# Patient Record
Sex: MALE | Race: WHITE | NOT HISPANIC OR LATINO | Employment: FULL TIME | URBAN - METROPOLITAN AREA
[De-identification: names, ages, dates, MRNs, and addresses within clinical notes are randomized per-mention and may not be internally consistent; named-entity substitution may affect disease eponyms.]

---

## 2017-02-16 ENCOUNTER — TELEPHONE (OUTPATIENT)
Dept: MEDICAL GROUP | Age: 47
End: 2017-02-16

## 2017-02-16 ENCOUNTER — HOSPITAL ENCOUNTER (OUTPATIENT)
Dept: RADIOLOGY | Facility: MEDICAL CENTER | Age: 47
End: 2017-02-16
Attending: FAMILY MEDICINE
Payer: COMMERCIAL

## 2017-02-16 ENCOUNTER — OFFICE VISIT (OUTPATIENT)
Dept: MEDICAL GROUP | Age: 47
End: 2017-02-16
Payer: COMMERCIAL

## 2017-02-16 ENCOUNTER — APPOINTMENT (OUTPATIENT)
Dept: MEDICAL GROUP | Age: 47
End: 2017-02-16
Payer: COMMERCIAL

## 2017-02-16 VITALS
WEIGHT: 290.6 LBS | DIASTOLIC BLOOD PRESSURE: 96 MMHG | HEIGHT: 70 IN | SYSTOLIC BLOOD PRESSURE: 148 MMHG | TEMPERATURE: 97.7 F | OXYGEN SATURATION: 93 % | HEART RATE: 116 BPM | BODY MASS INDEX: 41.6 KG/M2

## 2017-02-16 DIAGNOSIS — J41.8 MIXED SIMPLE AND MUCOPURULENT CHRONIC BRONCHITIS (HCC): ICD-10-CM

## 2017-02-16 DIAGNOSIS — J20.8 VIRAL BRONCHITIS: ICD-10-CM

## 2017-02-16 DIAGNOSIS — J06.9 VIRAL UPPER RESPIRATORY TRACT INFECTION: ICD-10-CM

## 2017-02-16 DIAGNOSIS — E11.69 TYPE 2 DIABETES MELLITUS WITH OTHER SPECIFIED COMPLICATION (HCC): Chronic | ICD-10-CM

## 2017-02-16 PROCEDURE — 94010 BREATHING CAPACITY TEST: CPT | Performed by: FAMILY MEDICINE

## 2017-02-16 PROCEDURE — 99215 OFFICE O/P EST HI 40 MIN: CPT | Mod: 25 | Performed by: FAMILY MEDICINE

## 2017-02-16 PROCEDURE — 71020 DX-CHEST-2 VIEWS: CPT

## 2017-02-16 RX ORDER — PREDNISONE 20 MG/1
TABLET ORAL
Qty: 22 TAB | Refills: 0 | Status: SHIPPED | OUTPATIENT
Start: 2017-02-16 | End: 2017-02-28

## 2017-02-16 RX ORDER — IBUPROFEN 600 MG/1
600 TABLET ORAL EVERY 6 HOURS PRN
Qty: 30 TAB | Refills: 0 | Status: SHIPPED | OUTPATIENT
Start: 2017-02-16 | End: 2017-02-28

## 2017-02-16 RX ORDER — DOXYCYCLINE HYCLATE 100 MG
100 TABLET ORAL 2 TIMES DAILY
Qty: 20 TAB | Refills: 0 | Status: ON HOLD | OUTPATIENT
Start: 2017-02-16 | End: 2017-03-02

## 2017-02-16 RX ORDER — ALBUTEROL SULFATE 90 UG/1
2 AEROSOL, METERED RESPIRATORY (INHALATION) EVERY 6 HOURS PRN
Qty: 8.5 G | Refills: 0 | Status: SHIPPED | OUTPATIENT
Start: 2017-02-16 | End: 2017-02-28

## 2017-02-16 RX ORDER — LANCETS 30 GAUGE
EACH MISCELLANEOUS
Qty: 100 EACH | Refills: 3 | Status: SHIPPED | OUTPATIENT
Start: 2017-02-16 | End: 2017-02-28

## 2017-02-16 NOTE — MR AVS SNAPSHOT
"        Arnulfo Cotton   2017 3:00 PM   Office Visit   MRN: [id number]    Department:  [address] [place] [address]   Dept Phone:  [phone]    Description:  Male : 1970   Provider:  Elena Holcomb M.D.           Reason for Visit     Diabetes med refill metformin and glucose monitor    Cough x 2 mo    Generalized Body Aches x 2 mo      Allergies as of 2017     No Known Allergies      You were diagnosed with     Viral upper respiratory tract infection   [290568]       Viral bronchitis   [359117]       Elevated BP   [117060]         Vital Signs     Blood Pressure Pulse Temperature Height Weight Body Mass Index    148/96 mmHg 116 36.5 °C (97.7 °F) 1.778 m (5' 10\") 131.815 kg (290 lb 9.6 oz) 41.70 kg/m2    Oxygen Saturation Smoking Status                93% Never Smoker           Basic Information     Date Of Birth Sex Race Ethnicity Preferred Language    1970 Male Unknown Non- English      Problem List              ICD-10-CM Priority Class Noted - Resolved    Dyslipidemia (Chronic) E78.5   2015 - Present    Type 2 diabetes mellitus with other specified complication (CMS-HCC) (Chronic) E11.69   2015 - Present    Morbid obesity with BMI of 40.0-44.9, adult (CMS-HCC) E66.01, Z68.41   2015 - Present    Vitamin D deficiency E55.9   10/14/2015 - Present    Hyperglycemia without ketosis R73.9   2016 - Present    Chest pain R07.9   2016 - Present    Hypertension associated with diabetes (CMS-HCC) E11.59, I10   2016 - Present    Viral upper respiratory tract infection J06.9, B97.89   2017 - Present    Viral bronchitis J20.8   2017 - Present    Elevated BP I10   2017 - Present      Health Maintenance        Date Due Completion Dates    IMM HEP B VACCINE (1 of 3 - Primary Series) 1970 ---    RETINAL SCREENING 10/18/2015 10/18/2014    FASTING LIPID PROFILE 2016    URINE ACR / MICROALBUMIN 2016    DIABETES MONOFILAMENT / LE " EXAM 6/14/2016 6/14/2015    IMM INFLUENZA (1) 9/1/2016 10/14/2015    A1C SCREENING 3/5/2017 9/5/2016, 10/14/2015, 7/2/2015, 6/13/2015, 6/11/2015, 4/7/2014    SERUM CREATININE 9/5/2017 9/5/2016, 6/13/2015, 4/6/2014, 10/10/2013    IMM DTaP/Tdap/Td Vaccine (2 - Td) 10/6/2025 10/6/2015            Current Immunizations     Influenza Vaccine Quad Inj (Preserved) 10/14/2015  2:45 PM    Pneumococcal polysaccharide vaccine (PPSV-23) 10/14/2015  2:45 PM    Tdap Vaccine 10/6/2015    Tuberculin Skin Test 8/17/2015 10:25 AM      Below and/or attached are the medications your provider expects you to take. Review all of your home medications and newly ordered medications with your provider and/or pharmacist. Follow medication instructions as directed by your provider and/or pharmacist. Please keep your medication list with you and share with your provider. Update the information when medications are discontinued, doses are changed, or new medications (including over-the-counter products) are added; and carry medication information at all times in the event of emergency situations     Allergies:  No Known Allergies          Medications  Valid as of: February 16, 2017 -  3:56 PM    Generic Name Brand Name Tablet Size Instructions for use    Albuterol Sulfate (Aero Soln) albuterol 108 (90 BASE) MCG/ACT Inhale 2 Puffs by mouth every 6 hours as needed for Shortness of Breath.        Aspirin (Tablet Delayed Response) aspirin 81 MG Take 1 Tab by mouth every day.        Doxycycline Hyclate (Tab) VIBRAMYCIN 100 MG Take 1 Tab by mouth 2 times a day.        GlipiZIDE (Tab) GLUCOTROL 5 MG Take 1 Tab by mouth 2 times daily, before breakfast and dinner.        Glucose Blood (Strip) BLOOD GLUCOSE TEST STRIPS  To be used with glucometer to test blood glucose at least three times daily        Ibuprofen (Tab) MOTRIN 600 MG Take 1 Tab by mouth every 6 hours as needed.        Lancets (Misc) Lancets  Use with glucometer to test blood sugar at least  three times daily        Lisinopril (Tab) PRINIVIL 10 MG Take 1 Tab by mouth every day.        MetFORMIN HCl (Tab) GLUCOPHAGE 500 MG Take 1 Tab by mouth 2 times a day, with meals.        Multiple Vitamins-Minerals (Tab) THERAGRAN-M  Take 1 Tab by mouth every day.        Phenyleph-Promethazine-Cod (Syrup) PHENERGAN VC CODEINE 5-6.25-10 MG/5ML Take 5 mL by mouth every 8 hours as needed.        PredniSONE (Tab) DELTASONE 20 MG Take 3 tabs po for 2 days then 2 tabs for 2 days then 1 for 2 days        .                 Medicines prescribed today were sent to:     [facility] #[id number] - [place], [place] - [address]    [address] [facility] [place] [address]    Phone: [phone] Fax: [phone]    Open 24 Hours?: No    [facility] #[id number] - [facility] [address] [facility] [address]    Phone: [phone] Fax: [phone]    Open 24 Hours?: No      Medication refill instructions:       If your prescription bottle indicates you have medication refills left, it is not necessary to call your provider’s office. Please contact your pharmacy and they will refill your medication.    If your prescription bottle indicates you do not have any refills left, you may request refills at any time through one of the following ways: The online [employer] system (except Urgent Care), by calling your provider’s office, or by asking your pharmacy to contact your provider’s office with a refill request. Medication refills are processed only during regular business hours and may not be available until the next business day. Your provider may request additional information or to have a follow-up visit with you prior to refilling your medication.   *Please Note: Medication refills are assigned a new Rx number when refilled electronically. Your pharmacy may indicate that no refills were authorized even though a new prescription for the same medication is available at the pharmacy. Please request  the medicine by name with the pharmacy before contacting your provider for a refill.        Your To Do List     Future Labs/Procedures Complete By Expires    DX-CHEST-2 VIEWS  As directed 8/19/2017         [employer] Access Code: [email or web address]  Expires: 3/11/2017  2:32 PM    [employer]  A secure, online tool to manage your health information     [employer]’s [employer]® is a secure, online tool that connects you to your personalized health information from the privacy of your home -- day or night - making it very easy for you to manage your healthcare. Once the activation process is completed, you can even access your medical information using the [employer] james, which is available for free in the Apple James store or Google Play store.     [employer] provides the following levels of access (as shown below):   My Chart Features   [facility] Doctor [facility]  Urgent  Care [facility]  Primary Care  Doctor   Email your healthcare team securely and privately 24/7 X X X    Manage appointments: schedule your next appointment; view details of past/upcoming appointments X      Request prescription refills. X      View recent personal medical records, including lab and immunizations X X X X   View health record, including health history, allergies, medications X X X X   Read reports about your outpatient visits, procedures, consult and ER notes X X X X   See your discharge summary, which is a recap of your hospital and/or ER visit that includes your diagnosis, lab results, and care plan. X X       How to register for [employer]:  1. Go to  [email or web address].  2. Click on the Sign Up Now box, which takes you to the New Member Sign Up page. You will need to provide the following information:  a. Enter your [employer] Access Code exactly as it appears at the top of this page. (You will not need to use this code after you’ve completed the sign-up process. If you do not sign up before the expiration date, you  must request a new code.)   b. Enter your date of birth.   c. Enter your home email address.   d. Click Submit, and follow the next screen’s instructions.  3. Create a [employer] ID. This will be your [employer] login ID and cannot be changed, so think of one that is secure and easy to remember.  4. Create a [employer] password. You can change your password at any time.  5. Enter your Password Reset Question and Answer. This can be used at a later time if you forget your password.   6. Enter your e-mail address. This allows you to receive e-mail notifications when new information is available in [employer].  7. Click Sign Up. You can now view your health information.    For assistance activating your [employer] account, call [phone]

## 2017-02-16 NOTE — Clinical Note
[employer]  Nelida Bearden PA-C  [address]  [place] [address]  Fax: [phone]   Authorization for Release/Disclosure of   Protected Health Information   Name: ARNULFO COTTON : 1970 SSN: [id number]   Address: [address]  [place] [address] Phone:    [phone] (home)    I authorize the entity listed below to release/disclose the PHI below to:   [employer]/Nelida Bearden PA-C and Elena Holcomb M.D.   Provider or Entity Name:  Chauncey [facility]or   Address   City, State, [facility]   Phone:      Fax:     Reason for request: continuity of care   Information to be released:    [  ] LAST COLONOSCOPY,  including any PATH REPORT and follow-up  [  ] LAST FIT/COLOGUARD RESULT [  ] LAST DEXA  [  ] LAST MAMMOGRAM  [  ] LAST PAP  [  ] LAST LABS [ xxx ] RETINA EXAM REPORT  [  ] IMMUNIZATION RECORDS  [] Release all info      [  ] Check here and initial the line next to each item to release ALL health information INCLUDING  _____ Care and treatment for drug and / or alcohol abuse  _____ HIV testing, infection status, or AIDS  _____ Genetic Testing    DATES OF SERVICE OR TIME PERIOD TO BE DISCLOSED: _____________  I understand and acknowledge that:  * This Authorization may be revoked at any time by you in writing, except if your health information has already been used or disclosed.  * Your health information that will be used or disclosed as a result of you signing this authorization could be re-disclosed by the recipient. If this occurs, your re-disclosed health information may no longer be protected by State or Federal laws.  * You may refuse to sign this Authorization. Your refusal will not affect your ability to obtain treatment.  * This Authorization becomes effective upon signing and will  on (date) __________.      If no date is indicated, this Authorization will  one (1) year from the signature date.    Name: Arnulfo Cotton    Signature:   Date:     2017       PLEASE FAX  REQUESTED RECORDS BACK TO: [phone]

## 2017-02-17 NOTE — PROGRESS NOTES
This medical record contains text that has been entered with the assistance of computer voice recognition and dictation software.  Therefore, it may contain unintended errors in text, spelling, punctuation, or grammar    Chief Complaint   Patient presents with   • Diabetes     med refill metformin and glucose monitor   • Cough     x 2 mo   • Generalized Body Aches     x 2 mo       Arnulfo Cotton is a 46 y.o. male here evaluation and management of: Productive cough ×6 weeks, shortness of breath      HPI:     Viral bronchitis  The patient is a 46-year-old male who presents to clinic with a chief complaint of coughing up green sputum for the past 6 weeks. He does not smoke cigarettes, hour he is often exposed to secondhand smoke in the casinos as well as from his brother in law and his sister. He states that often his face looks pale he has a difficult time getting air out of his lungs. People often say that he looks like a ghost, very pale. He has no history of chronic ulcerative pulmonary disease.    Elevated BP  Patient has had elevated blood pressure in the past 2 office visits, however he states he was very anxious and today he is very sick. Denies any chest pain, he does have chronic shortness of breath, chronic cough which is productive, as well as dyspnea on exertion which he's had for many years.    Current medicines (including changes today)  Current Outpatient Prescriptions   Medication Sig Dispense Refill   • albuterol 108 (90 BASE) MCG/ACT Aero Soln inhalation aerosol Inhale 2 Puffs by mouth every 6 hours as needed for Shortness of Breath. 8.5 g 0   • prometh-phenylephrine-codeine (PHENERGAN VC CODEINE) 5-6.25-10 MG/5ML Syrup Take 5 mL by mouth every 8 hours as needed. 280 mL 0   • doxycycline (VIBRAMYCIN) 100 MG Tab Take 1 Tab by mouth 2 times a day. 20 Tab 0   • ibuprofen (MOTRIN) 600 MG Tab Take 1 Tab by mouth every 6 hours as needed. 30 Tab 0   • predniSONE (DELTASONE) 20 MG Tab Take 3 tabs po for 2  days then 2 tabs for 2 days then 1 for 2 days 22 Tab 0   • fluticasone-salmeterol (ADVAIR) 100-50 MCG/DOSE AEROSOL POWDER, BREATH ACTIVATED Inhale 1 Puff by mouth every 12 hours. 1 Inhaler 0   • metformin (GLUCOPHAGE) 500 MG Tab Take 1 Tab by mouth 2 times a day, with meals. 60 Tab 3   • glipiZIDE (GLUCOTROL) 5 MG Tab Take 1 Tab by mouth 2 times daily, before breakfast and dinner. 60 Tab 3   • Glucose Blood (BLOOD GLUCOSE TEST STRIPS) Strip To be used with glucometer to test blood glucose at least three times daily 100 Strip 2   • Lancets Misc Use with glucometer to test blood sugar at least three times daily 100 Each 0   • therapeutic multivitamin-minerals (THERAGRAN-M) Tab Take 1 Tab by mouth every day.     • lisinopril (PRINIVIL) 10 MG Tab Take 1 Tab by mouth every day. (Patient not taking: Reported on 2017) 30 Tab 3   • aspirin EC 81 MG EC tablet Take 1 Tab by mouth every day. (Patient not taking: Reported on 2017) 30 Tab 0     No current facility-administered medications for this visit.     He  has a past medical history of Type II or unspecified type diabetes mellitus without mention of complication, not stated as uncontrolled.  He  has past surgical history that includes other orthopedic surgery.  Social History   Substance Use Topics   • Smoking status: Never Smoker    • Smokeless tobacco: Former User     Types: Chew     Quit date: 2010      Comment: 25 years   • Alcohol Use: No     Social History     Social History Narrative     Family History   Problem Relation Age of Onset   • Diabetes Mother    • Hypertension Mother    • Diabetes Father    • Cancer Paternal Uncle      lymphoma, skin   • Diabetes Maternal Grandmother    • Cancer Maternal Grandfather      stomach   • Diabetes Paternal Grandfather    • Heart Disease Neg Hx    • Stroke Neg Hx      Family Status   Relation Status Death Age   • Mother Alive    • Father     • Sister Alive    • Brother Alive    • Paternal Uncle    "  • Maternal Grandmother     • Maternal Grandfather     • Paternal Grandmother     • Paternal Grandfather           ROS  Please see history of present illness    All other systems reviewed and are negative     Objective:     Blood pressure 148/96, pulse 116, temperature 36.5 °C (97.7 °F), height 1.778 m (5' 10\"), weight 131.815 kg (290 lb 9.6 oz), SpO2 93 %. Body mass index is 41.7 kg/(m^2).  Physical Exam:    Constitutional: Alert, no distress.  Skin: Warm, dry, good turgor, no rashes in visible areas.  Eye: Equal, round and reactive, conjunctiva clear, lids normal.  ENMT: Lips without lesions, good dentition, oropharynx clear.  Neck: Trachea midline, no masses, no thyromegaly. No cervical or supraclavicular lymphadenopathy.  Respiratory: Unlabored respiratory effort, decreased breath sounds in left lower lobe, no wheezing, no rhales  Cardiovascular: Normal S1, S2, no murmur, no edema.  Abdomen: Soft, non-tender, no masses, no hepatosplenomegaly.  Psych: Alert and oriented x3, normal affect and mood.          Assessment and Plan:   The following treatment plan was discussed, again this medical record contains text that has been entered with the assistance of computer voice recognition and dictation software.  Therefore, it may contain unintended errors in text, spelling, punctuation, or grammar        1. Viral bronchitis  Patient has severe symptoms so we will start antibiotic  We will also obtain chest x-ray looking for infiltrate versus hyperinflation  There were diminished breath sounds in the left lower lobe  Upon chart review one year ago he had left lower lobe pneumonia  He will discuss with PCP to  consider CT scan of the chest looking for obstructing lesion.    - albuterol 108 (90 BASE) MCG/ACT Aero Soln inhalation aerosol; Inhale 2 Puffs by mouth every 6 hours as needed for Shortness of Breath.  Dispense: 8.5 g; Refill: 0  - prometh-phenylephrine-codeine (PHENERGAN VC " CODEINE) 5-6.25-10 MG/5ML Syrup; Take 5 mL by mouth every 8 hours as needed.  Dispense: 280 mL; Refill: 0  - doxycycline (VIBRAMYCIN) 100 MG Tab; Take 1 Tab by mouth 2 times a day.  Dispense: 20 Tab; Refill: 0  - ibuprofen (MOTRIN) 600 MG Tab; Take 1 Tab by mouth every 6 hours as needed.  Dispense: 30 Tab; Refill: 0  - DX-CHEST-2 VIEWS; Future  - PULMONARY FUNCTION TESTS Test requested: Complete Pulmonary Function Test  - predniSONE (DELTASONE) 20 MG Tab; Take 3 tabs po for 2 days then 2 tabs for 2 days then 1 for 2 days  Dispense: 22 Tab; Refill: 0  - OH BREATHING CAPACITY TEST    3. Elevated BP  Patient  was given instructions to make a two-week home BP log  Then provide this to our clinic  We will then consider appropriate treatment    4. Mixed simple and mucopurulent chronic bronchitis (CMS-HCC)  Spirometry in clinic revealed severely obstructive pattern  We will begin Advair as well as albuterol when necessary  He needs pulmonary function testing as well  He may need home O2 therapy    - fluticasone-salmeterol (ADVAIR) 100-50 MCG/DOSE AEROSOL POWDER, BREATH ACTIVATED; Inhale 1 Puff by mouth every 12 hours.  Dispense: 1 Inhaler; Refill: 0  - REFERRAL TO PULMONOLOGY        Followup: Return in about 4 weeks (around 3/16/2017), or if symptoms worsen or fail to improve, for Reevaluation.

## 2017-02-17 NOTE — TELEPHONE ENCOUNTER
----- Message from Elena Holcomb M.D. sent at 2/16/2017  6:01 PM PST -----  There was no pneumonia seen on your chest xray.  There was scarring seen in the area of pneumonia from last year.  We will consider doing a CT on this, discuss it with your PCP.      Herbert Holcomb MD  [employer], [address] [address]

## 2017-02-17 NOTE — TELEPHONE ENCOUNTER
Phone Number Called: [phone] (home)       Message: Left message for patient to call regarding results    Left Message for patient to call back: yes

## 2017-02-17 NOTE — ASSESSMENT & PLAN NOTE
The patient is a 46-year-old male who presents to clinic with a chief complaint of coughing up green sputum for the past 6 weeks. He does not smoke cigarettes, hour he is often exposed to secondhand smoke in the casinos as well as from his brother in law and his sister. He states that often his face looks pale he has a difficult time getting air out of his lungs. People often say that he looks like a ghost, very pale. He has no history of chronic ulcerative pulmonary disease.

## 2017-02-17 NOTE — ASSESSMENT & PLAN NOTE
Patient has had elevated blood pressure in the past 2 office visits, however he states he was very anxious and today he is very sick. Denies any chest pain, he does have chronic shortness of breath, chronic cough which is productive, as well as dyspnea on exertion which he's had for many years.

## 2017-02-21 DIAGNOSIS — E11.69 TYPE 2 DIABETES MELLITUS WITH OTHER SPECIFIED COMPLICATION (HCC): Chronic | ICD-10-CM

## 2017-02-21 NOTE — TELEPHONE ENCOUNTER
Pharmacy requesting rx for test strips Abbott Precision xtra test strips sig:  Use qd #100    Was the patient seen in the last year in this department? Yes     Does patient have an active prescription for medications requested? No     Received Request Via: Pharmacy

## 2017-02-22 RX ORDER — LANCETS 30 GAUGE
EACH MISCELLANEOUS
Qty: 100 EACH | Refills: 0 | Status: SHIPPED | OUTPATIENT
Start: 2017-02-22 | End: 2017-02-28

## 2017-02-22 NOTE — TELEPHONE ENCOUNTER
Refill done.    Herbert Holcomb MD  [employer]omat, [facility]  [address] [place] [id number]

## 2017-02-28 ENCOUNTER — APPOINTMENT (OUTPATIENT)
Dept: RADIOLOGY | Facility: MEDICAL CENTER | Age: 47
End: 2017-02-28
Attending: EMERGENCY MEDICINE
Payer: COMMERCIAL

## 2017-02-28 ENCOUNTER — HOSPITAL ENCOUNTER (OUTPATIENT)
Facility: MEDICAL CENTER | Age: 47
End: 2017-03-02
Attending: EMERGENCY MEDICINE | Admitting: INTERNAL MEDICINE
Payer: COMMERCIAL

## 2017-02-28 ENCOUNTER — HOSPITAL ENCOUNTER (OUTPATIENT)
Dept: OTHER | Facility: MEDICAL CENTER | Age: 47
End: 2017-02-28
Attending: FAMILY MEDICINE
Payer: COMMERCIAL

## 2017-02-28 ENCOUNTER — RESOLUTE PROFESSIONAL BILLING HOSPITAL PROF FEE (OUTPATIENT)
Dept: HOSPITALIST | Facility: MEDICAL CENTER | Age: 47
End: 2017-02-28
Payer: COMMERCIAL

## 2017-02-28 DIAGNOSIS — J20.8 VIRAL BRONCHITIS: ICD-10-CM

## 2017-02-28 DIAGNOSIS — R07.9 CHEST PAIN, UNSPECIFIED TYPE: ICD-10-CM

## 2017-02-28 DIAGNOSIS — R73.9 HYPERGLYCEMIA: ICD-10-CM

## 2017-02-28 LAB
ALBUMIN SERPL BCP-MCNC: 4.4 G/DL (ref 3.2–4.9)
ALBUMIN/GLOB SERPL: 1.7 G/DL
ALP SERPL-CCNC: 82 U/L (ref 30–99)
ALT SERPL-CCNC: 32 U/L (ref 2–50)
ANION GAP SERPL CALC-SCNC: 9 MMOL/L (ref 0–11.9)
AST SERPL-CCNC: 20 U/L (ref 12–45)
BASOPHILS # BLD AUTO: 0.7 % (ref 0–1.8)
BASOPHILS # BLD: 0.04 K/UL (ref 0–0.12)
BILIRUB SERPL-MCNC: 0.6 MG/DL (ref 0.1–1.5)
BNP SERPL-MCNC: 16 PG/ML (ref 0–100)
BUN SERPL-MCNC: 13 MG/DL (ref 8–22)
CALCIUM SERPL-MCNC: 9.8 MG/DL (ref 8.5–10.5)
CHLORIDE SERPL-SCNC: 106 MMOL/L (ref 96–112)
CO2 SERPL-SCNC: 24 MMOL/L (ref 20–33)
CREAT SERPL-MCNC: 0.73 MG/DL (ref 0.5–1.4)
DEPRECATED D DIMER PPP IA-ACNC: 568 NG/ML(D-DU)
EKG IMPRESSION: NORMAL
EOSINOPHIL # BLD AUTO: 0.1 K/UL (ref 0–0.51)
EOSINOPHIL NFR BLD: 1.8 % (ref 0–6.9)
ERYTHROCYTE [DISTWIDTH] IN BLOOD BY AUTOMATED COUNT: 40.9 FL (ref 35.9–50)
EST. AVERAGE GLUCOSE BLD GHB EST-MCNC: 263 MG/DL
GFR SERPL CREATININE-BSD FRML MDRD: >60 ML/MIN/1.73 M 2
GLOBULIN SER CALC-MCNC: 2.6 G/DL (ref 1.9–3.5)
GLUCOSE BLD-MCNC: 152 MG/DL (ref 65–99)
GLUCOSE BLD-MCNC: 217 MG/DL (ref 65–99)
GLUCOSE SERPL-MCNC: 237 MG/DL (ref 65–99)
HBA1C MFR BLD: 10.8 % (ref 0–5.6)
HCT VFR BLD AUTO: 49.5 % (ref 42–52)
HGB BLD-MCNC: 17.1 G/DL (ref 14–18)
IMM GRANULOCYTES # BLD AUTO: 0.01 K/UL (ref 0–0.11)
IMM GRANULOCYTES NFR BLD AUTO: 0.2 % (ref 0–0.9)
INR PPP: 0.98 (ref 0.87–1.13)
LIPASE SERPL-CCNC: 8 U/L (ref 11–82)
LV EJECT FRACT  99904: 65
LV EJECT FRACT MOD 2C 99903: 68.79
LV EJECT FRACT MOD 4C 99902: 62.83
LV EJECT FRACT MOD BP 99901: 66.14
LYMPHOCYTES # BLD AUTO: 1.68 K/UL (ref 1–4.8)
LYMPHOCYTES NFR BLD: 29.7 % (ref 22–41)
MCH RBC QN AUTO: 29.4 PG (ref 27–33)
MCHC RBC AUTO-ENTMCNC: 34.5 G/DL (ref 33.7–35.3)
MCV RBC AUTO: 85.1 FL (ref 81.4–97.8)
MONOCYTES # BLD AUTO: 0.33 K/UL (ref 0–0.85)
MONOCYTES NFR BLD AUTO: 5.8 % (ref 0–13.4)
NEUTROPHILS # BLD AUTO: 3.5 K/UL (ref 1.82–7.42)
NEUTROPHILS NFR BLD: 61.8 % (ref 44–72)
NRBC # BLD AUTO: 0 K/UL
NRBC BLD AUTO-RTO: 0 /100 WBC
PLATELET # BLD AUTO: 258 K/UL (ref 164–446)
PMV BLD AUTO: 9.8 FL (ref 9–12.9)
POTASSIUM SERPL-SCNC: 3.8 MMOL/L (ref 3.6–5.5)
PROT SERPL-MCNC: 7 G/DL (ref 6–8.2)
PROTHROMBIN TIME: 13.3 SEC (ref 12–14.6)
RBC # BLD AUTO: 5.82 M/UL (ref 4.7–6.1)
SODIUM SERPL-SCNC: 139 MMOL/L (ref 135–145)
TROPONIN I SERPL-MCNC: <0.01 NG/ML (ref 0–0.04)
WBC # BLD AUTO: 5.7 K/UL (ref 4.8–10.8)

## 2017-02-28 PROCEDURE — 700111 HCHG RX REV CODE 636 W/ 250 OVERRIDE (IP): Performed by: INTERNAL MEDICINE

## 2017-02-28 PROCEDURE — G0378 HOSPITAL OBSERVATION PER HR: HCPCS

## 2017-02-28 PROCEDURE — 99220 PR INITIAL OBSERVATION CARE,LEVL III: CPT | Performed by: INTERNAL MEDICINE

## 2017-02-28 PROCEDURE — 700102 HCHG RX REV CODE 250 W/ 637 OVERRIDE(OP): Performed by: INTERNAL MEDICINE

## 2017-02-28 PROCEDURE — 82962 GLUCOSE BLOOD TEST: CPT | Mod: 91

## 2017-02-28 PROCEDURE — 93306 TTE W/DOPPLER COMPLETE: CPT | Mod: 26 | Performed by: INTERNAL MEDICINE

## 2017-02-28 PROCEDURE — 93010 ELECTROCARDIOGRAM REPORT: CPT | Performed by: INTERNAL MEDICINE

## 2017-02-28 PROCEDURE — 700117 HCHG RX CONTRAST REV CODE 255: Performed by: EMERGENCY MEDICINE

## 2017-02-28 PROCEDURE — A9270 NON-COVERED ITEM OR SERVICE: HCPCS | Performed by: EMERGENCY MEDICINE

## 2017-02-28 PROCEDURE — 700102 HCHG RX REV CODE 250 W/ 637 OVERRIDE(OP): Performed by: EMERGENCY MEDICINE

## 2017-02-28 PROCEDURE — 94760 N-INVAS EAR/PLS OXIMETRY 1: CPT

## 2017-02-28 PROCEDURE — 94726 PLETHYSMOGRAPHY LUNG VOLUMES: CPT | Mod: 26 | Performed by: INTERNAL MEDICINE

## 2017-02-28 PROCEDURE — 83880 ASSAY OF NATRIURETIC PEPTIDE: CPT

## 2017-02-28 PROCEDURE — A9270 NON-COVERED ITEM OR SERVICE: HCPCS | Performed by: INTERNAL MEDICINE

## 2017-02-28 PROCEDURE — 93005 ELECTROCARDIOGRAM TRACING: CPT | Performed by: EMERGENCY MEDICINE

## 2017-02-28 PROCEDURE — 85379 FIBRIN DEGRADATION QUANT: CPT

## 2017-02-28 PROCEDURE — 83690 ASSAY OF LIPASE: CPT

## 2017-02-28 PROCEDURE — 93005 ELECTROCARDIOGRAM TRACING: CPT

## 2017-02-28 PROCEDURE — 99285 EMERGENCY DEPT VISIT HI MDM: CPT

## 2017-02-28 PROCEDURE — 93005 ELECTROCARDIOGRAM TRACING: CPT | Performed by: INTERNAL MEDICINE

## 2017-02-28 PROCEDURE — 71275 CT ANGIOGRAPHY CHEST: CPT

## 2017-02-28 PROCEDURE — 94729 DIFFUSING CAPACITY: CPT

## 2017-02-28 PROCEDURE — A9270 NON-COVERED ITEM OR SERVICE: HCPCS | Performed by: HOSPITALIST

## 2017-02-28 PROCEDURE — 700102 HCHG RX REV CODE 250 W/ 637 OVERRIDE(OP): Performed by: HOSPITALIST

## 2017-02-28 PROCEDURE — 94729 DIFFUSING CAPACITY: CPT | Mod: 26 | Performed by: INTERNAL MEDICINE

## 2017-02-28 PROCEDURE — 84484 ASSAY OF TROPONIN QUANT: CPT | Mod: 91

## 2017-02-28 PROCEDURE — 85025 COMPLETE CBC W/AUTO DIFF WBC: CPT

## 2017-02-28 PROCEDURE — 71010 DX-CHEST-PORTABLE (1 VIEW): CPT

## 2017-02-28 PROCEDURE — 85610 PROTHROMBIN TIME: CPT

## 2017-02-28 PROCEDURE — 80053 COMPREHEN METABOLIC PANEL: CPT

## 2017-02-28 PROCEDURE — 93306 TTE W/DOPPLER COMPLETE: CPT

## 2017-02-28 PROCEDURE — 83036 HEMOGLOBIN GLYCOSYLATED A1C: CPT

## 2017-02-28 PROCEDURE — 94060 EVALUATION OF WHEEZING: CPT | Mod: 26 | Performed by: INTERNAL MEDICINE

## 2017-02-28 PROCEDURE — 94060 EVALUATION OF WHEEZING: CPT

## 2017-02-28 PROCEDURE — 36415 COLL VENOUS BLD VENIPUNCTURE: CPT

## 2017-02-28 PROCEDURE — 94726 PLETHYSMOGRAPHY LUNG VOLUMES: CPT

## 2017-02-28 RX ORDER — SODIUM CHLORIDE 9 MG/ML
INJECTION, SOLUTION INTRAVENOUS CONTINUOUS
Status: DISCONTINUED | OUTPATIENT
Start: 2017-03-01 | End: 2017-03-02 | Stop reason: HOSPADM

## 2017-02-28 RX ORDER — NITROGLYCERIN 0.4 MG/1
0.4 TABLET SUBLINGUAL
Status: DISCONTINUED | OUTPATIENT
Start: 2017-02-28 | End: 2017-02-28

## 2017-02-28 RX ORDER — ASPIRIN 325 MG
325 TABLET ORAL DAILY
Status: DISCONTINUED | OUTPATIENT
Start: 2017-03-01 | End: 2017-03-02

## 2017-02-28 RX ORDER — IBUPROFEN 600 MG/1
600 TABLET ORAL 3 TIMES DAILY PRN
Status: DISCONTINUED | OUTPATIENT
Start: 2017-02-28 | End: 2017-03-02 | Stop reason: HOSPADM

## 2017-02-28 RX ORDER — NITROGLYCERIN 0.4 MG/1
0.4 TABLET SUBLINGUAL
Status: DISCONTINUED | OUTPATIENT
Start: 2017-02-28 | End: 2017-03-02 | Stop reason: HOSPADM

## 2017-02-28 RX ORDER — DOCUSATE SODIUM 100 MG/1
100 CAPSULE, LIQUID FILLED ORAL 2 TIMES DAILY
Status: DISCONTINUED | OUTPATIENT
Start: 2017-02-28 | End: 2017-03-02 | Stop reason: HOSPADM

## 2017-02-28 RX ORDER — BISACODYL 10 MG
10 SUPPOSITORY, RECTAL RECTAL
Status: DISCONTINUED | OUTPATIENT
Start: 2017-02-28 | End: 2017-03-02 | Stop reason: HOSPADM

## 2017-02-28 RX ORDER — ASPIRIN 81 MG/1
324 TABLET, CHEWABLE ORAL ONCE
Status: COMPLETED | OUTPATIENT
Start: 2017-02-28 | End: 2017-02-28

## 2017-02-28 RX ORDER — LISINOPRIL 10 MG/1
10 TABLET ORAL DAILY
Status: DISCONTINUED | OUTPATIENT
Start: 2017-03-01 | End: 2017-03-02

## 2017-02-28 RX ORDER — ONDANSETRON 2 MG/ML
4 INJECTION INTRAMUSCULAR; INTRAVENOUS EVERY 4 HOURS PRN
Status: DISCONTINUED | OUTPATIENT
Start: 2017-02-28 | End: 2017-03-02 | Stop reason: HOSPADM

## 2017-02-28 RX ORDER — ASPIRIN 300 MG/1
300 SUPPOSITORY RECTAL DAILY
Status: DISCONTINUED | OUTPATIENT
Start: 2017-03-01 | End: 2017-03-02

## 2017-02-28 RX ORDER — LACTULOSE 20 G/30ML
30 SOLUTION ORAL
Status: DISCONTINUED | OUTPATIENT
Start: 2017-02-28 | End: 2017-03-02 | Stop reason: HOSPADM

## 2017-02-28 RX ORDER — PROMETHAZINE HYDROCHLORIDE 25 MG/1
12.5-25 SUPPOSITORY RECTAL EVERY 4 HOURS PRN
Status: DISCONTINUED | OUTPATIENT
Start: 2017-02-28 | End: 2017-03-02 | Stop reason: HOSPADM

## 2017-02-28 RX ORDER — ASPIRIN 81 MG/1
324 TABLET, CHEWABLE ORAL DAILY
Status: DISCONTINUED | OUTPATIENT
Start: 2017-03-01 | End: 2017-03-02

## 2017-02-28 RX ORDER — ATORVASTATIN CALCIUM 20 MG/1
20 TABLET, FILM COATED ORAL
Status: DISCONTINUED | OUTPATIENT
Start: 2017-02-28 | End: 2017-03-02 | Stop reason: HOSPADM

## 2017-02-28 RX ORDER — PROMETHAZINE HYDROCHLORIDE 25 MG/1
12.5-25 TABLET ORAL EVERY 4 HOURS PRN
Status: DISCONTINUED | OUTPATIENT
Start: 2017-02-28 | End: 2017-03-02 | Stop reason: HOSPADM

## 2017-02-28 RX ORDER — SODIUM CHLORIDE 9 MG/ML
INJECTION, SOLUTION INTRAVENOUS CONTINUOUS
Status: DISCONTINUED | OUTPATIENT
Start: 2017-02-28 | End: 2017-02-28

## 2017-02-28 RX ORDER — ONDANSETRON 4 MG/1
4 TABLET, ORALLY DISINTEGRATING ORAL EVERY 4 HOURS PRN
Status: DISCONTINUED | OUTPATIENT
Start: 2017-02-28 | End: 2017-03-02 | Stop reason: HOSPADM

## 2017-02-28 RX ORDER — ENEMA 19; 7 G/133ML; G/133ML
1 ENEMA RECTAL
Status: DISCONTINUED | OUTPATIENT
Start: 2017-02-28 | End: 2017-03-02 | Stop reason: HOSPADM

## 2017-02-28 RX ORDER — AMOXICILLIN 250 MG
1 CAPSULE ORAL
Status: DISCONTINUED | OUTPATIENT
Start: 2017-02-28 | End: 2017-03-02 | Stop reason: HOSPADM

## 2017-02-28 RX ORDER — ACETAMINOPHEN 325 MG/1
650 TABLET ORAL EVERY 4 HOURS PRN
Status: DISCONTINUED | OUTPATIENT
Start: 2017-02-28 | End: 2017-03-02 | Stop reason: HOSPADM

## 2017-02-28 RX ORDER — DEXTROSE MONOHYDRATE 25 G/50ML
25 INJECTION, SOLUTION INTRAVENOUS
Status: DISCONTINUED | OUTPATIENT
Start: 2017-02-28 | End: 2017-03-02 | Stop reason: HOSPADM

## 2017-02-28 RX ORDER — AMOXICILLIN 250 MG
1 CAPSULE ORAL NIGHTLY
Status: DISCONTINUED | OUTPATIENT
Start: 2017-02-28 | End: 2017-03-02 | Stop reason: HOSPADM

## 2017-02-28 RX ADMIN — METOPROLOL TARTRATE 25 MG: 25 TABLET, FILM COATED ORAL at 16:28

## 2017-02-28 RX ADMIN — NITROGLYCERIN 1 INCH: 20 OINTMENT TOPICAL at 16:29

## 2017-02-28 RX ADMIN — ACETAMINOPHEN 650 MG: 325 TABLET ORAL at 21:53

## 2017-02-28 RX ADMIN — NITROGLYCERIN 1 INCH: 20 OINTMENT TOPICAL at 21:26

## 2017-02-28 RX ADMIN — METOPROLOL TARTRATE 25 MG: 25 TABLET, FILM COATED ORAL at 21:25

## 2017-02-28 RX ADMIN — ATORVASTATIN CALCIUM 20 MG: 20 TABLET, FILM COATED ORAL at 21:25

## 2017-02-28 RX ADMIN — INSULIN LISPRO 3 UNITS: 100 INJECTION, SOLUTION INTRAVENOUS; SUBCUTANEOUS at 21:32

## 2017-02-28 RX ADMIN — NITROGLYCERIN 0.4 MG: 0.4 TABLET SUBLINGUAL at 13:50

## 2017-02-28 RX ADMIN — ENOXAPARIN SODIUM 120 MG: 150 INJECTION SUBCUTANEOUS at 16:28

## 2017-02-28 RX ADMIN — ENOXAPARIN SODIUM 120 MG: 150 INJECTION SUBCUTANEOUS at 21:26

## 2017-02-28 RX ADMIN — ASPIRIN 324 MG: 81 TABLET, CHEWABLE ORAL at 09:38

## 2017-02-28 RX ADMIN — IOHEXOL 75 ML: 350 INJECTION, SOLUTION INTRAVENOUS at 10:56

## 2017-02-28 ASSESSMENT — PULMONARY FUNCTION TESTS
FEV1/FVC: 84
FEV1/FVC: 87.74
FEV1_PREDICTED: 4.14
FVC: 4.16
FEV1/FVC_PERCENT_PREDICTED: 79
FEV1_PERCENT_CHANGE: 2
FEV1_PERCENT_PREDICTED: 86
FEV1_PERCENT_PREDICTED: 88
FVC: 4.28
FEV1/FVC_PERCENT_CHANGE: -67
FEV1/FVC_PERCENT_PREDICTED: 111
FEV1/FVC_PERCENT_PREDICTED: 106
FVC_PREDICTED: 5.26
FEV1: 3.58
FVC_PERCENT_PREDICTED: 81
FEV1: 3.65
FEV1_PERCENT_CHANGE: -3
FVC_PERCENT_PREDICTED: 79

## 2017-02-28 ASSESSMENT — COPD QUESTIONNAIRES
DO YOU EVER COUGH UP ANY MUCUS OR PHLEGM?: YES, A FEW DAYS A WEEK OR MONTH
HAVE YOU SMOKED AT LEAST 100 CIGARETTES IN YOUR ENTIRE LIFE: NO/DON'T KNOW
DURING THE PAST 4 WEEKS HOW MUCH DID YOU FEEL SHORT OF BREATH: SOME OF THE TIME
COPD SCREENING SCORE: 3

## 2017-02-28 ASSESSMENT — LIFESTYLE VARIABLES
ALCOHOL_USE: NO
EVER_SMOKED: NEVER
EVER_SMOKED: NEVER

## 2017-02-28 ASSESSMENT — PAIN SCALES - GENERAL
PAINLEVEL_OUTOF10: 6
PAINLEVEL_OUTOF10: 1
PAINLEVEL_OUTOF10: 6
PAINLEVEL_OUTOF10: 1
PAINLEVEL_OUTOF10: 1

## 2017-02-28 NOTE — IP AVS SNAPSHOT
3/2/2017          Arnulfo Cotton  [address] [place] [address]    Dear Arnulfo:    [facility] wants to ensure your discharge home is safe and you or your loved ones have had all your questions answered regarding your care after you leave the hospital.    You may receive a telephone call within two days of your discharge.  This call is to make certain you understand your discharge instructions as well as ensure we provided you with the best care possible during your stay with us.     The call will only last approximately 3-5 minutes and will be done by a nurse.    Once again, we want to ensure your discharge home is safe and that you have a clear understanding of any next steps in your care.  If you have any questions or concerns, please do not hesitate to contact us, we are here for you.  Thank you for choosing [facility] for your healthcare needs.    Sincerely,    Sarath Sethi    [facility]

## 2017-02-28 NOTE — CONSULTS
[facility] Consult Note          Chief Complaint:  Chest pain, SOB    Referring Physician: Dr James    HPI:  48 yo male with PMH of HTN, DM2 (on metformin, glipizide), HTN (on lisinopril), presented today to ER with c/o chest pain and SOB.   According the patient, symptoms came at 7:45 AM today in the morning when he was doing PFT. He suddenly felt lightheaded, dizzy, short of breath and pain in the middle and left side of the chest started, 8/10, squeezing, radiating to the left arm, associated with tingling in the hands. It lasted about 4-5 min, but he still has some mid chest discomfort. Chest pain was not related to exertion , breathing movements or change in position.  He is working as a . Per history, he has been having episodes of exertional chest pain in the last year, about 2 times a week. His physical activity tolerance has been worsening in the last 2 weeks. He was evaluated for similar chest pain in Sep 2016 in the hospital, when MPI stress test was done, which was NGT.  He was treated for bronchitis and was taking doxycycline Feb 16-26. He still has residual cough with green sputum. ROS also positive for occasional palpitation.     CT PE in ER did not reveal PE. VSs are stable. Trop NGT. My review of EKG has not showed any concerning changes in T/ST.    ROS  ROS questions were asked. They are NGT except as mentioned in HPI.        Past Medical History:   Past Medical History   Diagnosis Date   • Type II or unspecified type diabetes mellitus without mention of complication, not stated as uncontrolled        Past Surgical History:  Past Surgical History   Procedure Laterality Date   • Other orthopedic surgery       Jaw mass       Current Outpatient Medications:  Home Medications     Reviewed by Neris Courtney PhT (Pharmacy Tech) on 02/28/17 at 1220  Med List Status: Complete    Medication Last Dose Status    aspirin EC 81 MG EC tablet 2/27/2017 Active    doxycycline  "(VIBRAMYCIN) 100 MG Tab 2/26/2017 Active    glipiZIDE (GLUCOTROL) 5 MG Tab 2/27/2017 Active    lisinopril (PRINIVIL) 10 MG Tab 2/27/2017 Active    metformin (GLUCOPHAGE) 500 MG Tab 2/27/2017 Active    therapeutic multivitamin-minerals (THERAGRAN-M) Tab 2/27/2017 Active                Medication Allergy/Sensitivities:  No Known Allergies      Family History: FAther had heart attack at 63; mother has heart disease  Family History   Problem Relation Age of Onset   • Diabetes Mother    • Hypertension Mother    • Diabetes Father    • Cancer Paternal Uncle      lymphoma, skin   • Diabetes Maternal Grandmother    • Cancer Maternal Grandfather      stomach   • Diabetes Paternal Grandfather    • Heart Disease Neg Hx    • Stroke Neg Hx        Social History:  Social History     Social History   • Marital Status:      Spouse Name: N/A   • Number of Children: N/A   • Years of Education: N/A     Occupational History   • Not on file.     Social History Main Topics   • Smoking status: Never Smoker    • Smokeless tobacco: Former User     Types: Chew     Quit date: 01/01/2010      Comment: 25 years   • Alcohol Use: No   • Drug Use: No   • Sexual Activity:     Partners: Female     Other Topics Concern   • Not on file     Social History Narrative       PCP : Dr Holcomb      Physical Exam     Filed Vitals:    02/28/17 1101 02/28/17 1130 02/28/17 1200 02/28/17 1230   BP:       Pulse: 84 74 80 81   Temp:       Resp: 18 18 18 13   Height:       Weight:       SpO2: 93% 95% 97% 95%     Body mass index is 41.44 kg/(m^2).  /89 mmHg  Pulse 81  Temp(Src) 36.6 °C (97.8 °F)  Resp 13  Ht 1.778 m (5' 10\")  Wt 131 kg (288 lb 12.8 oz)  BMI 41.44 kg/m2  SpO2 95%  O2 therapy: Pulse Oximetry: 95 %    Physical Exam  General: Mild mid chest pain 3/10   HEENT: MÓNICA EOMI; no JVD  CVS: S12S rrr  RS: CTA BL  Abd: soft NT ND  Extr: no c, e, c        Data Review       Old Records Request:   Completed  Current Records review and summary: " Completed    Lab Data Review:  Recent Results (from the past 24 hour(s))   EKG (NOW)    Collection Time: 17  8:31 AM   Result Value Ref Range    Report       [facility]    Test Date:  2017  Pt Name:    CHRISTIAN KAM               Department: ER  MRN:        [id number]                      Room:  Gender:     M                            Technician: [id number]  :        1970                   Requested By:ER TRIAGE PROTOCOL  Order #:    [id number]                    Reading MD:    Measurements  Intervals                                Axis  Rate:       87                           P:          -1  SC:         144                          QRS:        28  QRSD:       72                           T:          27  QT:         360  QTc:        433    Interpretive Statements  SINUS RHYTHM  Compared to ECG 2016 06:09:36  T-wave abnormality no longer present     CBC WITH DIFFERENTIAL    Collection Time: 17  9:22 AM   Result Value Ref Range    WBC 5.7 4.8 - 10.8 K/uL    RBC 5.82 4.70 - 6.10 M/uL    Hemoglobin 17.1 14.0 - 18.0 g/dL    Hematocrit 49.5 42.0 - 52.0 %    MCV 85.1 81.4 - 97.8 fL    MCH 29.4 27.0 - 33.0 pg    MCHC 34.5 33.7 - 35.3 g/dL    RDW 40.9 35.9 - 50.0 fL    Platelet Count 258 164 - 446 K/uL    MPV 9.8 9.0 - 12.9 fL    Neutrophils-Polys 61.80 44.00 - 72.00 %    Lymphocytes 29.70 22.00 - 41.00 %    Monocytes 5.80 0.00 - 13.40 %    Eosinophils 1.80 0.00 - 6.90 %    Basophils 0.70 0.00 - 1.80 %    Immature Granulocytes 0.20 0.00 - 0.90 %    Nucleated RBC 0.00 /100 WBC    Neutrophils (Absolute) 3.50 1.82 - 7.42 K/uL    Lymphs (Absolute) 1.68 1.00 - 4.80 K/uL    Monos (Absolute) 0.33 0.00 - 0.85 K/uL    Eos (Absolute) 0.10 0.00 - 0.51 K/uL    Baso (Absolute) 0.04 0.00 - 0.12 K/uL    Immature Granulocytes (abs) 0.01 0.00 - 0.11 K/uL    NRBC (Absolute) 0.00 K/uL   COMP METABOLIC PANEL    Collection Time: 17  9:22 AM   Result Value Ref Range    Sodium  139 135 - 145 mmol/L    Potassium 3.8 3.6 - 5.5 mmol/L    Chloride 106 96 - 112 mmol/L    Co2 24 20 - 33 mmol/L    Anion Gap 9.0 0.0 - 11.9    Glucose 237 (H) 65 - 99 mg/dL    Bun 13 8 - 22 mg/dL    Creatinine 0.73 0.50 - 1.40 mg/dL    Calcium 9.8 8.5 - 10.5 mg/dL    AST(SGOT) 20 12 - 45 U/L    ALT(SGPT) 32 2 - 50 U/L    Alkaline Phosphatase 82 30 - 99 U/L    Total Bilirubin 0.6 0.1 - 1.5 mg/dL    Albumin 4.4 3.2 - 4.9 g/dL    Total Protein 7.0 6.0 - 8.2 g/dL    Globulin 2.6 1.9 - 3.5 g/dL    A-G Ratio 1.7 g/dL   DELTA TROPONIN    Collection Time: 02/28/17  9:22 AM   Result Value Ref Range    Troponin I <0.01 0.00 - 0.04 ng/mL   BTYPE NATRIURETIC PEPTIDE    Collection Time: 02/28/17  9:22 AM   Result Value Ref Range    B Natriuretic Peptide 16 0 - 100 pg/mL   D-DIMER (only helpful in low pre-test probability wells critieria. Do not order if patient ruled out by PERC criteria. See Weblinks at top of Labs section)    Collection Time: 02/28/17  9:22 AM   Result Value Ref Range    D-Dimer Screen 568 (H) <250 ng/mL(D-DU)   LIPASE    Collection Time: 02/28/17  9:22 AM   Result Value Ref Range    Lipase 8 (L) 11 - 82 U/L   ESTIMATED GFR    Collection Time: 02/28/17  9:22 AM   Result Value Ref Range    GFR If African American >60 >60 mL/min/1.73 m 2    GFR If Non African American >60 >60 mL/min/1.73 m 2   DELTA TROPONIN    Collection Time: 02/28/17 11:34 AM   Result Value Ref Range    Troponin I <0.01 0.00 - 0.04 ng/mL       Imaging/Procedures Review:    ndependant Imaging Review: Completed  CT-CTA CHEST PULMONARY ARTERY W/ RECONS   Final Result         1. No CT evidence of pulmonary embolism.      2. Unchanged linear atelectasis/scarring.      3. Incompletely evaluated extrahepatic biliary ductal dilatation, grossly similar.         DX-CHEST-PORTABLE (1 VIEW)   Final Result         1. No acute cardiopulmonary abnormalities are identified.               EKG:   EKG Independant Review: Completed  QTc:433, HR: 87, Normal Sinus  Rhythm, ST elevation less than 0.5 mm in III and ST depression  In I, AVL less than 0.5 mm  No changes in it since 9/2016    (x) Records reviewed and summarized in current documentation             Assessment/Plan     46 yo male with PMH of HTN, DM2, with chronic typical  exertional chest pain, pressented after episode of chest pain, developped during PFT     1. Unstable angina   Episode of chest pain developed on PFT , which could be related to inhalation of b-agonists  Patient describes hisotry of typical anginal pain on exertion 2 times a week, worsening of physical activity tolerance in the last 2 weeks   His has RFs of CAD, including, gender, DM, HTN, obesity  He had NGT MPI in 9/2016  Plan:  Admit to telemetry for observation  Repeat troponin and EKG  Cont ASA, lisinopril  Start on BB  Start on heparin IV or lovenox sq weight based  NG SL as needed and morphine IV prn for chest pain  Will schedule for angiography    2. DM2 poorly controlled  Last A1C 12.8 in 9/16; will repeat  Hold metformin and glipizide for possible contrast studies    3. HTN  -stable; cont lisinopril        Quality Measures  Core Measures

## 2017-02-28 NOTE — PROGRESS NOTES
Called Echo room to try and get Pt in today, prior to cath. Trop and INR rescheduled now that patient is on floor. EKG called and notified Pt has arrived, as well. Reviewed prior EKG and labs. Will address with attending if able to get Echo results prior to end of shift.

## 2017-02-28 NOTE — IP AVS SNAPSHOT
" <p align=\"LEFT\"><IMG SRC=\"//[email or web address]\" alt=\"Image\" WIDTH=\"50%\" HEIGHT=\"200\" BORDER=\"\"></p>                   Name:Arnulfo Cotton  Medical Record Number:[id number]  CSN: [id number]    YOB: 1970   Age: 47 y.o.  Sex: male  HT:1.778 m (5' 10\") WT: 130.5 kg (287 lb 11.2 oz)          Admit Date: 2/28/2017     Discharge Date:   Today's Date: 3/2/2017  Attending Doctor:  Jesus Alberto Blandon M.D.                  Allergies:  Review of patient's allergies indicates no known allergies.          Your appointments     Mar 20, 2017  4:10 PM   Established Patient with Nelida Bearden PA-C   [address] ([address] [address] [phone]           You will be receiving a confirmation call a few days before your appointment from our automated call confirmation system.            May 04, 2017  8:40 AM   New Patient Pulmonary with Ismael Nevarez M.D.   [facility] (--)    [address] [facility] [address]  [facility] [address] [phone]              Follow-up Information     1. Follow up with Nelida Bearden PA-C. Schedule an appointment as soon as possible for a visit in 1 week.    Specialty:  Family Medicine    Contact information    [address] [place] [address]   [address] [place] [address] [phone]           Medication List      Take these Medications        Instructions    aspirin 81 MG EC tablet    Take 1 Tab by mouth every day.   Dose:  81 mg       glipiZIDE 5 MG Tabs   Commonly known as:  GLUCOTROL    Take 1 Tab by mouth 2 times daily, before breakfast and dinner.   Dose:  5 mg       ibuprofen 600 MG Tabs   Commonly known as:  MOTRIN    Take 1 Tab by mouth 3 times a day as needed for Moderate Pain, Fever or Inflammation.   Dose:  600 mg       lisinopril 10 MG Tabs   Commonly known as:  PRINIVIL    Take 1 Tab by mouth every day.   Dose:  10 mg       metformin 500 MG Tabs   Commonly known as:  GLUCOPHAGE    Take 1 Tab by mouth 2 times a day, with meals.   "   Dose:  500 mg       therapeutic multivitamin-minerals Tabs    Take 1 Tab by mouth every day.   Dose:  1 Tab

## 2017-02-28 NOTE — ED NOTES
Med rec completed per pt at bedside  Allergies reviewed - MARGE  Pt finished a 10 day course of doxycycline on 2/26/17

## 2017-02-28 NOTE — DISCHARGE PLANNING
Care Transition Team Assessment    Information Source  Orientation : Oriented x 4  Information Given By: Patient  Who is responsible for making decisions for patient? : Patient    Readmission Evaluation  Is this a readmission?: No    Elopement Risk  Legal Hold: No  Elopement Risk: Not at Risk for Elopement    Interdisciplinary Discharge Planning  Does Admitting Nurse Feel This Could be a Complex Discharge?: No  Primary Care Physician: May  Lives with - Patient's Self Care Capacity: Spouse  Support Systems: [employer], Family Member(s)  Housing / Facility: [address]  Do You Take your Prescribed Medications Regularly: Yes  Able to Return to Previous ADL's: Yes  Mobility Issues: No  Prior Services: None  Patient Expects to be Discharged to::  (home)  Assistance Needed: No    Discharge Preparedness  What is your plan after discharge?: Uncertain - pending medical team collaboration  What are your discharge supports?: Spouse  Prior Functional Level: Ambulatory  Difficulity with ADLs: None  Difficulity with IADLs: None    Functional Assesment  Prior Functional Level: Ambulatory    Finances  Financial Barriers to Discharge: No  Prescription Coverage: Yes    Vision / Hearing Impairment  Vision Impairment : No  Hearing Impairment : No    Values / Beliefs / Concerns  Values / Beliefs Concerns : No    Advance Directive  Advance Directive?: None  Advance Directive offered?: AD Booklet refused    Domestic Abuse  Have you ever been the victim of abuse or violence?: No  Physical Abuse or Sexual Abuse: No  Verbal Abuse or Emotional Abuse: No  Possible Abuse Reported to:: Not Applicable    Psychological Assessment  History of Substance Abuse: None  History of Psychiatric Problems: No  Non-compliant with Treatment: No  Newly Diagnosed Illness: No    Discharge Risks or Barriers  Discharge risks or barriers?: No    Anticipated Discharge Information  Anticipated discharge disposition: Home  Discharge Address:  facesheet  Discharge Contact Phone Number: facesheet

## 2017-02-28 NOTE — ED NOTES
.  Chief Complaint   Patient presents with   • Shortness of Breath   • Chest Pain     left cp onset this am     Ambulated to triage, pt was having pulmonary testing done this morning when he developed sob. Has chronic respirator problems.   ekg done on arrival.

## 2017-02-28 NOTE — ED PROVIDER NOTES
"ED Provider Note    CHIEF COMPLAINT  Chief Complaint   Patient presents with   • Shortness of Breath   • Chest Pain     left cp onset this am       HPI  Arnulfo Cotton is a 47 y.o. male who presents complaining of chest pain, left chest radiating to the left arm.  He has associated dizziness described as lightheadedness, and shortness of breath.  No pleurisy.  Patient had similar episode of chest pain 5 months ago, admitted with normal stress test at that time.  Cardiac risk factors including family history, his father had heart attack.  He has high cholesterol, high blood pressure, and diabetes.  No prior cardiac catheterization.  Pain is \"much better\", not completely resolved however approximate 1 hour after onset.  He states his last episode of chest pain only lasted 10 minutes.  No new leg swelling.  No diaphoresis.  He denies trauma.  Patient was getting some type of pulmonary testing during the time of onset of his chest pain.  Please note, family history below is described as negative for heart disease, he states his father had heart disease.    REVIEW OF SYSTEMS    Constitutional: Dizzy, No fever  Respiratory: Shortness of breath  Cardiac: Chest pain  Gastrointestinal: No abdominal pain  Musculoskeletal: No acute neck or back pain  Neurologic: Pain reach to the left arm.  No headache  Endocrine: Diabetes     All other systems are negative.       PAST MEDICAL HISTORY  Past Medical History   Diagnosis Date   • Type II or unspecified type diabetes mellitus without mention of complication, not stated as uncontrolled        FAMILY HISTORY  Family History   Problem Relation Age of Onset   • Diabetes Mother    • Hypertension Mother    • Diabetes Father    • Cancer Paternal Uncle      lymphoma, skin   • Diabetes Maternal Grandmother    • Cancer Maternal Grandfather      stomach   • Diabetes Paternal Grandfather    • Heart Disease Neg Hx    • Stroke Neg Hx        SOCIAL HISTORY  Social History     Social History   • " "Marital Status:      Spouse Name: N/A   • Number of Children: N/A   • Years of Education: N/A     Social History Main Topics   • Smoking status: Never Smoker    • Smokeless tobacco: Former User     Types: Chew     Quit date: 01/01/2010      Comment: 25 years   • Alcohol Use: No   • Drug Use: No   • Sexual Activity:     Partners: Female     Other Topics Concern   • None     Social History Narrative       SURGICAL HISTORY  Past Surgical History   Procedure Laterality Date   • Other orthopedic surgery       Jaw mass       CURRENT MEDICATIONS  Home Medications     Reviewed by Mary Beth iGlliland (Pharmacy Tech) on 02/28/17 at 1220  Med List Status: Complete    Medication Last Dose Status    aspirin EC 81 MG EC tablet 2/27/2017 Active    doxycycline (VIBRAMYCIN) 100 MG Tab 2/26/2017 Active    glipiZIDE (GLUCOTROL) 5 MG Tab 2/27/2017 Active    lisinopril (PRINIVIL) 10 MG Tab 2/27/2017 Active    metformin (GLUCOPHAGE) 500 MG Tab 2/27/2017 Active    therapeutic multivitamin-minerals (THERAGRAN-M) Tab 2/27/2017 Active                ALLERGIES  No Known Allergies    PHYSICAL EXAM  VITAL SIGNS: /74 mmHg  Pulse 81  Temp(Src) 36.6 °C (97.8 °F)  Resp 16  Ht 1.778 m (5' 10\")  Wt 126.5 kg (278 lb 14.1 oz)  BMI 40.02 kg/m2  SpO2 99%  Constitutional:  Non-toxic appearance.   HENT: No facial swelling  Eyes: Anicteric, no conjunctivitis.     Cardiovascular: Normal heart rate, Normal rhythm, no murmur  Pulmonary:  No wheezing, No rales.  Moderate air movement bilateral  Gastrointestinal: Soft, No tenderness, No masses  Skin: Warm, Dry, No cyanosis.  No peripheral edema  Neurologic: Speech is clear, follows commands, facial expression is symmetrical.  Strength intact  Psychiatric: Mildly anxious, otherwise Affect normal,  Mood normal.   Musculoskeletal: No chest wall tenderness    EKG/labs  Results for orders placed or performed during the hospital encounter of 02/28/17   CBC WITH DIFFERENTIAL   Result Value Ref " Range    WBC 5.7 4.8 - 10.8 K/uL    RBC 5.82 4.70 - 6.10 M/uL    Hemoglobin 17.1 14.0 - 18.0 g/dL    Hematocrit 49.5 42.0 - 52.0 %    MCV 85.1 81.4 - 97.8 fL    MCH 29.4 27.0 - 33.0 pg    MCHC 34.5 33.7 - 35.3 g/dL    RDW 40.9 35.9 - 50.0 fL    Platelet Count 258 164 - 446 K/uL    MPV 9.8 9.0 - 12.9 fL    Neutrophils-Polys 61.80 44.00 - 72.00 %    Lymphocytes 29.70 22.00 - 41.00 %    Monocytes 5.80 0.00 - 13.40 %    Eosinophils 1.80 0.00 - 6.90 %    Basophils 0.70 0.00 - 1.80 %    Immature Granulocytes 0.20 0.00 - 0.90 %    Nucleated RBC 0.00 /100 WBC    Neutrophils (Absolute) 3.50 1.82 - 7.42 K/uL    Lymphs (Absolute) 1.68 1.00 - 4.80 K/uL    Monos (Absolute) 0.33 0.00 - 0.85 K/uL    Eos (Absolute) 0.10 0.00 - 0.51 K/uL    Baso (Absolute) 0.04 0.00 - 0.12 K/uL    Immature Granulocytes (abs) 0.01 0.00 - 0.11 K/uL    NRBC (Absolute) 0.00 K/uL   COMP METABOLIC PANEL   Result Value Ref Range    Sodium 139 135 - 145 mmol/L    Potassium 3.8 3.6 - 5.5 mmol/L    Chloride 106 96 - 112 mmol/L    Co2 24 20 - 33 mmol/L    Anion Gap 9.0 0.0 - 11.9    Glucose 237 (H) 65 - 99 mg/dL    Bun 13 8 - 22 mg/dL    Creatinine 0.73 0.50 - 1.40 mg/dL    Calcium 9.8 8.5 - 10.5 mg/dL    AST(SGOT) 20 12 - 45 U/L    ALT(SGPT) 32 2 - 50 U/L    Alkaline Phosphatase 82 30 - 99 U/L    Total Bilirubin 0.6 0.1 - 1.5 mg/dL    Albumin 4.4 3.2 - 4.9 g/dL    Total Protein 7.0 6.0 - 8.2 g/dL    Globulin 2.6 1.9 - 3.5 g/dL    A-G Ratio 1.7 g/dL   DELTA TROPONIN   Result Value Ref Range    Troponin I <0.01 0.00 - 0.04 ng/mL   DELTA TROPONIN   Result Value Ref Range    Troponin I <0.01 0.00 - 0.04 ng/mL   BTYPE NATRIURETIC PEPTIDE   Result Value Ref Range    B Natriuretic Peptide 16 0 - 100 pg/mL   D-DIMER (only helpful in low pre-test probability wells critieria. Do not order if patient ruled out by PERC criteria. See Weblinks at top of Labs section)   Result Value Ref Range    D-Dimer Screen 568 (H) <250 ng/mL(D-DU)   LIPASE   Result Value Ref Range     Lipase 8 (L) 11 - 82 U/L   ESTIMATED GFR   Result Value Ref Range    GFR If African American >60 >60 mL/min/1.73 m 2    GFR If Non African American >60 >60 mL/min/1.73 m 2   Hemoglobin A1c   Result Value Ref Range    Glycohemoglobin 10.8 (H) 0.0 - 5.6 %    Est Avg Glucose 263 mg/dL   Troponin - Every four hours after STAT order X 2   Result Value Ref Range    Troponin I <0.01 0.00 - 0.04 ng/mL   Prothrombin Time (INR)   Result Value Ref Range    PT 13.3 12.0 - 14.6 sec    INR 0.98 0.87 - 1.13   Basic Metabolic Panel (BMP)   Result Value Ref Range    Sodium 137 135 - 145 mmol/L    Potassium 3.8 3.6 - 5.5 mmol/L    Chloride 105 96 - 112 mmol/L    Co2 24 20 - 33 mmol/L    Glucose 263 (H) 65 - 99 mg/dL    Bun 16 8 - 22 mg/dL    Creatinine 0.81 0.50 - 1.40 mg/dL    Calcium 9.6 8.5 - 10.5 mg/dL    Anion Gap 8.0 0.0 - 11.9   CBC with Differential   Result Value Ref Range    WBC 5.5 4.8 - 10.8 K/uL    RBC 5.50 4.70 - 6.10 M/uL    Hemoglobin 16.3 14.0 - 18.0 g/dL    Hematocrit 47.3 42.0 - 52.0 %    MCV 86.0 81.4 - 97.8 fL    MCH 29.6 27.0 - 33.0 pg    MCHC 34.5 33.7 - 35.3 g/dL    RDW 42.8 35.9 - 50.0 fL    Platelet Count 257 164 - 446 K/uL    MPV 9.5 9.0 - 12.9 fL    Neutrophils-Polys 52.30 44.00 - 72.00 %    Lymphocytes 38.90 22.00 - 41.00 %    Monocytes 6.50 0.00 - 13.40 %    Eosinophils 1.80 0.00 - 6.90 %    Basophils 0.50 0.00 - 1.80 %    Immature Granulocytes 0.00 0.00 - 0.90 %    Nucleated RBC 0.00 /100 WBC    Neutrophils (Absolute) 2.88 1.82 - 7.42 K/uL    Lymphs (Absolute) 2.15 1.00 - 4.80 K/uL    Monos (Absolute) 0.36 0.00 - 0.85 K/uL    Eos (Absolute) 0.10 0.00 - 0.51 K/uL    Baso (Absolute) 0.03 0.00 - 0.12 K/uL    Immature Granulocytes (abs) 0.00 0.00 - 0.11 K/uL    NRBC (Absolute) 0.00 K/uL   Lipid Profile (Lipid Panel) Fasting   Result Value Ref Range    Cholesterol,Tot 156 100 - 199 mg/dL    Triglycerides 288 (H) 0 - 149 mg/dL    HDL 31 (A) >=40 mg/dL    LDL 67 <100 mg/dL   Magnesium   Result Value Ref Range     Magnesium 1.9 1.5 - 2.5 mg/dL   ESTIMATED GFR   Result Value Ref Range    GFR If African American >60 >60 mL/min/1.73 m 2    GFR If Non African American >60 >60 mL/min/1.73 m 2   ACCU-CHEK GLUCOSE   Result Value Ref Range    Glucose - Accu-Ck 152 (H) 65 - 99 mg/dL   ACCU-CHEK GLUCOSE   Result Value Ref Range    Glucose - Accu-Ck 217 (H) 65 - 99 mg/dL   ACCU-CHEK GLUCOSE   Result Value Ref Range    Glucose - Accu-Ck 204 (H) 65 - 99 mg/dL   EKG (NOW)   Result Value Ref Range    Report       [facility]    Test Date:  2017  Pt Name:    CHRISTIAN KAM               Department: ER  MRN:        [id number]                      Room:       23  Gender:     M                            Technician: [id number]  :        1970                   Requested By:ER TRIAGE PROTOCOL  Order #:    [id number]                    Reading MD: HAILE CASTILLO MD    Measurements  Intervals                                Axis  Rate:       87                           P:          -1  VT:         144                          QRS:        28  QRSD:       72                           T:          27  QT:         360  QTc:        433    Interpretive Statements  SINUS RHYTHM  Compared to ECG 2016 06:09:36  T-wave abnormality no longer present  minimal ST segment depression unchanged from previous  Electronically Signed On 3-1-2017 7:47:12 PST by HAILE CASTILLO MD     EKG (NOW)   Result Value Ref Range    Report       [facility]    Test Date:  2017  Pt Name:    CHRISTIAN KAM               Department: ER  MRN:        [id number]                      Room:       23  Gender:     M                            Technician: SRS  :        1970                   Requested By:HAILE CASTILLO  Order #:    [id number]                    Reading MD: Serg Lennon MD    Measurements  Intervals                                Axis  Rate:       73                           P:           15  MN:         144                          QRS:        46  QRSD:       76                           T:          40  QT:         376  QTc:        415    Interpretive Statements  SINUS RHYTHM  Compared to ECG 2017 08:31:30  No significant changes    Electronically Signed On 2017 16:33:49 PST by Serg Lennon MD     EKG in 4 Hours   Result Value Ref Range    Report       [facility]    Test Date:  2017  Pt Name:    CHRISTIAN ROSADOER               Department: 171  MRN:        [id number]                      Room:       [facility]  Gender:     M                            Technician:   :        1970                   Requested By:THAI HOPPER  Order #:    [id number]                    Reading MD: Stu Rolon MD    Measurements  Intervals                                Axis  Rate:       83                           P:          19  MN:         168                          QRS:        28  QRSD:       66                           T:          44  QT:         356  QTc:        419    Interpretive Statements  SINUS RHYTHM  BASELINE WANDER IN LEAD(S) V4,V5  Compared to ECG 2017 16:07:11  No significant changes    Electronically Signed On 3-1-2017 4:55:22 PST by Stu Rolon MD     EKG in 4 Hours   Result Value Ref Range    Report       [facility]    Test Date:  2017  Pt Name:    CHRISTIAN [facility]               Department: 171  MRN:        [id number]                      Room:       23  Gender:     M                            Technician:   :        1970                   Requested By:THAI HOPPER  Order #:    [id number]                    Reading MD: Stu Rolon MD    Measurements  Intervals                                Axis  Rate:       76                           P:          48  MN:         160                          QRS:        52  QRSD:       74                           T:          45  QT:         380  QTc:        428    Interpretive  Statements  SINUS RHYTHM  BASELINE WANDER IN LEAD(S) II,III,aVF,V1  Compared to ECG 02/28/2017 20:10:16  No significant changes    Electronically Signed On 3-1-2017 4:55:52 PST by Stu Rolon MD     ECHOCARDIOGRAM-COMP W/ CONT   Result Value Ref Range    Eject.Frac. MOD BP 66.14     Eject.Frac. MOD 4C 62.83     Eject.Frac. MOD 2C 68.79     Left Ventrical Ejection Fraction 65          RADIOLOGY/PROCEDURES  ECHOCARDIOGRAM-COMP W/ CONT   Final Result      CT-CTA CHEST PULMONARY ARTERY W/ RECONS   Final Result         1. No CT evidence of pulmonary embolism.      2. Unchanged linear atelectasis/scarring.      3. Incompletely evaluated extrahepatic biliary ductal dilatation, grossly similar.         DX-CHEST-PORTABLE (1 VIEW)   Final Result         1. No acute cardiopulmonary abnormalities are identified.            COURSE & MEDICAL DECISION MAKING  Pertinent Labs & Imaging studies reviewed. (See chart for details)  Patient with positive d-dimer, therefore CT scan was obtained of his chest which ruled out pulmonary embolism.  EKG was unchanged, troponin negative.  I discussed the case with on-call cardiology, Dr. Pacheco, given the patient's multiple risk factors and worsening chest pain, he is admitted for ongoing evaluation, possible angiogram.  Patient treated with aspirin.  Now currently chest pain-free.    FINAL IMPRESSION     1. Chest pain, unspecified type    2. Hyperglycemia                    Electronically signed by: Robby James, 3/1/2017 7:47 AM

## 2017-02-28 NOTE — IP AVS SNAPSHOT
" Home Care Instructions                                                                                                                  Name:Arnulfo Cotton  Medical Record Number:[id number]  CSN: [id number]    YOB: 1970   Age: 47 y.o.  Sex: male  HT:1.778 m (5' 10\") WT: 130.5 kg (287 lb 11.2 oz)          Admit Date: 2/28/2017     Discharge Date:   Today's Date: 3/2/2017  Attending Doctor:  Jesus Alberto Blandon M.D.                  Allergies:  Review of patient's allergies indicates no known allergies.            Discharge Instructions       Discharge Instructions    Discharged to home by car with friend. Discharged via wheelchair, hospital escort: Yes.  Special equipment needed: Not Applicable    Be sure to schedule a follow-up appointment with your primary care doctor or any specialists as instructed.     Discharge Plan:   Diet Plan: Discussed  Activity Level: Discussed  Confirmed Follow up Appointment: Appointment Scheduled  Confirmed Symptoms Management: Discussed  Medication Reconciliation Updated: Yes  Influenza Vaccine Indication: Indicated: 9 to 64 years of age  Influenza Vaccine Given - only chart on this line when given: Influenza Vaccine Given (See MAR)    I understand that a diet low in cholesterol, fat, and sodium is recommended for good health. Unless I have been given specific instructions below for another diet, I accept this instruction as my diet prescription.   Other diet: Heart-Healthy Eating Plan  Many factors influence your heart health, including eating and exercise habits. Heart (coronary) risk increases with abnormal blood fat (lipid) levels. Heart-healthy meal planning includes limiting unhealthy fats, increasing healthy fats, and making other small dietary changes. This includes maintaining a healthy body weight to help keep lipid levels within a normal range.  WHAT IS MY PLAN?   Your health care provider recommends that you:  · Get no more than _________% of the total calories in your " "daily diet from fat.  · Limit your intake of saturated fat to less than _________% of your total calories each day.  · Limit the amount of cholesterol in your diet to less than _________ mg per day.  WHAT TYPES OF FAT SHOULD I CHOOSE?  · Choose healthy fats more often. Choose monounsaturated and polyunsaturated fats, such as olive oil and canola oil, flaxseeds, walnuts, almonds, and seeds.  · Eat more omega-3 fats. Good choices include salmon, mackerel, sardines, tuna, flaxseed oil, and ground flaxseeds. Aim to eat fish at least two times each week.  · Limit saturated fats. Saturated fats are primarily found in animal products, such as meats, butter, and cream. Plant sources of saturated fats include palm oil, palm kernel oil, and coconut oil.  · Avoid foods with partially hydrogenated oils in them. These contain trans fats. Examples of foods that contain trans fats are stick margarine, some tub margarines, cookies, crackers, and other baked goods.  WHAT GENERAL GUIDELINES DO I NEED TO FOLLOW?  · Check food labels carefully to identify foods with trans fats or high amounts of saturated fat.  · Fill one half of your plate with vegetables and green salads. Eat 4-5 servings of vegetables per day. A serving of vegetables equals 1 cup of raw leafy vegetables, ½ cup of raw or cooked cut-up vegetables, or ½ cup of vegetable juice.  · Fill one fourth of your plate with whole grains. Look for the word \"whole\" as the first word in the ingredient list.  · Fill one fourth of your plate with lean protein foods.  · Eat 4-5 servings of fruit per day. A serving of fruit equals one medium whole fruit, ¼ cup of dried fruit, ½ cup of fresh, frozen, or canned fruit, or ½ cup of 100% fruit juice.  · Eat more foods that contain soluble fiber. Examples of foods that contain this type of fiber are apples, broccoli, carrots, beans, peas, and barley. Aim to get 20-30 g of fiber per day.  · Eat more home-cooked food and less restaurant, " buffet, and fast food.  · Limit or avoid alcohol.  · Limit foods that are high in starch and sugar.  · Avoid fried foods.  · Cook foods by using methods other than frying. Baking, boiling, grilling, and broiling are all great options. Other fat-reducing suggestions include:  ¨ Removing the skin from poultry.  ¨ Removing all visible fats from meats.  ¨ Skimming the fat off of stews, soups, and gravies before serving them.  ¨ Steaming vegetables in water or broth.  · Lose weight if you are overweight. Losing just 5-10% of your initial body weight can help your overall health and prevent diseases such as diabetes and heart disease.  · Increase your consumption of nuts, legumes, and seeds to 4-5 servings per week. One serving of dried beans or legumes equals ½ cup after being cooked, one serving of nuts equals 1½ ounces, and one serving of seeds equals ½ ounce or 1 tablespoon.  · You may need to monitor your salt (sodium) intake, especially if you have high blood pressure. Talk with your health care provider or dietitian to get more information about reducing sodium.  WHAT FOODS CAN I EAT?  Grains  Breads, including Libyan, white, basil, wheat, raisin, rye, oatmeal, and Italian. Tortillas that are neither fried nor made with lard or trans fat. Low-fat rolls, including hotdog and hamburger buns and English muffins. Biscuits. Muffins. Waffles. Pancakes. Light popcorn. Whole-grain cereals. Flatbread. [place] toast. Pretzels. Breadsticks. Rusks. Low-fat snacks and crackers, including oyster, saltine, matzo, yadira, animal, and rye. Rice and pasta, including brown rice and those that are made with whole wheat.  Vegetables  All vegetables.  Fruits  All fruits, but limit coconut.  Meats and Other Protein Sources  Lean, well-trimmed beef, veal, pork, and lamb. Chicken and turkey without skin. All fish and shellfish. Wild duck, rabbit, pheasant, and venison. Egg whites or low-cholesterol egg substitutes. Dried beans, peas, lentils,  and tofu. Seeds and most nuts.  Dairy  Low-fat or nonfat cheeses, including ricotta, string, and mozzarella. Skim or 1% milk that is liquid, powdered, or evaporated. Buttermilk that is made with low-fat milk. Nonfat or low-fat yogurt.  Beverages  Mineral water. Diet carbonated beverages.  Sweets and Desserts  Sherbets and fruit ices. Honey, jam, marmalade, jelly, and syrups. Meringues and gelatins. Pure sugar candy, such as hard candy, jelly beans, gumdrops, mints, marshmallows, and small amounts of dark chocolate. Cruz food cake.  Eat all sweets and desserts in moderation.  Fats and Oils  Nonhydrogenated (trans-free) margarines. Vegetable oils, including soybean, sesame, sunflower, olive, peanut, safflower, corn, canola, and cottonseed. Salad dressings or mayonnaise that are made with a vegetable oil. Limit added fats and oils that you use for cooking, baking, salads, and as spreads.  Other  Cocoa powder. Coffee and tea. All seasonings and condiments.  The items listed above may not be a complete list of recommended foods or beverages. Contact your dietitian for more options.  WHAT FOODS ARE NOT RECOMMENDED?  Grains  Breads that are made with saturated or trans fats, oils, or whole milk. Croissants. Butter rolls. Cheese breads. Sweet rolls. Donuts. Buttered popcorn. Chow mein noodles. High-fat crackers, such as cheese or butter crackers.  Meats and Other Protein Sources  Fatty meats, such as hotdogs, short ribs, sausage, spareribs, phillips, ribeye roast or steak, and mutton. High-fat deli meats, such as salami and bologna. Caviar. Domestic duck and goose. Organ meats, such as kidney, liver, sweetbreads, brains, gizzard, chitterlings, and heart.  Dairy  Cream, sour cream, cream cheese, and creamed cottage cheese. Whole milk cheeses, including blue (aleksandr), [place] Andrea, Brie, [place], American, Havarti, Swiss, cheddar, Camembert, and Patricia.  Whole or 2% milk that is liquid, evaporated, or condensed. Whole  buttermilk. Cream sauce or high-fat cheese sauce. Yogurt that is made from whole milk.  Beverages  Regular sodas and drinks with added sugar.  Sweets and Desserts  Frosting. Pudding. Cookies. Cakes other than adrián food cake. Candy that has milk chocolate or white chocolate, hydrogenated fat, butter, coconut, or unknown ingredients. Buttered syrups. Full-fat ice cream or ice cream drinks.  Fats and Oils  Gravy that has suet, meat fat, or shortening. Cocoa butter, hydrogenated oils, palm oil, coconut oil, palm kernel oil. These can often be found in baked products, candy, fried foods, nondairy creamers, and whipped toppings. Solid fats and shortenings, including phillips fat, salt pork, lard, and butter. Nondairy cream substitutes, such as coffee creamers and sour cream substitutes. Salad dressings that are made of unknown oils, cheese, or sour cream.  The items listed above may not be a complete list of foods and beverages to avoid. Contact your dietitian for more information.     This information is not intended to replace advice given to you by your health care provider. Make sure you discuss any questions you have with your health care provider.     Document Released: 09/26/2009 Document Revised: 01/08/2016 Document Reviewed: 06/11/2015  [employer] Interactive Patient Education ©2016 [employer]      Special Instructions: Chest Pain, Nonspecific  It is often hard to give a specific diagnosis for the cause of chest pain. There is always a chance that your pain could be related to something serious, like a heart attack or a blood clot in the lungs. You need to follow up with your caregiver for further evaluation. More lab tests or other studies such as X-rays, electrocardiography, stress testing, or cardiac imaging may be needed to find the cause of your pain.  Most of the time, nonspecific chest pain improves within 2 to 3 days with rest and mild pain medicine. For the next few days, avoid physical exertion or  activities that bring on pain. Do not smoke. Avoid drinking alcohol. Call your caregiver for routine follow-up as advised.   SEEK IMMEDIATE MEDICAL CARE IF:  · You develop increased chest pain or pain that radiates to the arm, neck, jaw, back, or abdomen.   · You develop shortness of breath, increased coughing, or you start coughing up blood.   · You have severe back or abdominal pain, nausea, or vomiting.   · You develop severe weakness, fainting, fever, or chills.   Document Released: 12/18/2006 Document Revised: 03/11/2013 Document Reviewed: 06/06/2008  ExitCare® Patient Information ©2013 [employer].    · Is patient discharged on Warfarin / Coumadin?   No     · Is patient Post Blood Transfusion?  No    Depression / Suicide Risk    As you are discharged from this [facility] facility, it is important to learn how to keep safe from harming yourself.    Recognize the warning signs:  · Abrupt changes in personality, positive or negative- including increase in energy   · Giving away possessions  · Change in eating patterns- significant weight changes-  positive or negative  · Change in sleeping patterns- unable to sleep or sleeping all the time   · Unwillingness or inability to communicate  · Depression  · Unusual sadness, discouragement and loneliness  · Talk of wanting to die  · Neglect of personal appearance   · Rebelliousness- reckless behavior  · Withdrawal from people/activities they love  · Confusion- inability to concentrate     If you or a loved one observes any of these behaviors or has concerns about self-harm, here's what you can do:  · Talk about it- your feelings and reasons for harming yourself  · Remove any means that you might use to hurt yourself (examples: pills, rope, extension cords, firearm)  · Get professional help from the community (Mental Health, Substance Abuse, psychological counseling)  · Do not be alone:Call your Safe Contact- someone whom you trust who will be there for you.  · Call  your local CRISIS HOTLINE 836-6686 or 516-651-6596  · Call your local Children's Mobile Crisis Response Team [place] (352) 884-8231 or www.Stitch Fix  · Call the toll free National Suicide Prevention Hotlines   · National Suicide Prevention Lifeline 332-616-FDDX (1451)  · National Hope Line Network 800-SUICIDE (796-2538)        Your appointments     Mar 20, 2017  4:10 PM   [facility]    [address]  [place] [address] [phone]           You will be receiving a confirmation call a few days before your appointment from our automated call confirmation system.            May 04, 2017  8:40 AM   [facility]    [address]  [address]  [place] [address] [phone]              Follow-up Information     1. Follow up with Nelida Bearden PA-C. Schedule an appointment as soon as possible for a visit in 1 week.    Specialty:  Family Medicine    Contact information    [address]  [place] [address] [phone]           Discharge Medication Instructions:    Below are the medications your physician expects you to take upon discharge:    Review all your home medications and newly ordered medications with your doctor and/or pharmacist. Follow medication instructions as directed by your doctor and/or pharmacist.    Please keep your medication list with you and share with your physician.               Medication List      START taking these medications        Instructions    ibuprofen 600 MG Tabs   Commonly known as:  MOTRIN    Take 1 Tab by mouth 3 times a day as needed for Moderate Pain, Fever or Inflammation.   Dose:  600 mg         CONTINUE taking these medications        Instructions    aspirin 81 MG EC tablet    Take 1 Tab by mouth every day.   Dose:  81 mg       glipiZIDE 5 MG Tabs   Commonly known as:  GLUCOTROL    Take 1 Tab by mouth  2 times daily, before breakfast and dinner.   Dose:  5 mg       lisinopril 10 MG Tabs   Last time this was given:  10 mg on 3/1/2017  9:04 AM   Commonly known as:  PRINIVIL    Take 1 Tab by mouth every day.   Dose:  10 mg       metformin 500 MG Tabs   Commonly known as:  GLUCOPHAGE    Take 1 Tab by mouth 2 times a day, with meals.   Dose:  500 mg       therapeutic multivitamin-minerals Tabs    Take 1 Tab by mouth every day.   Dose:  1 Tab         STOP taking these medications     doxycycline 100 MG Tabs   Commonly known as:  VIBRAMYCIN               Instructions           Diet / Nutrition:    Follow any diet instructions given to you by your doctor or the dietician, including how much salt (sodium) you are allowed each day.    If you are overweight, talk to your doctor about a weight reduction plan.    Activity:    Remain physically active following your doctor's instructions about exercise and activity.    Rest often.     Any time you become even a little tired or short of breath, SIT DOWN and rest.    Worsening Symptoms:    Report any of the following signs and symptoms to the doctor's office immediately:    *Pain of jaw, arm, or neck  *Chest pain not relieved by medication                               *Dizziness or loss of consciousness  *Difficulty breathing even when at rest   *More tired than usual                                       *Bleeding drainage or swelling of surgical site  *Swelling of feet, ankles, legs or stomach                 *Fever (>100ºF)  *Pink or blood tinged sputum  *Weight gain (3lbs/day or 5lbs /week)           *Shock from internal defibrillator (if applicable)  *Palpitations or irregular heartbeats                *Cool and/or numb extremities    Stroke Awareness    Common Risk Factors for Stroke include:    Age  Atrial Fibrillation  Carotid Artery Stenosis  Diabetes Mellitus  Excessive alcohol consumption  High blood pressure  Overweight   Physical inactivity  Smoking    Warning signs  and symptoms of a stroke include:    *Sudden numbness or weakness of the face, arm or leg (especially on one side of the body).  *Sudden confusion, trouble speaking or understanding.  *Sudden trouble seeing in one or both eyes.  *Sudden trouble walking, dizziness, loss of balance or coordination.Sudden severe headache with no known cause.    It is very important to get treatment quickly when a stroke occurs. If you experience any of the above warning signs, call 911 immediately.                   Disclaimer         Quit Smoking / Tobacco Use:    I understand the use of any tobacco products increases my chance of suffering from future heart disease or stroke and could cause other illnesses which may shorten my life. Quitting the use of tobacco products is the single most important thing I can do to improve my health. For further information on smoking / tobacco cessation call a Toll Free Quit Line at 1-375.107.8342 (*National Cancer [place]) or 1-350.264.7573 (American Lung Association) or you can access the web based program at www.[employer]DEGA [employer]oyal[employer].org.    [place] Tobacco Users Help Line:  (518) 173-4375       Toll Free: 1-265.259.3269  Quit Tobacco Program [facility] (089)407-9838    Crisis Hotline:    [place] Crisis Hotline:  4-958-YIGZYXX or 1-514.631.4175    [place] Crisis Hotline:    1-867.515.1308 or 658-514-3560    Discharge Survey:   Thank you for choosing [facility]. We hope we did everything we could to make your hospital stay a pleasant one. You may be receiving a phone survey and we would appreciate your time and participation in answering the questions. Your input is very valuable to us in our efforts to improve our service to our patients and their families.        My signature on this form indicates that:    1. I have reviewed and understand the above information.  2. My questions regarding this information have been answered to my satisfaction.  3. I have formulated a plan with my  discharge nurse to obtain my prescribed medications for home.                  Disclaimer         __________________________________                     __________       ________                       Patient Signature                                                 Date                    Time

## 2017-02-28 NOTE — IP AVS SNAPSHOT
[employer] Access Code: Activation code not generated  Current [employer] Status: Active    K[employer]art  A secure, online tool to manage your health information     [employer]’s [employer]® is a secure, online tool that connects you to your personalized health information from the privacy of your home -- day or night - making it very easy for you to manage your healthcare. Once the activation process is completed, you can even access your medical information using the [employer] james, which is available for free in the Apple James store or Google Play store.     [employer] provides the following levels of access (as shown below):   My Chart Features   [facility] Primary Care Doctor [facility]  Specialists [facility]  Urgent  Care Non-[facility]  Primary Care  Doctor   Email your healthcare team securely and privately 24/7 X X X X   Manage appointments: schedule your next appointment; view details of past/upcoming appointments X      Request prescription refills. X      View recent personal medical records, including lab and immunizations X X X X   View health record, including health history, allergies, medications X X X X   Read reports about your outpatient visits, procedures, consult and ER notes X X X X   See your discharge summary, which is a recap of your hospital and/or ER visit that includes your diagnosis, lab results, and care plan. X X       How to register for [employer]:  1. Go to  [email or web address].  2. Click on the Sign Up Now box, which takes you to the New Member Sign Up page. You will need to provide the following information:  a. Enter your [employer] Access Code exactly as it appears at the top of this page. (You will not need to use this code after you’ve completed the sign-up process. If you do not sign up before the expiration date, you must request a new code.)   b. Enter your date of birth.   c. Enter your home email address.   d. Click Submit, and follow the next screen’s instructions.  3. Create a [employer] ID. This will  be your [employer] login ID and cannot be changed, so think of one that is secure and easy to remember.  4. Create a [employer] password. You can change your password at any time.  5. Enter your Password Reset Question and Answer. This can be used at a later time if you forget your password.   6. Enter your e-mail address. This allows you to receive e-mail notifications when new information is available in [employer].  7. Click Sign Up. You can now view your health information.    For assistance activating your [employer] account, call [phone]

## 2017-03-01 LAB
ANION GAP SERPL CALC-SCNC: 7 MMOL/L (ref 0–11.9)
ANION GAP SERPL CALC-SCNC: 8 MMOL/L (ref 0–11.9)
BASOPHILS # BLD AUTO: 0.5 % (ref 0–1.8)
BASOPHILS # BLD: 0.03 K/UL (ref 0–0.12)
BUN SERPL-MCNC: 15 MG/DL (ref 8–22)
BUN SERPL-MCNC: 16 MG/DL (ref 8–22)
CALCIUM SERPL-MCNC: 9.4 MG/DL (ref 8.5–10.5)
CALCIUM SERPL-MCNC: 9.6 MG/DL (ref 8.5–10.5)
CHLORIDE SERPL-SCNC: 104 MMOL/L (ref 96–112)
CHLORIDE SERPL-SCNC: 105 MMOL/L (ref 96–112)
CHOLEST SERPL-MCNC: 156 MG/DL (ref 100–199)
CO2 SERPL-SCNC: 24 MMOL/L (ref 20–33)
CO2 SERPL-SCNC: 26 MMOL/L (ref 20–33)
CREAT SERPL-MCNC: 0.81 MG/DL (ref 0.5–1.4)
CREAT SERPL-MCNC: 0.81 MG/DL (ref 0.5–1.4)
EKG IMPRESSION: NORMAL
EOSINOPHIL # BLD AUTO: 0.1 K/UL (ref 0–0.51)
EOSINOPHIL NFR BLD: 1.8 % (ref 0–6.9)
ERYTHROCYTE [DISTWIDTH] IN BLOOD BY AUTOMATED COUNT: 42 FL (ref 35.9–50)
ERYTHROCYTE [DISTWIDTH] IN BLOOD BY AUTOMATED COUNT: 42.8 FL (ref 35.9–50)
GFR SERPL CREATININE-BSD FRML MDRD: >60 ML/MIN/1.73 M 2
GFR SERPL CREATININE-BSD FRML MDRD: >60 ML/MIN/1.73 M 2
GLUCOSE BLD-MCNC: 108 MG/DL (ref 65–99)
GLUCOSE BLD-MCNC: 187 MG/DL (ref 65–99)
GLUCOSE BLD-MCNC: 204 MG/DL (ref 65–99)
GLUCOSE BLD-MCNC: 245 MG/DL (ref 65–99)
GLUCOSE SERPL-MCNC: 207 MG/DL (ref 65–99)
GLUCOSE SERPL-MCNC: 263 MG/DL (ref 65–99)
HCT VFR BLD AUTO: 47.3 % (ref 42–52)
HCT VFR BLD AUTO: 48 % (ref 42–52)
HDLC SERPL-MCNC: 31 MG/DL
HGB BLD-MCNC: 16.2 G/DL (ref 14–18)
HGB BLD-MCNC: 16.3 G/DL (ref 14–18)
IMM GRANULOCYTES # BLD AUTO: 0 K/UL (ref 0–0.11)
IMM GRANULOCYTES NFR BLD AUTO: 0 % (ref 0–0.9)
LDLC SERPL CALC-MCNC: 67 MG/DL
LYMPHOCYTES # BLD AUTO: 2.15 K/UL (ref 1–4.8)
LYMPHOCYTES NFR BLD: 38.9 % (ref 22–41)
MAGNESIUM SERPL-MCNC: 1.9 MG/DL (ref 1.5–2.5)
MCH RBC QN AUTO: 29 PG (ref 27–33)
MCH RBC QN AUTO: 29.6 PG (ref 27–33)
MCHC RBC AUTO-ENTMCNC: 33.8 G/DL (ref 33.7–35.3)
MCHC RBC AUTO-ENTMCNC: 34.5 G/DL (ref 33.7–35.3)
MCV RBC AUTO: 86 FL (ref 81.4–97.8)
MCV RBC AUTO: 86 FL (ref 81.4–97.8)
MONOCYTES # BLD AUTO: 0.36 K/UL (ref 0–0.85)
MONOCYTES NFR BLD AUTO: 6.5 % (ref 0–13.4)
NEUTROPHILS # BLD AUTO: 2.88 K/UL (ref 1.82–7.42)
NEUTROPHILS NFR BLD: 52.3 % (ref 44–72)
NRBC # BLD AUTO: 0 K/UL
NRBC BLD AUTO-RTO: 0 /100 WBC
PLATELET # BLD AUTO: 248 K/UL (ref 164–446)
PLATELET # BLD AUTO: 257 K/UL (ref 164–446)
PMV BLD AUTO: 9.5 FL (ref 9–12.9)
PMV BLD AUTO: 9.8 FL (ref 9–12.9)
POTASSIUM SERPL-SCNC: 3.8 MMOL/L (ref 3.6–5.5)
POTASSIUM SERPL-SCNC: 4.1 MMOL/L (ref 3.6–5.5)
RBC # BLD AUTO: 5.5 M/UL (ref 4.7–6.1)
RBC # BLD AUTO: 5.58 M/UL (ref 4.7–6.1)
SODIUM SERPL-SCNC: 137 MMOL/L (ref 135–145)
SODIUM SERPL-SCNC: 137 MMOL/L (ref 135–145)
TRIGL SERPL-MCNC: 288 MG/DL (ref 0–149)
TROPONIN I SERPL-MCNC: <0.01 NG/ML (ref 0–0.04)
WBC # BLD AUTO: 5.5 K/UL (ref 4.8–10.8)
WBC # BLD AUTO: 6 K/UL (ref 4.8–10.8)

## 2017-03-01 PROCEDURE — 307093 HCHG TR BAND RADIAL

## 2017-03-01 PROCEDURE — 700111 HCHG RX REV CODE 636 W/ 250 OVERRIDE (IP)

## 2017-03-01 PROCEDURE — A9270 NON-COVERED ITEM OR SERVICE: HCPCS | Performed by: INTERNAL MEDICINE

## 2017-03-01 PROCEDURE — 85025 COMPLETE CBC W/AUTO DIFF WBC: CPT

## 2017-03-01 PROCEDURE — 36415 COLL VENOUS BLD VENIPUNCTURE: CPT

## 2017-03-01 PROCEDURE — 700102 HCHG RX REV CODE 250 W/ 637 OVERRIDE(OP): Performed by: INTERNAL MEDICINE

## 2017-03-01 PROCEDURE — 80048 BASIC METABOLIC PNL TOTAL CA: CPT

## 2017-03-01 PROCEDURE — 700112 HCHG RX REV CODE 229: Performed by: INTERNAL MEDICINE

## 2017-03-01 PROCEDURE — 93005 ELECTROCARDIOGRAM TRACING: CPT | Performed by: INTERNAL MEDICINE

## 2017-03-01 PROCEDURE — 80061 LIPID PANEL: CPT

## 2017-03-01 PROCEDURE — 83735 ASSAY OF MAGNESIUM: CPT

## 2017-03-01 PROCEDURE — 82962 GLUCOSE BLOOD TEST: CPT | Mod: 91

## 2017-03-01 PROCEDURE — 99225 PR SUBSEQUENT OBSERVATION CARE,LEVEL II: CPT | Performed by: INTERNAL MEDICINE

## 2017-03-01 PROCEDURE — C1894 INTRO/SHEATH, NON-LASER: HCPCS

## 2017-03-01 PROCEDURE — 700101 HCHG RX REV CODE 250

## 2017-03-01 PROCEDURE — C1769 GUIDE WIRE: HCPCS

## 2017-03-01 PROCEDURE — 700105 HCHG RX REV CODE 258: Performed by: INTERNAL MEDICINE

## 2017-03-01 PROCEDURE — 93010 ELECTROCARDIOGRAM REPORT: CPT | Performed by: INTERNAL MEDICINE

## 2017-03-01 PROCEDURE — 360979 HCHG DIAGNOSTIC CATH

## 2017-03-01 PROCEDURE — 85027 COMPLETE CBC AUTOMATED: CPT

## 2017-03-01 PROCEDURE — 700117 HCHG RX CONTRAST REV CODE 255: Performed by: INTERNAL MEDICINE

## 2017-03-01 PROCEDURE — 99152 MOD SED SAME PHYS/QHP 5/>YRS: CPT

## 2017-03-01 PROCEDURE — 303418 HCHG 4FR ULTIMATE CATHETER

## 2017-03-01 PROCEDURE — G0378 HOSPITAL OBSERVATION PER HR: HCPCS

## 2017-03-01 PROCEDURE — 93458 L HRT ARTERY/VENTRICLE ANGIO: CPT

## 2017-03-01 PROCEDURE — 84484 ASSAY OF TROPONIN QUANT: CPT

## 2017-03-01 RX ORDER — MIDAZOLAM HYDROCHLORIDE 1 MG/ML
INJECTION INTRAMUSCULAR; INTRAVENOUS
Status: COMPLETED
Start: 2017-03-01 | End: 2017-03-01

## 2017-03-01 RX ORDER — HEPARIN SODIUM,PORCINE 1000/ML
VIAL (ML) INJECTION
Status: COMPLETED
Start: 2017-03-01 | End: 2017-03-01

## 2017-03-01 RX ORDER — LIDOCAINE HYDROCHLORIDE 20 MG/ML
INJECTION, SOLUTION INFILTRATION; PERINEURAL
Status: COMPLETED
Start: 2017-03-01 | End: 2017-03-01

## 2017-03-01 RX ORDER — VERAPAMIL HYDROCHLORIDE 2.5 MG/ML
INJECTION, SOLUTION INTRAVENOUS
Status: COMPLETED
Start: 2017-03-01 | End: 2017-03-01

## 2017-03-01 RX ADMIN — LIDOCAINE HYDROCHLORIDE: 20 INJECTION, SOLUTION INFILTRATION; PERINEURAL at 12:52

## 2017-03-01 RX ADMIN — METOPROLOL TARTRATE 25 MG: 25 TABLET, FILM COATED ORAL at 09:04

## 2017-03-01 RX ADMIN — NITROGLYCERIN 1 INCH: 20 OINTMENT TOPICAL at 09:04

## 2017-03-01 RX ADMIN — METOPROLOL TARTRATE 25 MG: 25 TABLET, FILM COATED ORAL at 20:58

## 2017-03-01 RX ADMIN — INSULIN LISPRO 3 UNITS: 100 INJECTION, SOLUTION INTRAVENOUS; SUBCUTANEOUS at 06:31

## 2017-03-01 RX ADMIN — NITROGLYCERIN 1 INCH: 20 OINTMENT TOPICAL at 21:00

## 2017-03-01 RX ADMIN — ASPIRIN 325 MG: 325 TABLET, COATED ORAL at 09:04

## 2017-03-01 RX ADMIN — HEPARIN SODIUM: 1000 INJECTION, SOLUTION INTRAVENOUS; SUBCUTANEOUS at 12:52

## 2017-03-01 RX ADMIN — IOHEXOL 80 ML: 350 INJECTION, SOLUTION INTRAVENOUS at 13:07

## 2017-03-01 RX ADMIN — MIDAZOLAM 2 MG: 1 INJECTION INTRAMUSCULAR; INTRAVENOUS at 13:00

## 2017-03-01 RX ADMIN — STANDARDIZED SENNA CONCENTRATE AND DOCUSATE SODIUM 1 TABLET: 8.6; 5 TABLET, FILM COATED ORAL at 20:57

## 2017-03-01 RX ADMIN — VERAPAMIL HYDROCHLORIDE 2.5 MG: 2.5 INJECTION, SOLUTION INTRAVENOUS at 12:52

## 2017-03-01 RX ADMIN — LISINOPRIL 10 MG: 10 TABLET ORAL at 09:04

## 2017-03-01 RX ADMIN — DOCUSATE SODIUM 100 MG: 100 CAPSULE ORAL at 09:04

## 2017-03-01 RX ADMIN — NITROGLYCERIN 10 ML: 20 INJECTION INTRAVENOUS at 12:52

## 2017-03-01 RX ADMIN — NITROGLYCERIN 1 INCH: 20 OINTMENT TOPICAL at 15:15

## 2017-03-01 RX ADMIN — FENTANYL CITRATE 100 MCG: 50 INJECTION, SOLUTION INTRAMUSCULAR; INTRAVENOUS at 13:00

## 2017-03-01 RX ADMIN — SODIUM CHLORIDE: 9 INJECTION, SOLUTION INTRAVENOUS at 07:04

## 2017-03-01 RX ADMIN — DOCUSATE SODIUM 100 MG: 100 CAPSULE ORAL at 20:57

## 2017-03-01 RX ADMIN — ATORVASTATIN CALCIUM 20 MG: 20 TABLET, FILM COATED ORAL at 20:58

## 2017-03-01 RX ADMIN — INSULIN LISPRO 3 UNITS: 100 INJECTION, SOLUTION INTRAVENOUS; SUBCUTANEOUS at 21:06

## 2017-03-01 RX ADMIN — HEPARIN SODIUM 2000 UNITS: 200 INJECTION, SOLUTION INTRAVENOUS at 13:00

## 2017-03-01 ASSESSMENT — PAIN SCALES - GENERAL
PAINLEVEL_OUTOF10: 0
PAINLEVEL_OUTOF10: 1

## 2017-03-01 ASSESSMENT — ENCOUNTER SYMPTOMS
SHORTNESS OF BREATH: 0
FEVER: 0
COUGH: 0
DIZZINESS: 0
MYALGIAS: 0
ABDOMINAL PAIN: 0
BLOOD IN STOOL: 0
PALPITATIONS: 0
CHILLS: 0
HEADACHES: 0

## 2017-03-01 NOTE — DIETARY
NUTRITION SERVICES: BMI - Pt with BMI >40 (=40.02). Weight loss counseling not appropriate in acute care setting. Pt with hx of DM and elevated glucose; HbA1c is 10.8 on 2/28 and FSBS: 152-204 (2/28-3/1) on SSI. Pt currently NPO. Met with pt who reports he has had DM for past three years with no DM diet education and requested we provide him education. Provided written and verbal information on DM diet with resources for follow up. Pt appeared motivated to learn diet education, expect good compliance.      RECOMMEND - Referral to outpatient nutrition services for weight management after D/C. Consider long acting insulin for tight glucose control when medically feasible. RD available PRN.

## 2017-03-01 NOTE — RESPIRATORY CARE
COPD EDUCATION by COPD CLINICAL EDUCATOR  3/1/2017 at 6:36 AM by Jenn Chiang     Patient reviewed by COPD education team. Patient does not qualify for COPD program.

## 2017-03-01 NOTE — H&P
CHIEF COMPLAINT:  Chest pain after PFT study.    HISTORY OF PRESENT ILLNESS:  The patient is a 47-year-old male with a history   of multiple medical problems including type 2 diabetes mellitus, as well as   hyperlipidemia.  He also has unknown reasons for persisting cough.  He was   with his lung doctor getting PFTs this morning to try to determine this and   near the end of the study he started having increasing chest pain over the   left breast.  It then eventually radiated down his arm.  He said it was   intense, crushing like 10/10 and lasted for 5 minutes and then eventually went   back to his left chest, only it is 3/10, he did not take anything there.  He   says he has had chest pain like this before, but never this severe.  He did   have a nuclear medicine cardiac stress test in September 2016, which was   negative.  He says he also noticed that he has had increasing dyspnea on   exertion, which he thought was related to lung issues, however, though about   two months ago he could walk 4 or 5 miles, now if he walks about 1/4 mile he   gets very short of breath.  Denies any associated nausea, vomiting, or   diaphoresis.  He denies any positional changes.  The only thing he took for   the chest pain at the PFT study was two baby aspirins.    REVIEW OF SYSTEMS:  All other systems reviewed and negative.    In the ED, full-dose aspirin.  Cardiology was consulted.    PAST MEDICAL HISTORY:  1.  Type 2 diabetes mellitus.  2.  Hyperlipidemia.  3.  Unknown cough syndrome.    PAST SURGICAL HISTORY:  Jaw mass resection.    SOCIAL HISTORY:  No alcohol, tobacco, or drugs, is a [employer].    FAMILY HISTORY:  Diabetes mellitus, heart attacks, and hypertension.    ALLERGIES:  None.    MEDICATIONS PRIOR TO ADMISSION:  1.  Aspirin 81 mg daily.  2.  Doxycycline 100 mg b.i.d., finished 10-day course.  3.  Glipizide 5 mg tablets b.i.d. before breakfast _____.  4.  Lisinopril 10 mg tablets everyday.  5.  Metformin 500 mg b.i.d.  with meals.  6.  Multivitamin tablet daily.    PHYSICAL EXAMINATION:  VITAL SIGNS:  Temperature is 36.6, heart rate 74, respiratory rate 18, oxygen   saturation 95% on room air, and blood pressure 128/70.  GENERAL:  No acute distress, speaking in full sentences, A and O x4, pleasant,   cooperative.  HEENT:  Normocephalic and atraumatic.  Pupils are equal and reactive to light.    Extraocular muscles intact.  NECK:  Flat JVD.  CARDIOVASCULAR:  Regular rate and rhythm.  No murmurs, rubs, or gallops.    Nondisplaced PMI.  LUNGS:  Clear to auscultation bilaterally.  No use of accessory muscles.  ABDOMEN:  Soft, nontender, and nondistended.  Normoactive bowel sounds.  No   hepatosplenomegaly.  EXTREMITIES:  No clubbing, cyanosis, or edema.  He is warm peripherally 2+   radial pulses equal and symmetric.  He has 1+ pitting edema in the bilateral   pretibial region.  NEUROLOGICAL:  Intact sensation to soft touch, nonfocal.  MUSCULOSKELETAL:  Full range of motion of all extremities, 5/5 muscle strength   throughout.  No chest wall tenderness appreciated.  SKIN:  No rashes, lesions, or excoriations.  PSYCHOLOGICAL:  Appropriate affect, A and O x4.    LABORATORY DATA AND IMAGING:  CBC remarkable for white blood cell count of   5.7, H and H 17.1 and 49.5, and platelet count 258,000.  CMP:  Sodium 138,   potassium 3.8, BUN and creatinine is 13 and 0.73, and glucose is 237.  AST and   ALT 32 and 82, and lipase 8.  Glycohemoglobin 10.8.  Troponin 0.01 x3.  BNP   is 16.  PT/INR 13.3 and 0.98.    IMAGIN.  Echocardiogram pending.  2.  CTA of the chest with contrast.  No CT evidence of pulmonary embolism,   unchanged linear atelectasis scarring, incompletely evaluated extrahepatic   biliary ductal dilatation grossly similar.  3.  Portable chest x-ray.  No acute cardiopulmonary abnormalities identified.  4.  EKG personally reviewed by me, sinus rhythm, heart rate 82.  No evidence   of acute ST segment changes.    ASSESSMENT AND  PLAN:  1.  Chest pain.  2.  Unknown cough syndrome.  3.  Diabetes mellitus type 2, poorly controlled.  4.  Hyperlipidemia.  5.  Obesity.    PLAN:  The patient will be admitted to the telemetry at this time.  He has   chest pain, which is highly concerning for cardiac and possible mild unstable   angina.  Echocardiogram has been ordered and been done, but has not been read.    Additionally, likely go hard with catheterization given his high risk with   his family history, being male, and age as well as diabetes mellitus as well.    We will start metoprolol 25 mg b.i.d.  Continue his outpatient ACE inhibitor.    We will do weight-based Lovenox given his possible unstable angina and do   full dose aspirin as well.  I will also do nitroglycerin patch.    CODE STATUS:  Full code.    DIET:  Cardiac and n.p.o. after midnight.    DVT PROPHYLAXIS:  Weight-based Lovenox subcutaneously.       ____________________________________     MD DACIA CASH / BUDDY    DD:  02/28/2017 18:36:39  DT:  02/28/2017 21:50:56    D#:  [id number]  Job#:  [id number]

## 2017-03-01 NOTE — CARE PLAN
Problem: Safety  Goal: Will remain free from falls  Outcome: PROGRESSING AS EXPECTED  Pt is compliant with fall precautions. No fall on shift.    Problem: Respiratory:  Goal: Respiratory status will improve  Outcome: PROGRESSING AS EXPECTED  Some demand need and low sat at room air indicate 2 L NC maintained.

## 2017-03-01 NOTE — PROGRESS NOTES
PT BACK FROM CARDIAC CATH PROCEDURE. NO INTERVENTION INDICATED. PT HAS A R RADIAL BALOON TOURNIQUET IN PLACE. NO ACTIVE BLEEDING SEEN. PT HAS NO NEEDS AT THIS TIME.

## 2017-03-01 NOTE — PROCEDURES
DATE OF SERVICE:  03/01/2017    PROCEDURE:  Cardiac catheterization.  A.  Left heart catheterization.  B.  Left ventriculography.  C.  Selective coronary angiography.  C.  Right radial artery approach.    PREPROCEDURE DIAGNOSES:  1.  Unstable angina pectoris.  2.  Hypertension.  3.  Diabetes mellitus.  4.  Hyperlipidemia.    POSTPROCEDURE DIAGNOSES:  1.  Left ventricular ejection fraction 70% with normal wall motion.  2.  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  3.  Mild nonobstructive coronary artery disease.     PHYSICIAN:  Serg Lennon MD    REFERRING PHYSICIAN:  Adriane Pacheco MD    COMPLICATIONS:  None.    MEDICATIONS:  1.  Versed 2 mg IV.  2.  Fentanyl 100 mcg IV.  3.  Lidocaine 2% subcutaneous.  4.  Heparin 3000 units IA.  5.  Nitroglycerin 100 mcg IA.  6.  Verapamil 2.5 mg IA.    INDICATIONS:  A 47-year-old male admitted to [facility] on   2/28/2017 with unstable angina pectoris. A myocardial infarction was ruled out.  The patient has a history of hypertension, hyperlipidemia, diabetes   mellitus, and a family history of premature coronary artery disease.  Previous   myocardial perfusion stress test on 9/5/2016 showed an ejection fraction of   70% and normal perfusion with normal wall motion.  Echocardiogram on 2/28/2017   showed ejection fraction of 65% with no other abnormalities.    The patient is referred for a diagnostic cardiac catheterization to rule out significant  obstructive coronary artery disease with a consideration of therapeutic coronary intervention.    DESCRIPTION OF PROCEDURE:  After informed consent was obtained, the patient   was brought to the cardiac catheterization laboratory.    After establishing the presence of adequate collateral circulation to the   right hand with a pressure and oximetry-guided Mario's test, the volar surface   of the right wrist was prepped, draped, and anesthetized in the usual manner.    Using a a modified  Seldinger technique, a 6-Tuvaluan x 10 cm introducer sheath was   inserted in the right radial artery.  Heparin, verapamil, and nitroglycerin   were given via the side port.    Next, a 6-Tuvaluan JL 3.5 left coronary catheter was inserted in the ostium of   the left coronary artery and left coronary angiograms were obtained in various   projections.    Next, a 6-Tuvaluan JR 4.0 right coronary catheter was inserted in the ostium of the   right coronary and right coronary angiograms were obtained in various   projections.    Next, a 6-Tuvaluan pigtail catheterization was inserted in the left ventricle on   fluoroscopic guidance.  Single plane GASTON left ventricular angiogram was   performed.  Pre and post angiogram LVEDP, LV and aortic pressures were   obtained.    At the end of procedure, catheters were removed.  Hemostasis was achieved with   a wrist band device.  Patient tolerated the procedure well.    FINDINGS:  1.  HEMODYNAMICS:  LEFT HEART PRESSURES:  LVEDP of 23, left ventricular systolic pressure of 118,   central aortic pressure systolic 111, diastolic 60.    2.  LEFT VENTRICULOGRAPHY:  Left ventricular chamber size, wall motion and   systolic function are normal.  Calculated ejection fraction is 70%.    3.  SELECTIVE CORONARY ANGIOGRAPHY:  LEFT MAIN ARTERY:  Left main vessel is angiographically normal, bifurcates into a  left anterior descending artery and circumflex artery.  LEFT ANTERIOR DESCENDING ARTERY:  The LAD is a moderately large caliber   vessel, gives rise to a large caliber diagonal branch, a normal complement of   septal branches and then extends around the apex.  The left anterior descending   artery is widely patent with moderate systolic compression of the mid LAD   segment consistent with myocardial bridging, otherwise with no stenotic segments.  CIRCUMFLEX ARTERY:  The circumflex is a large caliber vessel, gives rise to a   bifurcating first marginal branch, a long posterior lateral branch and the    posterior descending artery.  Angiographically, the circumflex artery has mid   vessel mild smooth focal eccentric atheroma with approximately 20%   narrowing.  RIGHT CORONARY ARTERY:  The RCA is nondominant, gives rise to RV branches and   is angiographically normal.    CONCLUSION:  1.  EF 70%.  2.  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  3.  Mild nonobstructive coronary artery disease.       ____________________________________     MD CAMRON PETERSON / BUDDY    DD:  03/01/2017 13:27:24  DT:  03/01/2017 13:56:32    D#:  [id number]  Job#:  [id number]

## 2017-03-01 NOTE — PROGRESS NOTES
Bedside report received, assumed care of patient. Assessment performed. Pt has no complaints of pain. Discussed plan of care with pt.  Call light within reach, pt educated to call for assistance.

## 2017-03-01 NOTE — PROGRESS NOTES
Pt arrived to unit C/O chest pressure, non-radiating, non-relieved. Received nitro SL en route, on transdermal patch here on floor. Pt is normotensive, denies jaw, shoulder, or arm pain, N&V. SOB on exertion, but also has Hx of viral bronchitis on or before 2/16/17. Echo scheduled, EKG and labs not collected, will contact and rectify. Bed low and locked, call bell in reach, alarm on. SR on tele.

## 2017-03-01 NOTE — PROGRESS NOTES
3 ML OF AIR REMOVED FROM RADIAL BALOON. NO BLEEDING ASSESSED.  1600-6 MLS REMOVED FROM RADIAL BALOON. NO BLEEDING ASSESSED. GAUZE AND TRANSPARENT FILM PLACED OVER SITE.

## 2017-03-02 VITALS
WEIGHT: 287.7 LBS | SYSTOLIC BLOOD PRESSURE: 116 MMHG | DIASTOLIC BLOOD PRESSURE: 76 MMHG | TEMPERATURE: 96.9 F | HEART RATE: 80 BPM | RESPIRATION RATE: 18 BRPM | HEIGHT: 70 IN | BODY MASS INDEX: 41.19 KG/M2 | OXYGEN SATURATION: 98 %

## 2017-03-02 LAB
ANION GAP SERPL CALC-SCNC: 11 MMOL/L (ref 0–11.9)
BASOPHILS # BLD AUTO: 0.4 % (ref 0–1.8)
BASOPHILS # BLD: 0.02 K/UL (ref 0–0.12)
BUN SERPL-MCNC: 24 MG/DL (ref 8–22)
CALCIUM SERPL-MCNC: 9.6 MG/DL (ref 8.5–10.5)
CHLORIDE SERPL-SCNC: 104 MMOL/L (ref 96–112)
CO2 SERPL-SCNC: 23 MMOL/L (ref 20–33)
CREAT SERPL-MCNC: 0.84 MG/DL (ref 0.5–1.4)
EOSINOPHIL # BLD AUTO: 0.15 K/UL (ref 0–0.51)
EOSINOPHIL NFR BLD: 2.7 % (ref 0–6.9)
ERYTHROCYTE [DISTWIDTH] IN BLOOD BY AUTOMATED COUNT: 42.5 FL (ref 35.9–50)
GFR SERPL CREATININE-BSD FRML MDRD: >60 ML/MIN/1.73 M 2
GLUCOSE BLD-MCNC: 186 MG/DL (ref 65–99)
GLUCOSE BLD-MCNC: 227 MG/DL (ref 65–99)
GLUCOSE SERPL-MCNC: 187 MG/DL (ref 65–99)
HCT VFR BLD AUTO: 47.6 % (ref 42–52)
HGB BLD-MCNC: 16 G/DL (ref 14–18)
IMM GRANULOCYTES # BLD AUTO: 0.01 K/UL (ref 0–0.11)
IMM GRANULOCYTES NFR BLD AUTO: 0.2 % (ref 0–0.9)
LYMPHOCYTES # BLD AUTO: 1.67 K/UL (ref 1–4.8)
LYMPHOCYTES NFR BLD: 30.1 % (ref 22–41)
MCH RBC QN AUTO: 29.3 PG (ref 27–33)
MCHC RBC AUTO-ENTMCNC: 33.6 G/DL (ref 33.7–35.3)
MCV RBC AUTO: 87.2 FL (ref 81.4–97.8)
MONOCYTES # BLD AUTO: 0.38 K/UL (ref 0–0.85)
MONOCYTES NFR BLD AUTO: 6.8 % (ref 0–13.4)
NEUTROPHILS # BLD AUTO: 3.32 K/UL (ref 1.82–7.42)
NEUTROPHILS NFR BLD: 59.8 % (ref 44–72)
NRBC # BLD AUTO: 0 K/UL
NRBC BLD AUTO-RTO: 0 /100 WBC
PLATELET # BLD AUTO: 246 K/UL (ref 164–446)
PMV BLD AUTO: 10.1 FL (ref 9–12.9)
POTASSIUM SERPL-SCNC: 3.9 MMOL/L (ref 3.6–5.5)
RBC # BLD AUTO: 5.46 M/UL (ref 4.7–6.1)
SODIUM SERPL-SCNC: 138 MMOL/L (ref 135–145)
WBC # BLD AUTO: 5.6 K/UL (ref 4.8–10.8)

## 2017-03-02 PROCEDURE — 700102 HCHG RX REV CODE 250 W/ 637 OVERRIDE(OP): Performed by: INTERNAL MEDICINE

## 2017-03-02 PROCEDURE — A9270 NON-COVERED ITEM OR SERVICE: HCPCS | Performed by: INTERNAL MEDICINE

## 2017-03-02 PROCEDURE — 80048 BASIC METABOLIC PNL TOTAL CA: CPT

## 2017-03-02 PROCEDURE — 700111 HCHG RX REV CODE 636 W/ 250 OVERRIDE (IP): Performed by: INTERNAL MEDICINE

## 2017-03-02 PROCEDURE — 90471 IMMUNIZATION ADMIN: CPT

## 2017-03-02 PROCEDURE — 36415 COLL VENOUS BLD VENIPUNCTURE: CPT

## 2017-03-02 PROCEDURE — 90686 IIV4 VACC NO PRSV 0.5 ML IM: CPT | Performed by: INTERNAL MEDICINE

## 2017-03-02 PROCEDURE — 85025 COMPLETE CBC W/AUTO DIFF WBC: CPT

## 2017-03-02 PROCEDURE — 82962 GLUCOSE BLOOD TEST: CPT

## 2017-03-02 PROCEDURE — G0378 HOSPITAL OBSERVATION PER HR: HCPCS

## 2017-03-02 PROCEDURE — 99217 PR OBSERVATION CARE DISCHARGE: CPT | Performed by: INTERNAL MEDICINE

## 2017-03-02 RX ORDER — SODIUM CHLORIDE 9 MG/ML
1000 INJECTION, SOLUTION INTRAVENOUS ONCE
Status: COMPLETED | OUTPATIENT
Start: 2017-03-02 | End: 2017-03-02

## 2017-03-02 RX ORDER — LISINOPRIL 5 MG/1
5 TABLET ORAL DAILY
Status: DISCONTINUED | OUTPATIENT
Start: 2017-03-02 | End: 2017-03-02 | Stop reason: HOSPADM

## 2017-03-02 RX ORDER — IBUPROFEN 600 MG/1
600 TABLET ORAL 3 TIMES DAILY PRN
Qty: 30 TAB | Refills: 0 | Status: SHIPPED | OUTPATIENT
Start: 2017-03-02 | End: 2018-07-23

## 2017-03-02 RX ADMIN — INSULIN LISPRO 3 UNITS: 100 INJECTION, SOLUTION INTRAVENOUS; SUBCUTANEOUS at 06:21

## 2017-03-02 RX ADMIN — SODIUM CHLORIDE 1000 ML: 9 INJECTION, SOLUTION INTRAVENOUS at 08:36

## 2017-03-02 RX ADMIN — INFLUENZA A VIRUS A/CALIFORNIA/7/2009 X-179A (H1N1) ANTIGEN (FORMALDEHYDE INACTIVATED), INFLUENZA A VIRUS A/HONG KONG/4801/2014 X-263B (H3N2) ANTIGEN (FORMALDEHYDE INACTIVATED), INFLUENZA B VIRUS B/PHUKET/3073/2013 ANTIGEN (FORMALDEHYDE INACTIVATED), AND INFLUENZA B VIRUS B/BRISBANE/60/2008 ANTIGEN (FORMALDEHYDE INACTIVATED) 0.5 ML: 15; 15; 15; 15 INJECTION, SUSPENSION INTRAMUSCULAR at 12:17

## 2017-03-02 RX ADMIN — INSULIN LISPRO 2 UNITS: 100 INJECTION, SOLUTION INTRAVENOUS; SUBCUTANEOUS at 10:23

## 2017-03-02 RX ADMIN — METOPROLOL TARTRATE 12.5 MG: 25 TABLET, FILM COATED ORAL at 08:36

## 2017-03-02 ASSESSMENT — PAIN SCALES - GENERAL
PAINLEVEL_OUTOF10: 0
PAINLEVEL_OUTOF10: 0

## 2017-03-02 ASSESSMENT — ENCOUNTER SYMPTOMS
DIZZINESS: 0
PALPITATIONS: 0

## 2017-03-02 ASSESSMENT — LIFESTYLE VARIABLES: EVER_SMOKED: NEVER

## 2017-03-02 NOTE — PROGRESS NOTES
Cardiology Progress Note               Author: Adriane Sweeneysalpenelope Date & Time created: 3/2/2017  7:16 AM     Interval History:  NO new complaints    Cath no flow limiting CAD NL Ef    Monitor reviewed by me showed no significant ventricular or atrial dysrhythmias.    BP 90+ last PM    Review of Systems   Cardiovascular: Negative for chest pain and palpitations.   Neurological: Negative for dizziness.       Physical Exam   Constitutional: No distress.   HENT:   Mouth/Throat: Mucous membranes are normal.   Neck: No thyroid mass present.   Cardiovascular: Normal rate, regular rhythm and intact distal pulses.    No murmur heard.  Cath site no hematoma   Pulmonary/Chest: Effort normal and breath sounds normal. No respiratory distress. He exhibits no tenderness.   Abdominal: Soft. There is no tenderness.   Musculoskeletal: He exhibits no edema.   Neurological: He has normal strength. He displays no tremor.   Skin: Skin is warm and dry. He is not diaphoretic.   Vitals reviewed.      Hemodynamics:  Temp (24hrs), Av.4 °C (97.6 °F), Min:36.3 °C (97.4 °F), Max:36.6 °C (97.9 °F)  Temperature: 36.4 °C (97.6 °F)  Pulse  Av.2  Min: 63  Max: 97   Blood Pressure: (!) 94/56 mmHg (rn notified)     Respiratory:    Respiration: 18, Pulse Oximetry: 94 %     Work Of Breathing / Effort: Mild  RUL Breath Sounds: Clear, RML Breath Sounds: Clear, RLL Breath Sounds: Clear, ASMITA Breath Sounds: Clear, LLL Breath Sounds: Clear  Fluids:     Weight: (!) 130.5 kg (287 lb 11.2 oz)  GI/Nutrition:  Orders Placed This Encounter   Procedures   • DIET ORDER     Standing Status: Standing      Number of Occurrences: 1      Standing Expiration Date:      Order Specific Question:  Diet:     Answer:  Diabetic [3]     Lab Results:  Recent Labs      17   0253  17   1609  17   0252   WBC  5.5  6.0  5.6   RBC  5.50  5.58  5.46   HEMOGLOBIN  16.3  16.2  16.0   HEMATOCRIT  47.3  48.0  47.6   MCV  86.0  86.0  87.2   MCH  29.6  29.0  29.3    MCHC  34.5  33.8  33.6*   RDW  42.8  42.0  42.5   PLATELETCT  257  248  246   MPV  9.5  9.8  10.1     Recent Labs      03/01/17   0253  03/01/17   1609  03/02/17   0252   SODIUM  137  137  138   POTASSIUM  3.8  4.1  3.9   CHLORIDE  105  104  104   CO2  24  26  23   GLUCOSE  263*  207*  187*   BUN  16  15  24*   CREATININE  0.81  0.81  0.84   CALCIUM  9.6  9.4  9.6     Recent Labs      02/28/17   1529   INR  0.98     Recent Labs      02/28/17   0922   BNPBTYPENAT  16     Recent Labs      02/28/17   0922  02/28/17   1134  02/28/17   1529  03/01/17   1609   TROPONINI  <0.01  <0.01  <0.01  <0.01   BNPBTYPENAT  16   --    --    --      Recent Labs      03/01/17   0253   TRIGLYCERIDE  288*   HDL  31*   LDL  67         Medical Decision Making, by Problem:  Active Hospital Problems    Diagnosis   • Chest pain [R07.9] no flow limiting CAD, somewhat slow flow, may have some degree of endothelial dysfunction form not well controlled DM   • Hypertension associated with diabetes (CMS-HCC) [E11.59, I10]   • Dyslipidemia [E78.5]   • Type 2 diabetes mellitus with other specified complication (CMS-HCC) [E11.69]   • Morbid obesity with BMI of 40.0-44.9, adult (CMS-HCC) [E66.01, Z68.41]       Plan:  Risk factor modification  D/C nitro paste  Reduce lisinopril (BP 90+)  May d/c home fromour standpoint    Core Measures

## 2017-03-02 NOTE — DISCHARGE INSTRUCTIONS
Discharge Instructions    Discharged to home by car with friend. Discharged via wheelchair, hospital escort: Yes.  Special equipment needed: Not Applicable    Be sure to schedule a follow-up appointment with your primary care doctor or any specialists as instructed.     Discharge Plan:   Diet Plan: Discussed  Activity Level: Discussed  Confirmed Follow up Appointment: Appointment Scheduled  Confirmed Symptoms Management: Discussed  Medication Reconciliation Updated: Yes  Influenza Vaccine Indication: Indicated: 9 to 64 years of age  Influenza Vaccine Given - only chart on this line when given: Influenza Vaccine Given (See MAR)    I understand that a diet low in cholesterol, fat, and sodium is recommended for good health. Unless I have been given specific instructions below for another diet, I accept this instruction as my diet prescription.   Other diet: Heart-Healthy Eating Plan  Many factors influence your heart health, including eating and exercise habits. Heart (coronary) risk increases with abnormal blood fat (lipid) levels. Heart-healthy meal planning includes limiting unhealthy fats, increasing healthy fats, and making other small dietary changes. This includes maintaining a healthy body weight to help keep lipid levels within a normal range.  WHAT IS MY PLAN?   Your health care provider recommends that you:  · Get no more than _________% of the total calories in your daily diet from fat.  · Limit your intake of saturated fat to less than _________% of your total calories each day.  · Limit the amount of cholesterol in your diet to less than _________ mg per day.  WHAT TYPES OF FAT SHOULD I CHOOSE?  · Choose healthy fats more often. Choose monounsaturated and polyunsaturated fats, such as olive oil and canola oil, flaxseeds, walnuts, almonds, and seeds.  · Eat more omega-3 fats. Good choices include salmon, mackerel, sardines, tuna, flaxseed oil, and ground flaxseeds. Aim to eat fish at least two times each  "week.  · Limit saturated fats. Saturated fats are primarily found in animal products, such as meats, butter, and cream. Plant sources of saturated fats include palm oil, palm kernel oil, and coconut oil.  · Avoid foods with partially hydrogenated oils in them. These contain trans fats. Examples of foods that contain trans fats are stick margarine, some tub margarines, cookies, crackers, and other baked goods.  WHAT GENERAL GUIDELINES DO I NEED TO FOLLOW?  · Check food labels carefully to identify foods with trans fats or high amounts of saturated fat.  · Fill one half of your plate with vegetables and green salads. Eat 4-5 servings of vegetables per day. A serving of vegetables equals 1 cup of raw leafy vegetables, ½ cup of raw or cooked cut-up vegetables, or ½ cup of vegetable juice.  · Fill one fourth of your plate with whole grains. Look for the word \"whole\" as the first word in the ingredient list.  · Fill one fourth of your plate with lean protein foods.  · Eat 4-5 servings of fruit per day. A serving of fruit equals one medium whole fruit, ¼ cup of dried fruit, ½ cup of fresh, frozen, or canned fruit, or ½ cup of 100% fruit juice.  · Eat more foods that contain soluble fiber. Examples of foods that contain this type of fiber are apples, broccoli, carrots, beans, peas, and barley. Aim to get 20-30 g of fiber per day.  · Eat more home-cooked food and less restaurant, buffet, and fast food.  · Limit or avoid alcohol.  · Limit foods that are high in starch and sugar.  · Avoid fried foods.  · Cook foods by using methods other than frying. Baking, boiling, grilling, and broiling are all great options. Other fat-reducing suggestions include:  ¨ Removing the skin from poultry.  ¨ Removing all visible fats from meats.  ¨ Skimming the fat off of stews, soups, and gravies before serving them.  ¨ Steaming vegetables in water or broth.  · Lose weight if you are overweight. Losing just 5-10% of your initial body weight can " help your overall health and prevent diseases such as diabetes and heart disease.  · Increase your consumption of nuts, legumes, and seeds to 4-5 servings per week. One serving of dried beans or legumes equals ½ cup after being cooked, one serving of nuts equals 1½ ounces, and one serving of seeds equals ½ ounce or 1 tablespoon.  · You may need to monitor your salt (sodium) intake, especially if you have high blood pressure. Talk with your health care provider or dietitian to get more information about reducing sodium.  WHAT FOODS CAN I EAT?  Grains  Breads, including Uz[place], white, basil, wheat, raisin, rye, oatmeal, and Italian. Tortillas that are neither fried nor made with lard or trans fat. Low-fat rolls, including hotdog and hamburger buns and English muffins. Biscuits. Muffins. Waffles. Pancakes. Light popcorn. Whole-grain cereals. Flatbread. [place] toast. Pretzels. Breadsticks. Rusks. Low-fat snacks and crackers, including oyster, saltine, matzo, yadira, animal, and rye. Rice and pasta, including brown rice and those that are made with whole wheat.  Vegetables  All vegetables.  Fruits  All fruits, but limit coconut.  Meats and Other Protein Sources  Lean, well-trimmed beef, veal, pork, and lamb. Chicken and turkey without skin. All fish and shellfish. Wild duck, rabbit, pheasant, and venison. Egg whites or low-cholesterol egg substitutes. Dried beans, peas, lentils, and tofu. Seeds and most nuts.  Dairy  Low-fat or nonfat cheeses, including ricotta, string, and mozzarella. Skim or 1% milk that is liquid, powdered, or evaporated. Buttermilk that is made with low-fat milk. Nonfat or low-fat yogurt.  Beverages  Mineral water. Diet carbonated beverages.  Sweets and Desserts  Sherbets and fruit ices. Honey, jam, marmalade, jelly, and syrups. Meringues and gelatins. Pure sugar candy, such as hard candy, jelly beans, gumdrops, mints, marshmallows, and small amounts of dark chocolate. Cruz food cake.  Eat all sweets  and desserts in moderation.  Fats and Oils  Nonhydrogenated (trans-free) margarines. Vegetable oils, including soybean, sesame, sunflower, olive, peanut, safflower, corn, canola, and cottonseed. Salad dressings or mayonnaise that are made with a vegetable oil. Limit added fats and oils that you use for cooking, baking, salads, and as spreads.  Other  Cocoa powder. Coffee and tea. All seasonings and condiments.  The items listed above may not be a complete list of recommended foods or beverages. Contact your dietitian for more options.  WHAT FOODS ARE NOT RECOMMENDED?  Grains  Breads that are made with saturated or trans fats, oils, or whole milk. Croissants. Butter rolls. Cheese breads. Sweet rolls. Donuts. Buttered popcorn. Chow mein noodles. High-fat crackers, such as cheese or butter crackers.  Meats and Other Protein Sources  Fatty meats, such as hotdogs, short ribs, sausage, spareribs, phillips, ribeye roast or steak, and mutton. High-fat deli meats, such as salami and bologna. Caviar. Domestic duck and goose. Organ meats, such as kidney, liver, sweetbreads, brains, gizzard, chitterlings, and heart.  Dairy  Cream, sour cream, cream cheese, and creamed cottage cheese. Whole milk cheeses, including blue (aleksandr), [place] Andrea, Brie, [place], American, Havarti, Swiss, cheddar, Camembert, and [place].  Whole or 2% milk that is liquid, evaporated, or condensed. Whole buttermilk. Cream sauce or high-fat cheese sauce. Yogurt that is made from whole milk.  Beverages  Regular sodas and drinks with added sugar.  Sweets and Desserts  Frosting. Pudding. Cookies. Cakes other than adrián food cake. Candy that has milk chocolate or white chocolate, hydrogenated fat, butter, coconut, or unknown ingredients. Buttered syrups. Full-fat ice cream or ice cream drinks.  Fats and Oils  Gravy that has suet, meat fat, or shortening. Cocoa butter, hydrogenated oils, palm oil, coconut oil, palm kernel oil. These can often be found in baked  products, candy, fried foods, nondairy creamers, and whipped toppings. Solid fats and shortenings, including phillips fat, salt pork, lard, and butter. Nondairy cream substitutes, such as coffee creamers and sour cream substitutes. Salad dressings that are made of unknown oils, cheese, or sour cream.  The items listed above may not be a complete list of foods and beverages to avoid. Contact your dietitian for more information.     This information is not intended to replace advice given to you by your health care provider. Make sure you discuss any questions you have with your health care provider.     Document Released: 09/26/2009 Document Revised: 01/08/2016 Document Reviewed: 06/11/2015  [employer] Interactive Patient Education ©2016 [employer]      Special Instructions: Chest Pain, Nonspecific  It is often hard to give a specific diagnosis for the cause of chest pain. There is always a chance that your pain could be related to something serious, like a heart attack or a blood clot in the lungs. You need to follow up with your caregiver for further evaluation. More lab tests or other studies such as X-rays, electrocardiography, stress testing, or cardiac imaging may be needed to find the cause of your pain.  Most of the time, nonspecific chest pain improves within 2 to 3 days with rest and mild pain medicine. For the next few days, avoid physical exertion or activities that bring on pain. Do not smoke. Avoid drinking alcohol. Call your caregiver for routine follow-up as advised.   SEEK IMMEDIATE MEDICAL CARE IF:  · You develop increased chest pain or pain that radiates to the arm, neck, jaw, back, or abdomen.   · You develop shortness of breath, increased coughing, or you start coughing up blood.   · You have severe back or abdominal pain, nausea, or vomiting.   · You develop severe weakness, fainting, fever, or chills.   Document Released: 12/18/2006 Document Revised: 03/11/2013 Document Reviewed:  06/06/2008  ExitCare® Patient Information ©2013 [employer].    · Is patient discharged on Warfarin / Coumadin?   No     · Is patient Post Blood Transfusion?  No    Depression / Suicide Risk    As you are discharged from this [facility] facility, it is important to learn how to keep safe from harming yourself.    Recognize the warning signs:  · Abrupt changes in personality, positive or negative- including increase in energy   · Giving away possessions  · Change in eating patterns- significant weight changes-  positive or negative  · Change in sleeping patterns- unable to sleep or sleeping all the time   · Unwillingness or inability to communicate  · Depression  · Unusual sadness, discouragement and loneliness  · Talk of wanting to die  · Neglect of personal appearance   · Rebelliousness- reckless behavior  · Withdrawal from people/activities they love  · Confusion- inability to concentrate     If you or a loved one observes any of these behaviors or has concerns about self-harm, here's what you can do:  · Talk about it- your feelings and reasons for harming yourself  · Remove any means that you might use to hurt yourself (examples: pills, rope, extension cords, firearm)  · Get professional help from the community (Mental Health, Substance Abuse, psychological counseling)  · Do not be alone:Call your Safe Contact- someone whom you trust who will be there for you.  · Call your local CRISIS HOTLINE 418-8748 or 087-810-6881  · Call your local Children's Mobile Crisis Response Team [place] (368) 860-0708 or www.[employer]  · Call the toll free National Suicide Prevention Hotlines   · National Suicide Prevention Lifeline 023-756-JWQR (3254)  · National Hope Line Network 800-SUICIDE (517-7741)

## 2017-03-02 NOTE — PROGRESS NOTES
Bedside report received, Pt care assumed. Tele box on. VSS, Pt assessment complete. Pt AAOX4, no signs of distress noted at this time. POC discussed with Pt/family, verbalizes understanding, no questions at this time. Pt c/o of 0/10 pain. Pt denies any additional needs at this time. Bed in lowest position, bed alarm is on, ambulates with stand by assistance, Pt educated on fall risk and verbalizes understanding, call light within reach, will continue to monitor.

## 2017-03-02 NOTE — CARE PLAN
Problem: Safety  Goal: Will remain free from injury  Outcome: PROGRESSING AS EXPECTED  Fall precautions in place. Bed wheels locked, bed is in the lowest position. Call light in reach. Non-skid socks provided. Patient provides successful verbalization of fall and safety precautions and demonstration of use of call bell. Upper side rails are up. Hourly rounding in progress.         Problem: Knowledge Deficit  Goal: Knowledge of disease process/condition, treatment plan, diagnostic tests, and medications will improve  Outcome: PROGRESSING AS EXPECTED  POC discussed with the patient and family. All questions and concerns addressed and answered. Patient is involved in POC and verbalizes the understanding of the disease process, medications, tests and treatments. Questions on POC encouraged throughout care.

## 2017-03-02 NOTE — PROGRESS NOTES
Bedside report received from day RN. Assumed pt care. Pt sitting in chair. Family at bedside. POC discussed. Pt denies any needs at this time. Call light within reach. Will continue to assess and provide care.

## 2017-03-02 NOTE — PROGRESS NOTES
Discharge instructions given and discussed. Pt verbalized understanding. Paper prescriptions given. Pt discharged in stable condition by wheelchair. VSS, IV removed, tolerated well. Tele box removed, monitor room notified. Pt discharged in stable condition.

## 2017-03-02 NOTE — PROGRESS NOTES
Hospital Medicine Progress Note, Adult, Complex               Author: Jesus Alberto Blandon Date & Time created: 3/1/2017  5:21 PM     Interval History:  43 year old with HLD, Obesity, HTN, DM type 2 presented with CP    2/1 Plan for cardiac cath today. No active chest pain. Patient counseled on lifestyle and diet changes.     Review of Systems:  Review of Systems   Constitutional: Negative for fever and chills.   Respiratory: Negative for cough and shortness of breath.    Cardiovascular: Negative for palpitations and leg swelling.   Gastrointestinal: Negative for abdominal pain and blood in stool.   Musculoskeletal: Negative for myalgias.   Neurological: Negative for dizziness and headaches.   All other systems reviewed and are negative.      Physical Exam:  Physical Exam   Constitutional: He is oriented to person, place, and time. He appears well-developed and well-nourished.   HENT:   Head: Normocephalic and atraumatic.   Mouth/Throat: Oropharynx is clear and moist.   Eyes: Conjunctivae are normal.   Neck: Neck supple.   Cardiovascular: Normal rate and regular rhythm.    Pulmonary/Chest: Breath sounds normal. He has no wheezes.   Abdominal: Soft. Bowel sounds are normal. He exhibits no distension.   Musculoskeletal: Normal range of motion. He exhibits no edema or tenderness.   Neurological: He is alert and oriented to person, place, and time.   Skin: Skin is warm and dry.   Psychiatric: He has a normal mood and affect. His behavior is normal.   Nursing note and vitals reviewed.      Labs:        Invalid input(s): [id number]  Recent Labs      02/28/17   0922  02/28/17   1134  02/28/17   1529  03/01/17   1609   TROPONINI  <0.01  <0.01  <0.01  <0.01   BNPBTYPENAT  16   --    --    --      Recent Labs      02/28/17   0922  03/01/17   0253  03/01/17   1609   SODIUM  139  137  137   POTASSIUM  3.8  3.8  4.1   CHLORIDE  106  105  104   CO2  24  24  26   BUN  13  16  15   CREATININE  0.73  0.81  0.81   MAGNESIUM   --   1.9    --    CALCIUM  9.8  9.6  9.4     Recent Labs      17   1609   ALTSGPT  32   --    --    ASTSGOT  20   --    --    ALKPHOSPHAT  82   --    --    TBILIRUBIN  0.6   --    --    LIPASE  8*   --    --    GLUCOSE  237*  263*  207*     Recent Labs      17   1529  17   1609   RBC  5.82   --   5.50  5.58   HEMOGLOBIN  17.1   --   16.3  16.2   HEMATOCRIT  49.5   --   47.3  48.0   PLATELETCT  258   --   257  248   PROTHROMBTM   --   13.3   --    --    INR   --   0.98   --    --      Recent Labs      17   1609   WBC  5.7  5.5  6.0   NEUTSPOLYS  61.80  52.30   --    LYMPHOCYTES  29.70  38.90   --    MONOCYTES  5.80  6.50   --    EOSINOPHILS  1.80  1.80   --    BASOPHILS  0.70  0.50   --    ASTSGOT  20   --    --    ALTSGPT  32   --    --    ALKPHOSPHAT  82   --    --    TBILIRUBIN  0.6   --    --            Hemodynamics:  Temp (24hrs), Av.3 °C (97.4 °F), Min:36.1 °C (96.9 °F), Max:36.6 °C (97.9 °F)  Temperature: 36.4 °C (97.6 °F)  Pulse  Av.9  Min: 68  Max: 97   Blood Pressure: 104/66 mmHg     Respiratory:    Respiration: 19, Pulse Oximetry: 98 %        RUL Breath Sounds: Clear, RML Breath Sounds: Clear, RLL Breath Sounds: Clear, ASMITA Breath Sounds: Clear, LLL Breath Sounds: Clear  Fluids:  No intake or output data in the 24 hours ending 17 1721  Weight: (!) 126.5 kg (278 lb 14.1 oz)  GI/Nutrition:  Orders Placed This Encounter   Procedures   • DIET ORDER     Standing Status: Standing      Number of Occurrences: 1      Standing Expiration Date:      Order Specific Question:  Diet:     Answer:  Diabetic [3]     Medical Decision Making, by Problem:  Active Hospital Problems    Diagnosis   • Chest pain [R07.9]  -rule out ACS  -trops negative  -plan for cath today  -continue IVF to prevent NORMAN  -continue aspirin and statin   • Hypertension associated with diabetes (CMS-HCC) [E11.59, I10]  -well  controlled   -continue lisinopril   • Dyslipidemia [E78.5]  -continue atorvastatin   • Type 2 diabetes mellitus with other specified complication (CMS-HCC) [E11.69]  -continue ISS and serial accu-checks   • Morbid obesity with BMI of 40.0-44.9, adult (CMS-HCC) [E66.01, Z68.41]  -counseled on lifestyle changes and diet modifcation     Plan of care discussed with patient and nursing staff    Labs reviewed, Medications reviewed, Radiology images reviewed and EKG reviewed          DVT prophylaxis - mechanical: SCDs

## 2017-03-02 NOTE — PROGRESS NOTES
Pt resting comfortably in bed. No signs of distress. Bed locked and in lowest position. Call light within reach. Will continue to assess and provide care.

## 2017-03-02 NOTE — PROCEDURES
This is a 47-year-old male.  Weight is 291 pounds and height is 179 cm.    FINDINGS:  Spirometry demonstrates FEV1/FVC ratio 84%.  FEV1 is 3.58 liters or   86% of predicted.  FVC is 4.28 liters or 81% of predicted.  There is no   bronchodilator response.  The plethysmography reveals a total lung capacity of   6.72 or 95% of predicted.  The residual volume is 2.20 or 102% of predicted.    The corrected diffusion lung capacity for carbon monoxide is 134% of   predicted.  The MVV is 42% of predicted and the ERV is 26% predicted.  Flow   volume loop demonstrates obstruction on the inspiratory limb.    IMPRESSION:  This is a normal study.  The FVC is reduced in the setting of a   low voluntary ventilation suggestive of poor effort.       ____________________________________     MD KIARA Bernal / BUDDY    DD:  03/01/2017 17:17:20  DT:  03/01/2017 19:30:53    D#:  [id number]  Job#:  [id number]

## 2017-03-03 NOTE — CONSULTS
Diabetes education: Met with Arnulfo before discharge, at his request. Pt has a hx of diabetes on oral agents for his diabetes. Pt had questions regarding outpatient class. Pt has not had any diabetes education.  Discussed what diabetes was, what effects blood sugars, need for protein and carbohydrates with every meal, how his medication works, goals for blood sugar, foot care, and importance of following up with PCP and diabetes class.  Pt had a meter, but it was not working (also discussed calling the 1800 # to get it replaced). CDE gave a one touch ultra meter and 30 strips (to test once a day rotating ac and hs). Pt to follow up with PCP and get a prescription for strips. Also discussed [employer] Reli-On meter ($9.00) and strips ($9.00 for 50).  Questions were answered and handouts given to cover areas discussed.

## 2017-03-06 NOTE — DISCHARGE SUMMARY
CHIEF COMPLAINT ON ADMISSION  Chief Complaint   Patient presents with   • Shortness of Breath   • Chest Pain     left cp onset this am       CODE STATUS  Prior    HPI & HOSPITAL COURSE  Please review H&P for details on admission. This is a 47 year old male with a PMHx of  DM type 2, hyperlipidemia who presented with chest pain during a pulmonary function test. Initial work up was significant for Troponin 0.01 x3. EKG sinus rhythm, heart rate 82.  No evidence of acute ST segment changes. CXR with no acute cardiopulmonary abnormalities identified. CTA of the chest with contrast showed no CT evidence of pulmonary embolism, unchanged linear atelectasis scarring, incompletely evaluated extrahepatic biliary ductal dilatation grossly similar. Patient was given a full dose of aspirin and admitted to the telemetry unit with cardiac monitoring and serial troponin measurement. Cardiology was consulted and recommended cardiac cath. Patient underwent cardiac cath on 3/1/17 which revealed normal coronary arteries and EF 70%. Patient's pain was attributed to costochondritis. He was told to take motrin 600 mg Q6 hours PRN with food. He was asked to follow up with his PCP in 1 week. Patient's pain had resolved and his vitals remained stable. He was ambulating independently without any issues.     Therefore, he is discharged in good and stable condition with close outpatient follow-up.    DISCHARGE PROBLEM LIST  Active Problems:    Dyslipidemia (Chronic) POA: Yes    Type 2 diabetes mellitus with other specified complication (CMS-HCC) (Chronic) POA: Yes    Morbid obesity with BMI of 40.0-44.9, adult (CMS-HCC) POA: Yes    Hypertension associated with diabetes (CMS-[facility]) POA: Yes  Resolved Problems:    Chest pain POA: Yes      FOLLOW UP  Future Appointments  Date Time Provider Department Center   3/20/2017 4:10 PM Nelida Bearden PA-C [facility]   5/4/2017 8:40 AM Ismael Nevarez M.D. [facility] None     Nelida Bearden PA-C  [address]  [place] [address]  [phone]    Schedule an appointment as soon as possible for a visit in 1 week        MEDICATIONS ON DISCHARGE   Arnulfo Cotton   Home Medication Instructions BAILEY:[id number]    Printed on:03/05/17 1939   Medication Information                      aspirin EC 81 MG EC tablet  Take 1 Tab by mouth every day.             glipiZIDE (GLUCOTROL) 5 MG Tab  Take 1 Tab by mouth 2 times daily, before breakfast and dinner.             ibuprofen (MOTRIN) 600 MG Tab  Take 1 Tab by mouth 3 times a day as needed for Moderate Pain, Fever or Inflammation.             lisinopril (PRINIVIL) 10 MG Tab  Take 1 Tab by mouth every day.             metformin (GLUCOPHAGE) 500 MG Tab  Take 1 Tab by mouth 2 times a day, with meals.             therapeutic multivitamin-minerals (THERAGRAN-M) Tab  Take 1 Tab by mouth every day.                 DIET  No orders of the defined types were placed in this encounter.       ACTIVITY  As tolerated.    CONSULTATIONS  Cardiology Dr Lennon    PROCEDURES  Cardiac cath    ECHOCARDIOGRAM-COMP W/ CONT   Final Result      CT-CTA CHEST PULMONARY ARTERY W/ RECONS   Final Result         1. No CT evidence of pulmonary embolism.      2. Unchanged linear atelectasis/scarring.      3. Incompletely evaluated extrahepatic biliary ductal dilatation, grossly similar.         DX-CHEST-PORTABLE (1 VIEW)   Final Result         1. No acute cardiopulmonary abnormalities are identified.          LABORATORY  Lab Results   Component Value Date/Time    SODIUM 138 03/02/2017 02:52 AM    POTASSIUM 3.9 03/02/2017 02:52 AM    CHLORIDE 104 03/02/2017 02:52 AM    CO2 23 03/02/2017 02:52 AM    GLUCOSE 187* 03/02/2017 02:52 AM    BUN 24* 03/02/2017 02:52 AM    CREATININE 0.84 03/02/2017 02:52 AM        Lab Results   Component Value Date/Time    WBC 5.6 03/02/2017 02:52 AM    HEMOGLOBIN 16.0 03/02/2017 02:52 AM    HEMATOCRIT 47.6 03/02/2017 02:52 AM    PLATELET COUNT 246 03/02/2017 02:52 AM        Total time of the  discharge process exceeds 31 minutes.

## 2017-03-10 ENCOUNTER — TELEPHONE (OUTPATIENT)
Dept: MEDICAL GROUP | Age: 47
End: 2017-03-10

## 2017-03-10 NOTE — TELEPHONE ENCOUNTER
Phone Number Called: [phone] (home)       Message: Unable to leave message for patient. Will try again another time    Left Message for patient to call back: no      [phone] (home)

## 2017-03-14 NOTE — TELEPHONE ENCOUNTER
Kristin Worthington, [facility]                     I tried calling this patient, but both of his listed telephone numbers are out of service.

## 2017-03-20 ENCOUNTER — APPOINTMENT (OUTPATIENT)
Dept: MEDICAL GROUP | Age: 47
End: 2017-03-20
Payer: COMMERCIAL

## 2017-04-03 ENCOUNTER — APPOINTMENT (OUTPATIENT)
Dept: MEDICAL GROUP | Age: 47
End: 2017-04-03
Payer: COMMERCIAL

## 2017-04-25 ENCOUNTER — HOSPITAL ENCOUNTER (OUTPATIENT)
Facility: MEDICAL CENTER | Age: 47
End: 2017-04-25
Attending: PHYSICIAN ASSISTANT
Payer: COMMERCIAL

## 2017-04-25 ENCOUNTER — OFFICE VISIT (OUTPATIENT)
Dept: MEDICAL GROUP | Age: 47
End: 2017-04-25
Payer: COMMERCIAL

## 2017-04-25 VITALS
DIASTOLIC BLOOD PRESSURE: 72 MMHG | SYSTOLIC BLOOD PRESSURE: 124 MMHG | TEMPERATURE: 98.1 F | OXYGEN SATURATION: 96 % | WEIGHT: 283 LBS | HEART RATE: 95 BPM | BODY MASS INDEX: 39.62 KG/M2 | HEIGHT: 71 IN

## 2017-04-25 DIAGNOSIS — E78.5 DYSLIPIDEMIA ASSOCIATED WITH TYPE 2 DIABETES MELLITUS (HCC): ICD-10-CM

## 2017-04-25 DIAGNOSIS — I15.2 HYPERTENSION ASSOCIATED WITH DIABETES (HCC): ICD-10-CM

## 2017-04-25 DIAGNOSIS — E11.59 HYPERTENSION ASSOCIATED WITH DIABETES (HCC): ICD-10-CM

## 2017-04-25 DIAGNOSIS — E11.69 DYSLIPIDEMIA ASSOCIATED WITH TYPE 2 DIABETES MELLITUS (HCC): ICD-10-CM

## 2017-04-25 DIAGNOSIS — E66.01 MORBID OBESITY WITH BMI OF 40.0-44.9, ADULT (HCC): ICD-10-CM

## 2017-04-25 PROBLEM — J06.9 VIRAL UPPER RESPIRATORY TRACT INFECTION: Status: RESOLVED | Noted: 2017-02-16 | Resolved: 2017-04-25

## 2017-04-25 PROBLEM — J20.8 VIRAL BRONCHITIS: Status: RESOLVED | Noted: 2017-02-16 | Resolved: 2017-04-25

## 2017-04-25 PROCEDURE — 99214 OFFICE O/P EST MOD 30 MIN: CPT | Performed by: PHYSICIAN ASSISTANT

## 2017-04-25 PROCEDURE — 92250 FUNDUS PHOTOGRAPHY W/I&R: CPT | Mod: TC | Performed by: PHYSICIAN ASSISTANT

## 2017-04-25 RX ORDER — GLIPIZIDE 5 MG/1
5 TABLET ORAL
Qty: 60 TAB | Refills: 3 | Status: SHIPPED | OUTPATIENT
Start: 2017-04-25 | End: 2018-07-23 | Stop reason: SDUPTHER

## 2017-04-25 RX ORDER — LANCETS 30 GAUGE
EACH MISCELLANEOUS
Qty: 100 EACH | Refills: 3 | Status: SHIPPED | OUTPATIENT
Start: 2017-04-25 | End: 2018-07-23 | Stop reason: SDUPTHER

## 2017-04-25 NOTE — PROGRESS NOTES
Subjective:     Chief Complaint   Patient presents with   • Hypertension     NOt to pass DOT physical   • Diabetes Mellitus     Arnulfo Cotton is a 47 y.o. male here today for evaluation and management of:    Dyslipidemia associated with type 2 diabetes mellitus (CMS-[facility])/ morbid obesity  Stable. Currently taking Metformin 500 mg BID and glipizide 5 mg once daily as directed.  Denies side effects or hypoglycemic events.  Denies side effects--no myalgias or abdominal pain.  He is not monitoring blood sugar regularly at home. Couldn't afford glucometer  Reports diet is improved.  He is exercising regularly-- daily  Last eye exam: ? -- likely 2014 Will complete in office today  Denies polyuria, polydipsia, blurred vision, or neuropathies.  Heis not taking ASA daily but will restart  He denies dizziness, claudication, or chest pain.  Has been losing weight. Down from size 44 to 40.     Hypertension associated with diabetes (CMS-[facility])  Stable per patient. Lisinopril was discontinued in hospital due to hypotension. However, cannot pass DOT physical due to elevated BP.  He is not monitoring BP at home.   Denies symptoms low BP: light-headed, tunnel-vision, unusual fatigue.   Denies symptoms high BP:pounding headache, visual changes, palpitations, flushed face.   Denies medicine side effects: unusual fatigue, slow heartbeat, foot/leg swelling, cough.      Tomorrow adopting three foster children (siblings) oldest 13 and youngest is 6 mos.    ROS   Denies any recent fevers or chills.   No nausea or vomiting. No diarrhea.   No recent chest pains or shortness of breath since hospitalization.   No lower extremity edema.    No Known Allergies    Current medicines (including changes today)  Current Outpatient Prescriptions   Medication Sig Dispense Refill   • metformin (GLUCOPHAGE) 500 MG Tab Take 1 Tab by mouth 2 times a day, with meals. 60 Tab 3   • glipiZIDE (GLUCOTROL) 5 MG Tab Take 1 Tab by mouth 2 times daily, before  "breakfast and dinner. 60 Tab 3   • therapeutic multivitamin-minerals (THERAGRAN-M) Tab Take 1 Tab by mouth every day.     • ibuprofen (MOTRIN) 600 MG Tab Take 1 Tab by mouth 3 times a day as needed for Moderate Pain, Fever or Inflammation. (Patient not taking: Reported on 4/25/2017) 30 Tab 0   • lisinopril (PRINIVIL) 10 MG Tab Take 1 Tab by mouth every day. (Patient not taking: Reported on 4/25/2017) 30 Tab 3   • aspirin EC 81 MG EC tablet Take 1 Tab by mouth every day. (Patient not taking: Reported on 4/25/2017) 30 Tab 0     No current facility-administered medications for this visit.       Patient Active Problem List    Diagnosis Date Noted   • Mixed simple and mucopurulent chronic bronchitis (CMS-HCC) 02/16/2017   • Hypertension associated with diabetes (CMS-HCC) 09/14/2016   • Vitamin D deficiency 10/14/2015   • Dyslipidemia 07/05/2015   • Dyslipidemia associated with type 2 diabetes mellitus (CMS-HCC) 07/05/2015   • Morbid obesity with BMI of 40.0-44.9, adult (CMS-HCC) 07/05/2015       Family History   Problem Relation Age of Onset   • Diabetes Mother    • Hypertension Mother    • Diabetes Father    • Cancer Paternal Uncle      lymphoma, skin   • Diabetes Maternal Grandmother    • Cancer Maternal Grandfather      stomach   • Diabetes Paternal Grandfather    • Heart Disease Neg Hx    • Stroke Neg Hx           Objective:     Blood pressure 124/72, pulse 95, temperature 36.7 °C (98.1 °F), height 1.791 m (5' 10.5\"), weight 128.368 kg (283 lb), SpO2 96 %. Body mass index is 40.02 kg/(m^2).     Physical Exam:  Gen: Well developed, well nourished in no acute distress. Morbidly obese male.  Skin: Pink, warm, and dry  HEENT: conjunctiva non-injected, sclera non-icteric. EOMs intact.   Nasal mucosa without edema nor erythema. No facial tenderness  Pinna normal. TM pearly gray.   Oral mucous membranes pink and moist with no lesions.  Neck: Supple, trachea midline. No adenopathy or masses in the neck or supraclavicular " regions.  Lungs: Effort is normal. Clear to auscultation bilaterally with good excursion.  CV: regular rate and rhythm.  Abdomen: soft, nontender, + BS.Unable to determine HSM due to body habitus. No CVAT  Monofilament testing with a 10 gram force: sensation intact: intact bilaterally  Visual Inspection: Feet without maceration, ulcers, fissures.  Pedal pulses: intact bilaterally  Ext: no edema, color normal, vascularity normal, temperature normal  Alert and oriented Eye contact is good, speech goal directed, affect calm    Lab Results   Component Value Date/Time    GLYCOHEMOGLOBIN 10.8* 02/28/2017 09:22 AM      Reviewed and discussed above labs with patient in office.    Assessment and Plan:   The following treatment plan was discussed:     1. Dyslipidemia associated with type 2 diabetes mellitus (CMS-HCC) - uncontrolled per A1c from 2/28/17. Pt is not monitoring at home. Home monitoring supplies sent to pharmacy. If unable to afford, information on [facility] was provided.   - monofilament completed in office.   - Retinal exam performed in office.   - Increase glipizide to BID. Continue Metformin at 500 mg BID. Will increase it if no improvement. POCT Retinal Eye Exam    MICROALBUMIN CREAT RATIO URINE    metformin (GLUCOPHAGE) 500 MG Tab    Blood Glucose Monitoring Suppl Device    Lancets Misc    Blood Glucose Monitoring Suppl SUPPLIES Misc    glipiZIDE (GLUCOTROL) 5 MG Tab    Diabetic Monofilament LE Exam   2. Hypertension associated with diabetes (CMS-HCC) -Stable in office without taking Lisinopril. Will continue to monitor. Encouraged he monitor at home.  Monitor labs regularly.    3. Morbid obesity with BMI of 40.0-44.9, adult (CMS-HCC) -Counseled on diet modification and exercise.  Obesity Counseling (no charge) - Patient identified as having weight management issue.  Appropriate orders and counseling given.     - HM: Discussed Hep B immunization--not interested at this time. Will think about it and discuss  further at next OV.  -Any change or worsening of signs or symptoms, patient encouraged to follow-up or report to emergency room for further evaluation. Patient verbalizes understanding and agrees.    Followup: Return in about 1 month (around 5/25/2017) for follow-up on DM with DM Nurse (DM-RN visit). Sooner if needed. Instructed to bring home glucose logs.

## 2017-04-25 NOTE — MR AVS SNAPSHOT
"        Arnulfo Cotton   2017 10:50 AM   Office Visit   MRN: [id number]    Department:  [address] [place] [address]   Dept Phone:  [phone]    Description:  Male : 1970   Provider:  Nelida Bearden PA-C           Reason for Visit     Hypertension didn't pass for DOT physical    Diabetes Mellitus           Allergies as of 2017     No Known Allergies      You were diagnosed with     Dyslipidemia associated with type 2 diabetes mellitus (CMS-HCC)   [218404]       Hypertension associated with diabetes (CMS-HCC)   [492247]       Morbid obesity with BMI of 40.0-44.9, adult (CMS-HCC)   [442302]       Type 2 diabetes mellitus with other specified complication (CMS-HCC)   [6690940]         Vital Signs     Blood Pressure Pulse Temperature Height Weight Body Mass Index    124/72 mmHg 95 36.7 °C (98.1 °F) 1.791 m (5' 10.5\") 128.368 kg (283 lb) 40.02 kg/m2    Oxygen Saturation Smoking Status                96% Never Smoker           Basic Information     Date Of Birth Sex Race Ethnicity Preferred Language    1970 Male White Non- English      Your appointments     May 04, 2017  8:40 AM   New Patient Pulmonary with Ismael Nevarez M.D.   [facility] Pul[facility] Medicine (--)    [address] [place] [address] [address]  [facility] [address] [phone]              Problem List              ICD-10-CM Priority Class Noted - Resolved    Dyslipidemia (Chronic) E78.5   2015 - Present    Dyslipidemia associated with type 2 diabetes mellitus (CMS-HCC) E11.69, E78.5   2015 - Present    Morbid obesity with BMI of 40.0-44.9, adult (CMS-HCC) E66.01, Z68.41   2015 - Present    Vitamin D deficiency E55.9   10/14/2015 - Present    Hypertension associated with diabetes (CMS-HCC) E11.59, I10   2016 - Present    Mixed simple and mucopurulent chronic bronchitis (CMS-HCC) J41.8   2017 - Present      Health Maintenance        Date Due Completion Dates    IMM HEP B VACCINE (1 of 3 - Primary Series) 1970 ---   " RETINAL SCREENING 10/18/2015 10/18/2014    URINE ACR / MICROALBUMIN 6/13/2016 6/13/2015    A1C SCREENING 8/28/2017 2/28/2017, 9/5/2016, 10/14/2015, 7/2/2015, 6/13/2015, 6/11/2015, 4/7/2014    FASTING LIPID PROFILE 3/1/2018 3/1/2017, 6/13/2015    SERUM CREATININE 3/2/2018 3/2/2017, 3/1/2017, 3/1/2017, 2/28/2017, 9/5/2016, 6/13/2015, 4/6/2014, 10/10/2013    DIABETES MONOFILAMENT / LE EXAM 4/25/2018 4/25/2017, 6/14/2015    IMM DTaP/Tdap/Td Vaccine (2 - Td) 10/6/2025 10/6/2015            Current Immunizations     Influenza Vaccine Quad Inj (Pf) 3/2/2017 12:17 PM    Influenza Vaccine Quad Inj (Preserved) 10/14/2015  2:45 PM    Pneumococcal polysaccharide vaccine (PPSV-23) 10/14/2015  2:45 PM    Tdap Vaccine 10/6/2015    Tuberculin Skin Test 8/17/2015 10:25 AM      Below and/or attached are the medications your provider expects you to take. Review all of your home medications and newly ordered medications with your provider and/or pharmacist. Follow medication instructions as directed by your provider and/or pharmacist. Please keep your medication list with you and share with your provider. Update the information when medications are discontinued, doses are changed, or new medications (including over-the-counter products) are added; and carry medication information at all times in the event of emergency situations     Allergies:  No Known Allergies          Medications  Valid as of: April 25, 2017 - 11:29 AM    Generic Name Brand Name Tablet Size Instructions for use    Aspirin (Tablet Delayed Response) aspirin 81 MG Take 1 Tab by mouth every day.        Blood Glucose Monitoring Suppl (Device) Blood Glucose Monitoring Suppl  Dispense:Glucometer covered by patient's insurance        Blood Glucose Monitoring Suppl (Misc) Blood Glucose Monitoring Suppl SUPPLIES Test strips for glucometer covered by patient's insurance. Sig: use once daily and prn ssx high or low sugar.        GlipiZIDE (Tab) GLUCOTROL 5 MG Take 1 Tab by  mouth 2 times daily, before breakfast and dinner.        Ibuprofen (Tab) MOTRIN 600 MG Take 1 Tab by mouth 3 times a day as needed for Moderate Pain, Fever or Inflammation.        Lancets (Misc) Lancets  Lancets for glucometer covered by patient's insurance. Sig: use once daily and prn ssx high or low sugar.        Lisinopril (Tab) PRINIVIL 10 MG Take 1 Tab by mouth every day.        MetFORMIN HCl (Tab) GLUCOPHAGE 500 MG Take 1 Tab by mouth 2 times a day, with meals.        Multiple Vitamins-Minerals (Tab) THERAGRAN-M  Take 1 Tab by mouth every day.        .                 Medicines prescribed today were sent to:     [id number] - [place], [place] - [address]    [address] [address]    Phone: [phone] Fax: [phone]    Open 24 Hours?: No      Medication refill instructions:       If your prescription bottle indicates you have medication refills left, it is not necessary to call your provider’s office. Please contact your pharmacy and they will refill your medication.    If your prescription bottle indicates you do not have any refills left, you may request refills at any time through one of the following ways: The online [employer] system (except Urgent Care), by calling your provider’s office, or by asking your pharmacy to contact your provider’s office with a refill request. Medication refills are processed only during regular business hours and may not be available until the next business day. Your provider may request additional information or to have a follow-up visit with you prior to refilling your medication.   *Please Note: Medication refills are assigned a new Rx number when refilled electronically. Your pharmacy may indicate that no refills were authorized even though a new prescription for the same medication is available at the pharmacy. Please request the medicine by name with the pharmacy before contacting your provider for a refill.        Your To Do List      Future Labs/Procedures Complete By Expires    MICROALBUMIN CREAT RATIO URINE  As directed 4/26/2018         MyChart Access Code: Activation code not generated  Current [employer] Status: Active

## 2017-06-02 ENCOUNTER — TELEPHONE (OUTPATIENT)
Dept: MEDICAL GROUP | Age: 47
End: 2017-06-02

## 2017-06-02 NOTE — TELEPHONE ENCOUNTER
ESTABLISHED PATIENT PRE-VISIT PLANNING     Note: Patient will not be contacted if there is no indication to call.     1.  Reviewed notes from the last few office visits within the medical group: Yes    2.  If any orders were placed at last visit or intended to be done for this visit (i.e. 6 mos follow-up), do we have Results/Consult Notes?        •  Labs - Labs ordered, completed and results are in chart.       •  Imaging - Imaging was not ordered at last office visit.       •  Referrals - No referrals were ordered at last office visit.    3. Is this appointment scheduled as a Hospital Follow-Up? No    4.  Immunizations were updated in [place] using [employer]?: Yes       •  [employer] Recommendations: HEPATITIS A  MMR  TD VARICELLA (Chicken Pox)     5.  Patient is due for the following Health Maintenance Topics:   Health Maintenance Due   Topic Date Due   • IMM HEP B VACCINE (1 of 3 - Primary Series) 1970       - Patient has completed FLU, PNEUMOVAX (PPSV23) and TDAP Immunization(s) per Web[employer]. Chart has been updated.    6.  Patient was not informed to arrive 15 min prior to their scheduled appointment and bring in their medication bottles.

## 2017-06-05 ENCOUNTER — TELEPHONE (OUTPATIENT)
Dept: MEDICAL GROUP | Age: 47
End: 2017-06-05

## 2017-06-05 NOTE — TELEPHONE ENCOUNTER
Pt no showed his DM-RN appointment today.  Please call patient and have him reschedule with myself (or another provider) and [facility].

## 2017-06-05 NOTE — TELEPHONE ENCOUNTER
[facility] ONLY    Phone Number Called: [phone] (home)     Message: Pt has been rescheduled. He states that he doesn't have enough money for the co-pay and would like to reschedule the appointment for a couple weeks out.     Left Message for patient to call back: no

## 2018-07-19 ENCOUNTER — TELEPHONE (OUTPATIENT)
Dept: MEDICAL GROUP | Age: 48
End: 2018-07-19

## 2018-07-19 NOTE — TELEPHONE ENCOUNTER
Phone Number Called: [phone] (home)     Message: I have tried to contact the pt to see if he can come in on Monday at 3 pm to see our Diabetic nurse at 3 pm before he sees Nelida for his appointment. He did not answer. Left a message for the pt to call back.    Left Message for patient to call back: yes

## 2018-07-23 ENCOUNTER — OFFICE VISIT (OUTPATIENT)
Dept: MEDICAL GROUP | Age: 48
End: 2018-07-23
Payer: COMMERCIAL

## 2018-07-23 ENCOUNTER — APPOINTMENT (OUTPATIENT)
Dept: MEDICAL GROUP | Age: 48
End: 2018-07-23
Payer: COMMERCIAL

## 2018-07-23 VITALS
SYSTOLIC BLOOD PRESSURE: 120 MMHG | OXYGEN SATURATION: 96 % | HEIGHT: 70 IN | WEIGHT: 271.6 LBS | BODY MASS INDEX: 38.88 KG/M2 | HEART RATE: 93 BPM | DIASTOLIC BLOOD PRESSURE: 84 MMHG | TEMPERATURE: 98.6 F

## 2018-07-23 DIAGNOSIS — E55.9 VITAMIN D DEFICIENCY: ICD-10-CM

## 2018-07-23 DIAGNOSIS — E78.5 DYSLIPIDEMIA ASSOCIATED WITH TYPE 2 DIABETES MELLITUS (HCC): ICD-10-CM

## 2018-07-23 DIAGNOSIS — I10 ESSENTIAL HYPERTENSION: ICD-10-CM

## 2018-07-23 DIAGNOSIS — E11.69 DYSLIPIDEMIA ASSOCIATED WITH TYPE 2 DIABETES MELLITUS (HCC): ICD-10-CM

## 2018-07-23 DIAGNOSIS — R07.9 CHEST PAIN, UNSPECIFIED TYPE: ICD-10-CM

## 2018-07-23 DIAGNOSIS — B37.0 THRUSH: ICD-10-CM

## 2018-07-23 DIAGNOSIS — E66.9 OBESITY (BMI 30-39.9): ICD-10-CM

## 2018-07-23 DIAGNOSIS — M25.532 WRIST PAIN, LEFT: ICD-10-CM

## 2018-07-23 LAB
HBA1C MFR BLD: 12.1 % (ref ?–5.8)
INT CON NEG: NORMAL
INT CON POS: NORMAL

## 2018-07-23 PROCEDURE — 83036 HEMOGLOBIN GLYCOSYLATED A1C: CPT | Performed by: PHYSICIAN ASSISTANT

## 2018-07-23 PROCEDURE — 93000 ELECTROCARDIOGRAM COMPLETE: CPT | Performed by: PHYSICIAN ASSISTANT

## 2018-07-23 PROCEDURE — 92250 FUNDUS PHOTOGRAPHY W/I&R: CPT | Mod: TC | Performed by: PHYSICIAN ASSISTANT

## 2018-07-23 PROCEDURE — 99215 OFFICE O/P EST HI 40 MIN: CPT | Mod: 25 | Performed by: PHYSICIAN ASSISTANT

## 2018-07-23 RX ORDER — DAPAGLIFLOZIN 5 MG/1
1 TABLET, FILM COATED ORAL DAILY
Qty: 30 TAB | Refills: 11 | Status: SHIPPED | OUTPATIENT
Start: 2018-07-23 | End: 2018-08-20 | Stop reason: CLARIF

## 2018-07-23 RX ORDER — GLIPIZIDE 5 MG/1
5 TABLET ORAL
Qty: 180 TAB | Refills: 3 | Status: SHIPPED | OUTPATIENT
Start: 2018-07-23 | End: 2019-12-16 | Stop reason: SDUPTHER

## 2018-07-23 RX ORDER — DAPAGLIFLOZIN 5 MG/1
1 TABLET, FILM COATED ORAL DAILY
Qty: 30 TAB | Refills: 11 | Status: SHIPPED | OUTPATIENT
Start: 2018-07-23 | End: 2018-07-23 | Stop reason: SDUPTHER

## 2018-07-23 RX ORDER — LANCETS 30 GAUGE
EACH MISCELLANEOUS
Qty: 100 EACH | Refills: 3 | Status: SHIPPED
Start: 2018-07-23 | End: 2020-06-04

## 2018-07-23 NOTE — PROGRESS NOTES
"  Subjective:     HPI    Chief Complaint   Patient presents with   • Diabetes Mellitus       Arnulfo Cotton is a 48 y.o. male who presents for follow up of chronic conditions of diabetes mellitus, hypertension, hyperlipidemia, .    RN-CDE notes reviewed including HPI for DM, HTN, Hyperlipidemia.\  Exercise frequency and limitations reviewed.  Feet symptoms reviewed.  Nutritional status reviewed.  Retinal eye exam history reviewed.    Patient additionally reports:    Chest pain  Intermittently for the last couple of months.  Last occurrence 5 days ago. Last about 1 year.   Reports occurs at rest. Occurs every couple of months.  Reports Aleve helps sometimes.   Reports strenuous job. Really likes he pulled muscle. Would like reassurance.     Essential hypertension  Reports he has not been taking lisinopril due to symptoms of dizziness from the medication.  Has been monitoring blood pressure at home. Reports systolic readings in 120s and diastolic below 80.     Wrist pain, left  Reports for last few months has been experiencing left wrist pain--lateral wrist   Feels like it \"pops out\"  No specific injury. Reports his job is very physical--wondering if he did something then.  Will duct tape it to hold in place while working--helps.  Has not tried any other treatments.  No numbness or tingling in had    Thrush  Reports area on his tongue that is sore. Sometimes has build up of white and if scrapped will bleed.  Wondering what he needs to do.      He indicates that he is feeling well and denies any symptoms referable to the above diagnoses. Specifically denies  palpitations, dyspnea, orthopnea, PND or peripheral edema. Also denies polyuria, polydipsia, urinary complaints, abdominal complaints, myalgias, numbness, weakness or other related symptoms.     Patient Active Problem List    Diagnosis Date Noted   • Uncontrolled type 2 diabetes mellitus with complication, without long-term current use of insulin ([facility]) 07/23/2018 "   • Mixed simple and mucopurulent chronic bronchitis (HCC) 02/16/2017   • Essential hypertension 09/14/2016   • Vitamin D deficiency 10/14/2015   • Dyslipidemia 07/05/2015   • Dyslipidemia associated with type 2 diabetes mellitus (HCC) 07/05/2015   • Obesity (BMI 30-39.9) 07/05/2015       Health Maintenance/Immunizations:   Health Maintenance Topics with due status: Overdue       Topic Date Due    IMM HEP B VACCINE 1970    FASTING LIPID PROFILE 03/01/2018-- ordered today    SERUM CREATININE 03/02/2018-- ordered today    URINE ACR / MICROALBUMIN 04/25/2018-- collected today.    RETINAL SCREENING 04/26/2018-- performed today       Current medications including changes today:  Current Outpatient Prescriptions   Medication Sig Dispense Refill   • metFORMIN (GLUCOPHAGE) 500 MG Tab Take 1 Tab by mouth 2 times a day, with meals. 180 Tab 3   • glipiZIDE (GLUCOTROL) 5 MG Tab Take 1 Tab by mouth 2 times daily, before breakfast and dinner. 180 Tab 3   • Blood Glucose Monitoring Suppl Device Dispense:Glucometer covered by patient's insurance 1 Device 0   • Blood Glucose Monitoring Suppl Supplies Misc Test strips for glucometer covered by patient's insurance. Sig: use once daily and prn ssx high or low sugar. 100 Each 3   • Lancets Misc Lancets for glucometer covered by patient's insurance. Sig: use once daily and prn ssx high or low sugar. 100 Each 3   • Dapagliflozin Propanediol (FARXIGA) 5 MG Tab Take 1 Tab by mouth every day. 30 Tab 11   • aspirin EC 81 MG EC tablet Take 1 Tab by mouth every day. 30 Tab 0   • therapeutic multivitamin-minerals (THERAGRAN-M) Tab Take 1 Tab by mouth every day.       No current facility-administered medications for this visit.        Allergies: No Known Allergies     Social History   Substance Use Topics   • Smoking status: Never Smoker   • Smokeless tobacco: Former User     Types: Chew     Quit date: 1/1/2010      Comment: 25 years   • Alcohol use No       Family History   Problem  "Relation Age of Onset   • Diabetes Mother    • Hypertension Mother    • Diabetes Father    • Cancer Paternal Uncle      lymphoma, skin   • Diabetes Maternal Grandmother    • Cancer Maternal Grandfather      stomach   • Diabetes Paternal Grandfather    • Heart Disease Neg Hx    • Stroke Neg Hx        ROS  Pertinent ROS findings as above.   All other systems reviewed and are negative except as per HPI.     Objective:     /84 Comment: large cuff  Pulse 93   Temp 37 °C (98.6 °F)   Ht 1.778 m (5' 10\")   Wt 123.2 kg (271 lb 9.6 oz)   SpO2 96%   BMI 38.97 kg/m²      Alert, oriented in no acute distress. Obese male.   Eye contact is good, speech goal directed, affect calm.  HEENT: EOMI, PERRL, conjunctiva non-injected, sclera non-icteric.  Nares patent with no significant congestion or drainage.  Fely pinnae, external auditory canals, TM pearly gray with normal light reflex bilaterally.  Oral mucous membranes pink and moist with no lesions. Right lateral aspect of tongue with area of smoothed membranes and irritation. Small creamy white lesion.  Neck supple with no cervical lymphadenopathy, JVD, palpable thyroid nodules or carotid bruits.  Lungs: clear to auscultation bilaterally with good excursion.  CV: regular rate and rhythm.  Abdomen soft, non-distended, non-tender with normal bowel sounds. No CVAT  Wrist/Hand: Tenderness to palpation-- left wrist at distal aspect of ulna. Range of motion wrist-full in all directions bilaterally . Strength 5/5. 2+ radial pulse. Sensation intact. Good  strength. Finkelstein's test: negative. Pain with extension against resistance: negative.  Lower extremities warm with no edema.    Lab Results   Component Value Date/Time    HBA1C 12.1 07/23/2018 03:23 PM      EKG Interpretation-HR is 82 normal EKG, normal sinus rhythm            Assessment/Plan:     1. Dyslipidemia associated with type 2 diabetes mellitus (HCC) - uncontrolled. REstart medications add Farxiga 5 mg daily. "   Discussed potential side effects of medication with patient.  Monofilament performed by RN.   Referral to DM education.  Glucose monitoring supplies ordered--encouraged home monitoring. To bring in log to next OV in 1 month.   Urine collected today for microalbuminuria assessment.   POCT retinal screening performed in office.  Diabetic Monofilament LE Exam    POCT Hemoglobin A1C    POCT Retinal Eye Exam    COMP METABOLIC PANEL    LIPID PROFILE    metFORMIN (GLUCOPHAGE) 500 MG Tab    glipiZIDE (GLUCOTROL) 5 MG Tab    REFERRAL TO DIABETIC EDUCATION Diabetes Self Management Education / Training (DSME/T) and Medical Nutrition Therapy (MNT): Initial Group DSME/MNT as authorized by payor, Follow-Up DSME/MNT as authorized by payor; DSME/T Content: Monitoring Diabete...    MICROALBUMIN CREAT RATIO URINE    Blood Glucose Monitoring Suppl Device    Blood Glucose Monitoring Suppl Supplies Misc    Lancets Misc   2. Vitamin D deficiency  VITAMIN D,25 HYDROXY   3. Essential hypertension -Resolved. Continue to monitor blood pressure.    4. Obesity (BMI 30-39.9) -Patient's body mass index is 38.97 kg/m². Exercise and nutrition counseling were performed at this visit.    5. Chest pain, unspecified type - EKG normal in office. Reassurance provided. Appears to be most consistent with muscle strain.  - Performed and demonstrated stretches in office with patient. Will do at minimum BID.  - Discussed importance of ice x 20 minutes at least BID to help reduce inflammation  ED precautions: seek emergency evaluation for symptoms including but not limited to : crushing chest pain, chest pain associated with difficulty breathing, nausea, or sweats, heart rate irregular or too fast to count.  EKG   6. Wrist pain, left - Tender in office. CTS wrist splint applied in office. X-ray ordered.  DX-WRIST-COMPLETE 3+ LEFT   7. Thrush - Nystatin swish and swallow for 10 days. Discussed thrush association with DM. Stressed importance of glycemic  control.         DM2 A1c is not at goal     -RN-CDE notes reviewed and discussed.  -Patient's body mass index is 38.97 kg/m². Exercise and nutrition counseling were performed at this visit.   Additionally discussed disease management and weight loss goals.   -Education and advice provided today: See RN-CDE note.  Additional education, advice, refills, and referrals per order    Follow-up:   Return in about 1 month (around 8/23/2018) for follow-up on DM with DM Nurse (DM-RN visit), sooner if needed. sooner should new symptoms or problems arise.    The total time spent seeing the patient in consultation, and formulating an action plan for this visit was 45 minutes.  Greater than 50% of this time was spent counseling, discussing problems documented above, coordinating care and answering questions.

## 2018-07-23 NOTE — PROGRESS NOTES
"RN-CDE Note    Subjective:     Health changes since last visit/interval Hx: has not been taking his medications because he is out of them or just doesn't take them (such as his aspirin) pt does not test his blood sugars at this time. Patient states his lisinopril makes him feel dizzy. Pt c/o itching to toes.    Medications (including changes made today)  Current Outpatient Prescriptions   Medication Sig Dispense Refill   • Dapagliflozin Propanediol (FARXIGA) 5 MG Tab Take 1 Tab by mouth every day. 30 Tab 11   • metFORMIN (GLUCOPHAGE) 500 MG Tab Take 1 Tab by mouth 2 times a day, with meals. 180 Tab 3   • glipiZIDE (GLUCOTROL) 5 MG Tab Take 1 Tab by mouth 2 times daily, before breakfast and dinner. 180 Tab 3   • Blood Glucose Monitoring Suppl Device Dispense:Glucometer covered by patient's insurance 1 Device 0   • Lancets Misc Lancets for glucometer covered by patient's insurance. Sig: use once daily and prn ssx high or low sugar. 100 Each 3   • Blood Glucose Monitoring Suppl SUPPLIES Misc Test strips for glucometer covered by patient's insurance. Sig: use once daily and prn ssx high or low sugar. 100 Each 3   • lisinopril (PRINIVIL) 10 MG Tab Take 1 Tab by mouth every day. (Patient not taking: Reported on 4/25/2017) 30 Tab 3   • aspirin EC 81 MG EC tablet Take 1 Tab by mouth every day. 30 Tab 0   • therapeutic multivitamin-minerals (THERAGRAN-M) Tab Take 1 Tab by mouth every day.       No current facility-administered medications for this visit.        Taking daily ASA: No  Taking above medications as prescribed: no ran out of metformin and glipizide.   SIDE EFFECTS: Patient denies side effects to medications    Exercise: moderate regular exercise, aerobic < 3 days a week  Diet: \"healthy\" diet  in general  Patient's body mass index is 38.97 kg/m². Exercise and nutrition counseling were performed at this visit.      Health Maintenance:   Health Maintenance Due   Topic Date Due   • IMM HEP B VACCINE (1 of 3 - Primary " Series) 1970   • A1C SCREENING  08/28/2017   • FASTING LIPID PROFILE  03/01/2018   • SERUM CREATININE  03/02/2018   • URINE ACR / MICROALBUMIN  04/25/2018   • DIABETES MONOFILAMENT / LE EXAM  04/25/2018   • RETINAL SCREENING  04/26/2018       Immunizations:   PPSV23: Up-to-date  [id number]: Up-to-date  Tdap: Up-to-date  Flu: Up-to-date  Hep B: Up-to-date    DM:   Last A1c:   Lab Results   Component Value Date/Time    HBA1C 12.1 07/23/2018 03:23 PM      A1C GOAL: < 7    Glucose monitoring frequency: not testing  has not been testing  Hypoglycemic episodes: no    Last Retinal Exam: 3 years ago. will have retinal exam done today  Daily Foot Exam: Yes   Routine Dental Exams: No    Lab Results   Component Value Date/Time    MALBCRT 5 06/13/2015 11:06 AM    MICROALBUR 0.7 06/13/2015 11:06 AM        ACR Albumin/Creatinine Ratio goal <30     HTN:   Blood pressure goal <140 <80.   Currently Rx ACE/ARB: No    Dyslipidemia:    Lab Results   Component Value Date/Time    CHOLSTRLTOT 156 03/01/2017 02:53 AM    LDL 67 03/01/2017 02:53 AM    HDL 31 (A) 03/01/2017 02:53 AM    TRIGLYCERIDE 288 (H) 03/01/2017 02:53 AM       Lab Results   Component Value Date/Time    SODIUM 138 03/02/2017 02:52 AM    POTASSIUM 3.9 03/02/2017 02:52 AM    CHLORIDE 104 03/02/2017 02:52 AM    CO2 23 03/02/2017 02:52 AM    GLUCOSE 187 (H) 03/02/2017 02:52 AM    BUN 24 (H) 03/02/2017 02:52 AM    CREATININE 0.84 03/02/2017 02:52 AM     Lab Results   Component Value Date/Time    ALKPHOSPHAT 82 02/28/2017 09:22 AM    ASTSGOT 20 02/28/2017 09:22 AM    ALTSGPT 32 02/28/2017 09:22 AM    TBILIRUBIN 0.6 02/28/2017 09:22 AM        Currently Rx Statin: No    He  reports that he has never smoked. He quit smokeless tobacco use about 8 years ago. His smokeless tobacco use included Chew.    Objective:     Exam:  Monofilament: done   Monofilament testing with a 10 gram force: sensation intact: intact bilaterally  Visual Inspection: Feet without maceration, ulcers,  fissures.  Pedal pulses: intact bilaterally    Plan:     Discussed and educated on:   - All medications, side effects and compliance (discussed carefully)  - Annual eye examinations at Ophthalmology  - Foot Care: what to look for when checking feet every day  - Home glucose monitoring emphasized  - Weight control and daily exercise    Recommended medication changes: It is recommended the patient restart his medications for diabetes.

## 2018-07-23 NOTE — TELEPHONE ENCOUNTER
Phone Number Called: [phone] (home)     Message: Pt states that he can come in at 3 pm to see Peyton as well. Appointment has been updated.    Left Message for patient to call back: no

## 2018-07-24 ENCOUNTER — HOSPITAL ENCOUNTER (OUTPATIENT)
Dept: RADIOLOGY | Facility: MEDICAL CENTER | Age: 48
End: 2018-07-24
Attending: PHYSICIAN ASSISTANT
Payer: COMMERCIAL

## 2018-07-24 DIAGNOSIS — M25.532 WRIST PAIN, LEFT: ICD-10-CM

## 2018-07-24 PROCEDURE — 73110 X-RAY EXAM OF WRIST: CPT | Mod: LT

## 2018-07-25 ENCOUNTER — TELEPHONE (OUTPATIENT)
Dept: MEDICAL GROUP | Age: 48
End: 2018-07-25

## 2018-07-25 ENCOUNTER — PATIENT MESSAGE (OUTPATIENT)
Dept: MEDICAL GROUP | Age: 48
End: 2018-07-25

## 2018-07-25 NOTE — TELEPHONE ENCOUNTER
DOCUMENTATION OF PAR STATUS:    1. Name of Medication & Dose: Dapagliflozin Propanediol (FARXIGA) 5 MG Tab        2. Name of Prescription Coverage Company & phone #:   [facility] #[id number] - [place], [place] - [address]  [address] [place] [address]  Phone: [phone] Fax: [phone]        3. Date Prior Auth Submitted: 07/25/18    4. What information was given to obtain insurance decision? Medical records through [employer] and existing labs    5. Prior Auth Status? Pending   Key number [id number]    6. Patient Notified: Yes- Called and spoke with patient   And let him know a PA was required for his medication and it has been started let him know we will update him on the status of that PA as it progresses.     Pt on Metformin and glipizide with A1c of 12.1%

## 2018-07-27 NOTE — TELEPHONE ENCOUNTER
FINAL PRIOR AUTHORIZATION STATUS:    1.  Name of Medication & Dose: Farxiga 5mg     2. Prior Auth Status: Denied.  Reason: has not met plan coverage criteria for DM medications    3. Action Taken: Pharmacy Notified: yes Patient Notified: N\A

## 2018-08-01 LAB — RETINAL SCREEN: NEGATIVE

## 2018-08-15 NOTE — TELEPHONE ENCOUNTER
DOCUMENTATION OF PAR STATUS:    1. Name of Medication & Dose: Farxiga     2. Name of Prescription Coverage Company & phone #:   [facility] #[id number] - [place], [place] - [address]  [address] [place] [address]  Phone: [phone] Fax: [phone]        3. Date Prior Auth Submitted: 8/15/18- resubmitted     4. What information was given to obtain insurance decision? Metformin an glipizde have not controlled patient A1c levels still at 12.1     5. Prior Auth Status? Pending key # [id number]     6. Patient Notified: yes- LVm for patient to inform we are working on this PA for medication.

## 2018-08-20 ENCOUNTER — TELEPHONE (OUTPATIENT)
Dept: MEDICAL GROUP | Age: 48
End: 2018-08-20

## 2018-08-20 NOTE — TELEPHONE ENCOUNTER
Patient called to let him know why the insurance was not covering Farxiga.  Message left that the medication was switched to Jardiance per insurance request and that a prior authorization was started on the medication.  Instructed to call his pharmacy in about 3 days to see if approved.

## 2018-08-20 NOTE — TELEPHONE ENCOUNTER
FINAL PRIOR AUTHORIZATION STATUS:    1.  Name of Medication & Dose: FARXIGA     2. Prior Auth Status: Denied.  Reason: DENIED - Patient needs to try 3 failed medications. pateint needs to try Invokana or Jardiance prior to trying to start Farxiga. Please send a new rx for either medication. per insurance those medication will require a PA. Indicating on PA that patient has tried and failed metformin and glipiizide will get approved.     3. Action Taken: Pharmacy Notified: yes  Patient Notified: N\A

## 2018-08-23 NOTE — TELEPHONE ENCOUNTER
Phone Number Called: [phone] (home)       Message: Called and spoke to patient informed him of message below.       Left Message for patient to call back: charmaine PINEDA

## 2018-09-10 ENCOUNTER — APPOINTMENT (OUTPATIENT)
Dept: MEDICAL GROUP | Age: 48
End: 2018-09-10
Payer: COMMERCIAL

## 2018-09-13 ENCOUNTER — APPOINTMENT (OUTPATIENT)
Dept: RADIOLOGY | Facility: MEDICAL CENTER | Age: 48
End: 2018-09-13
Attending: EMERGENCY MEDICINE
Payer: COMMERCIAL

## 2018-09-13 ENCOUNTER — HOSPITAL ENCOUNTER (EMERGENCY)
Facility: MEDICAL CENTER | Age: 48
End: 2018-09-13
Attending: EMERGENCY MEDICINE
Payer: COMMERCIAL

## 2018-09-13 VITALS
WEIGHT: 269.4 LBS | HEIGHT: 70 IN | BODY MASS INDEX: 38.57 KG/M2 | SYSTOLIC BLOOD PRESSURE: 126 MMHG | TEMPERATURE: 97.2 F | HEART RATE: 89 BPM | DIASTOLIC BLOOD PRESSURE: 80 MMHG | OXYGEN SATURATION: 95 % | RESPIRATION RATE: 18 BRPM

## 2018-09-13 DIAGNOSIS — R73.9 HYPERGLYCEMIA: ICD-10-CM

## 2018-09-13 DIAGNOSIS — R33.9 URINARY RETENTION: ICD-10-CM

## 2018-09-13 LAB
ALBUMIN SERPL BCP-MCNC: 4.4 G/DL (ref 3.2–4.9)
ALBUMIN/GLOB SERPL: 1.7 G/DL
ALP SERPL-CCNC: 94 U/L (ref 30–99)
ALT SERPL-CCNC: 20 U/L (ref 2–50)
ANION GAP SERPL CALC-SCNC: 10 MMOL/L (ref 0–11.9)
APPEARANCE UR: CLEAR
AST SERPL-CCNC: 15 U/L (ref 12–45)
BASOPHILS # BLD AUTO: 0.6 % (ref 0–1.8)
BASOPHILS # BLD: 0.03 K/UL (ref 0–0.12)
BILIRUB SERPL-MCNC: 0.4 MG/DL (ref 0.1–1.5)
BILIRUB UR QL STRIP.AUTO: NEGATIVE
BUN SERPL-MCNC: 15 MG/DL (ref 8–22)
CALCIUM SERPL-MCNC: 9.8 MG/DL (ref 8.5–10.5)
CHLORIDE SERPL-SCNC: 106 MMOL/L (ref 96–112)
CO2 SERPL-SCNC: 24 MMOL/L (ref 20–33)
COLOR UR: YELLOW
CREAT SERPL-MCNC: 0.75 MG/DL (ref 0.5–1.4)
EOSINOPHIL # BLD AUTO: 0.11 K/UL (ref 0–0.51)
EOSINOPHIL NFR BLD: 2.4 % (ref 0–6.9)
ERYTHROCYTE [DISTWIDTH] IN BLOOD BY AUTOMATED COUNT: 42.4 FL (ref 35.9–50)
GLOBULIN SER CALC-MCNC: 2.6 G/DL (ref 1.9–3.5)
GLUCOSE SERPL-MCNC: 232 MG/DL (ref 65–99)
GLUCOSE UR STRIP.AUTO-MCNC: >=1000 MG/DL
HCT VFR BLD AUTO: 48.3 % (ref 42–52)
HGB BLD-MCNC: 17 G/DL (ref 14–18)
IMM GRANULOCYTES # BLD AUTO: 0.01 K/UL (ref 0–0.11)
IMM GRANULOCYTES NFR BLD AUTO: 0.2 % (ref 0–0.9)
KETONES UR STRIP.AUTO-MCNC: NEGATIVE MG/DL
LEUKOCYTE ESTERASE UR QL STRIP.AUTO: NEGATIVE
LIPASE SERPL-CCNC: 12 U/L (ref 11–82)
LYMPHOCYTES # BLD AUTO: 1.21 K/UL (ref 1–4.8)
LYMPHOCYTES NFR BLD: 26.1 % (ref 22–41)
MCH RBC QN AUTO: 30 PG (ref 27–33)
MCHC RBC AUTO-ENTMCNC: 35.2 G/DL (ref 33.7–35.3)
MCV RBC AUTO: 85.2 FL (ref 81.4–97.8)
MICRO URNS: ABNORMAL
MONOCYTES # BLD AUTO: 0.36 K/UL (ref 0–0.85)
MONOCYTES NFR BLD AUTO: 7.8 % (ref 0–13.4)
NEUTROPHILS # BLD AUTO: 2.92 K/UL (ref 1.82–7.42)
NEUTROPHILS NFR BLD: 62.9 % (ref 44–72)
NITRITE UR QL STRIP.AUTO: NEGATIVE
NRBC # BLD AUTO: 0 K/UL
NRBC BLD-RTO: 0 /100 WBC
PH UR STRIP.AUTO: 7 [PH]
PLATELET # BLD AUTO: 245 K/UL (ref 164–446)
PMV BLD AUTO: 9.7 FL (ref 9–12.9)
POTASSIUM SERPL-SCNC: 4 MMOL/L (ref 3.6–5.5)
PROT SERPL-MCNC: 7 G/DL (ref 6–8.2)
PROT UR QL STRIP: NEGATIVE MG/DL
RBC # BLD AUTO: 5.67 M/UL (ref 4.7–6.1)
RBC UR QL AUTO: NEGATIVE
SODIUM SERPL-SCNC: 140 MMOL/L (ref 135–145)
SP GR UR STRIP.AUTO: 1.02
UROBILINOGEN UR STRIP.AUTO-MCNC: 0.2 MG/DL
WBC # BLD AUTO: 4.6 K/UL (ref 4.8–10.8)

## 2018-09-13 PROCEDURE — 80053 COMPREHEN METABOLIC PANEL: CPT

## 2018-09-13 PROCEDURE — 700117 HCHG RX CONTRAST REV CODE 255: Performed by: EMERGENCY MEDICINE

## 2018-09-13 PROCEDURE — 81003 URINALYSIS AUTO W/O SCOPE: CPT

## 2018-09-13 PROCEDURE — 74177 CT ABD & PELVIS W/CONTRAST: CPT

## 2018-09-13 PROCEDURE — 85025 COMPLETE CBC W/AUTO DIFF WBC: CPT

## 2018-09-13 PROCEDURE — 36415 COLL VENOUS BLD VENIPUNCTURE: CPT

## 2018-09-13 PROCEDURE — 83690 ASSAY OF LIPASE: CPT

## 2018-09-13 PROCEDURE — 99284 EMERGENCY DEPT VISIT MOD MDM: CPT

## 2018-09-13 RX ORDER — TAMSULOSIN HYDROCHLORIDE 0.4 MG/1
0.4 CAPSULE ORAL
Qty: 30 CAP | Refills: 0 | Status: SHIPPED | OUTPATIENT
Start: 2018-09-13 | End: 2019-12-16 | Stop reason: SDUPTHER

## 2018-09-13 RX ADMIN — IOHEXOL 100 ML: 350 INJECTION, SOLUTION INTRAVENOUS at 11:34

## 2018-09-13 NOTE — DISCHARGE INSTRUCTIONS
Acute Urinary Retention, Male  Acute urinary retention is the temporary inability to urinate.  This is a common problem in older men. As men age their prostates become larger and block the flow of urine from the bladder. This is usually a problem that has come on gradually.  Follow these instructions at home:  If you are sent home with a Gonzales catheter and a drainage system, you will need to discuss the best course of action with your health care provider. While the catheter is in, maintain a good intake of fluids. Keep the drainage bag emptied and lower than your catheter. This is so that contaminated urine will not flow back into your bladder, which could lead to a urinary tract infection.  There are two main types of drainage bags. One is a large bag that usually is used at night. It has a good capacity that will allow you to sleep through the night without having to empty it. The second type is called a leg bag. It has a smaller capacity, so it needs to be emptied more frequently. However, the main advantage is that it can be attached by a leg strap and can go underneath your clothing, allowing you the freedom to move about or leave your home.  Only take over-the-counter or prescription medicines for pain, discomfort, or fever as directed by your health care provider.  Contact a health care provider if:  · You develop a low-grade fever.  · You experience spasms or leakage of urine with the spasms.  Get help right away if:  · You develop chills or fever.  · Your catheter stops draining urine.  · Your catheter falls out.  · You start to develop increased bleeding that does not respond to rest and increased fluid intake.  This information is not intended to replace advice given to you by your health care provider. Make sure you discuss any questions you have with your health care provider.  Document Released: 03/26/2002 Document Revised: 05/31/2017 Document Reviewed: 05/29/2014  [employer] Interactive Patient  Education © 2017 [employer]

## 2018-09-13 NOTE — ED PROVIDER NOTES
"ED Provider Note    Scribed for Naren Headley M.D. by Ramiro Feliz. 9/13/2018, 8:32 AM.    Primary care provider: Nelida Bearden P.A.-C.  Means of arrival: Walk-in  History obtained from: Patient  History limited by: None    CHIEF COMPLAINT  Chief Complaint   Patient presents with   • Constipation     \"I haven't had a normal bowel movement in 2 weeks\", reports \"small\" bowel movement this morning        HPI  Arnulfo Cotton is a 48 y.o. Male with a history of diabetes mellitus who presents to the Emergency Department complaining of constipation and urinary retention that began two weeks ago. He last passed a normal bowel movement two weeks ago. He passed small pellet-shaped stools this morning. He has been consuming prune juice Miralax without relief from his constipation. When he urinates he only produces a weak stream and the stream of urine stops frequently. The patient reports associated pain around the area of his prostate. He denies dysuria. He is taking metformin and glipizide for his diabetes mellitus.    REVIEW OF SYSTEMS  Pertinent positives include constipation, urinary retention, hard stools, and prostate pain. Pertinent negatives include no dysuria. As above, all other systems reviewed and are negative.   See HPI for further details.   C    PAST MEDICAL HISTORY   has a past medical history of Type II or unspecified type diabetes mellitus without mention of complication, not stated as uncontrolled.    SURGICAL HISTORY   has a past surgical history that includes other orthopedic surgery.    SOCIAL HISTORY  Social History   Substance Use Topics   • Smoking status: Never Smoker   • Smokeless tobacco: Former User     Types: Chew     Quit date: 1/1/2010      Comment: 25 years   • Alcohol use No      History   Drug Use No       FAMILY HISTORY  Family History   Problem Relation Age of Onset   • Diabetes Mother    • Hypertension Mother    • Diabetes Father    • Cancer Paternal Uncle         lymphoma, " "skin   • Diabetes Maternal Grandmother    • Cancer Maternal Grandfather         stomach   • Diabetes Paternal Grandfather    • Heart Disease Neg Hx    • Stroke Neg Hx        CURRENT MEDICATIONS  No current facility-administered medications on file prior to encounter.      Current Outpatient Prescriptions on File Prior to Encounter   Medication Sig Dispense Refill   • Empagliflozin 25 MG Tab Take 1 Each by mouth every day. 30 Tab 6   • metFORMIN (GLUCOPHAGE) 500 MG Tab Take 1 Tab by mouth 2 times a day, with meals. 180 Tab 3   • glipiZIDE (GLUCOTROL) 5 MG Tab Take 1 Tab by mouth 2 times daily, before breakfast and dinner. 180 Tab 3   • Blood Glucose Monitoring Suppl Device Dispense:Glucometer covered by patient's insurance 1 Device 0   • Blood Glucose Monitoring Suppl Supplies Misc Test strips for glucometer covered by patient's insurance. Sig: use once daily and prn ssx high or low sugar. 100 Each 3   • Lancets Misc Lancets for glucometer covered by patient's insurance. Sig: use once daily and prn ssx high or low sugar. 100 Each 3   • aspirin EC 81 MG EC tablet Take 1 Tab by mouth every day. 30 Tab 0   • therapeutic multivitamin-minerals (THERAGRAN-M) Tab Take 1 Tab by mouth every day.         ALLERGIES  No Known Allergies    PHYSICAL EXAM  VITAL SIGNS: /85   Pulse 84   Temp 36.2 °C (97.2 °F)   Resp 18   Ht 1.778 m (5' 10\")   Wt 122.2 kg (269 lb 6.4 oz)   SpO2 95%   BMI 38.66 kg/m²   Vitals reviewed.  Constitutional: Alert in no apparent distress.  HENT: No signs of trauma, Bilateral external ears normal, Nose normal.   Eyes: Conjunctiva normal, Non-icteric.   Neck: Normal range of motion, No tenderness, Supple, No stridor.   Lymphatic: No lymphadenopathy noted.   Cardiovascular: Regular rate and rhythm, no murmurs.   Thorax & Lungs: Normal breath sounds, No respiratory distress, No wheezing, No chest tenderness.   Abdomen: Bowel sounds normal, Soft, Diffuse tenderness, No peritoneal signs, No masses, " No pulsatile masses.   Skin: Warm, Dry, No erythema, No rash.   Musculoskeletal: Good range of motion in all major joints. No tenderness to palpation or major deformities noted.   Neurologic: Alert , Normal motor function, Normal sensory function, No focal deficits noted.   Psychiatric: Affect normal, Judgment normal, Mood normal.     DIAGNOSTIC STUDIES / PROCEDURES    LABS  Labs Reviewed   CBC WITH DIFFERENTIAL - Abnormal; Notable for the following:        Result Value    WBC 4.6 (*)     All other components within normal limits   COMP METABOLIC PANEL - Abnormal; Notable for the following:     Glucose 232 (*)     All other components within normal limits   URINALYSIS,CULTURE IF INDICATED - Abnormal; Notable for the following:     Glucose >=1000 (*)     All other components within normal limits   LIPASE   ESTIMATED GFR      All labs reviewed by me.    RADIOLOGY  CT-ABDOMEN-PELVIS WITH   Final Result      1.  Mild intra and extrahepatic ductal dilation with the common bile duct measuring up to 14 mm. No definite distal obstructive etiology is identified. If there is strong clinical suspicion for obstruction, MRCP could be obtained for further evaluation.   2.  No acute abnormality of the abdomen or pelvis.        The radiologist's interpretation of all radiological studies have been reviewed by me.    COURSE & MEDICAL DECISION MAKING  Nursing notes, VS, PMSFHx reviewed in chart.  Differential diagnoses include but not limited to: benign prostatic hypertrophy, UTI, bowel obstruction, medication reaction, diverticulitis, constipation, appendicits        8:32 AM Patient seen and examined at bedside. Ordered for CT abdomen pelvis with contrast, estimated GFR, CBC with differential, CMP, Lipase, and U/A culture if indicated to evaluate. He was offered pain medication, but declined.    12:00 PM I reviewed the patient's CT scan. Review of laboratory results reveal a blood glucose of 232.    12:09 PM - Re-examined; The  patient is resting in bed comfortably. I discussed his above findings and plans for discharge with a prescription for Flomax. I asked the patient to follow up with his primary care regarding his elevated blood glucose today. He was given a referral to Urology, his primary care, and instructed to return to the ED if his symptoms worsen. Patient understands and agrees.  Although the patient is not in complete urinary retention he describes weakening of the stream and difficulty urinating.  His CT shows that his bladder is slightly enlarged.  He will be started on Flomax and follow-up with urology.  He has no evidence of urinary tract infection currently.  He was able to urinate in the emergency department.    The patient is referred to his primary care doctor for recheck of his blood sugar, blood pressure screening.    The patient will return for new or worsening symptoms and is stable at the time of discharge.    DISPOSITION:  Patient will be discharged home in stable condition.    FOLLOW UP:  [facility], Emergency Dept  [address] [id number] [phone]    If symptoms worsen    Nelida Bearden P.A.-C.  [address]  [address]  [facility] [id number] [phone]      As needed    Andi Cabello M.D.  [address]  [address]  [facility] [address]  [phone]      call for follow up to be assessed for urinary difficulties      OUTPATIENT MEDICATIONS:  New Prescriptions    TAMSULOSIN (FLOMAX) 0.4 MG CAPSULE    Take 1 Cap by mouth ONE-HALF HOUR AFTER BREAKFAST.         FINAL IMPRESSION  1. Urinary retention    2. Hyperglycemia          Ramiro SOTO (Scribe), am scribing for, and in the presence of, Naren Headley M.D..    Electronically signed by: Ramiro Feliz (Robbiee), 9/13/2018    Naren SOTO M.D. personally performed the services described in this documentation, as scribed by Ramiro Feliz in my presence, and it is both accurate and complete.    The note  accurately reflects work and decisions made by me.  Naren Headley  9/13/2018  12:27 PM

## 2018-09-13 NOTE — ED NOTES
"Pt ambulates to triage with c/c constipation.  Pt states he hasn't had a \"normal\" bowel movement in 2 weeks.  Pt reports \"small\" bowel movement this morning.  Pt thinks this is possibly related to a new medication for type 2 diabetes (unknown name).  A&ox4.  Pt to lobby & advised to inform RN of any changes.   "

## 2018-09-14 ENCOUNTER — PATIENT MESSAGE (OUTPATIENT)
Dept: MEDICAL GROUP | Age: 48
End: 2018-09-14

## 2018-09-14 DIAGNOSIS — E11.69 DYSLIPIDEMIA ASSOCIATED WITH TYPE 2 DIABETES MELLITUS (HCC): ICD-10-CM

## 2018-09-14 DIAGNOSIS — E78.5 DYSLIPIDEMIA ASSOCIATED WITH TYPE 2 DIABETES MELLITUS (HCC): ICD-10-CM

## 2018-10-22 ENCOUNTER — APPOINTMENT (OUTPATIENT)
Dept: MEDICAL GROUP | Age: 48
End: 2018-10-22
Payer: COMMERCIAL

## 2018-11-19 ENCOUNTER — APPOINTMENT (OUTPATIENT)
Dept: MEDICAL GROUP | Age: 48
End: 2018-11-19
Payer: COMMERCIAL

## 2019-09-16 ENCOUNTER — APPOINTMENT (OUTPATIENT)
Dept: MEDICAL GROUP | Age: 49
End: 2019-09-16
Payer: COMMERCIAL

## 2019-09-16 DIAGNOSIS — E78.5 DYSLIPIDEMIA ASSOCIATED WITH TYPE 2 DIABETES MELLITUS (HCC): ICD-10-CM

## 2019-09-16 DIAGNOSIS — E11.69 DYSLIPIDEMIA ASSOCIATED WITH TYPE 2 DIABETES MELLITUS (HCC): ICD-10-CM

## 2019-11-15 ENCOUNTER — OFFICE VISIT (OUTPATIENT)
Dept: URGENT CARE | Facility: CLINIC | Age: 49
End: 2019-11-15
Payer: COMMERCIAL

## 2019-11-15 VITALS
TEMPERATURE: 98.5 F | OXYGEN SATURATION: 95 % | SYSTOLIC BLOOD PRESSURE: 134 MMHG | BODY MASS INDEX: 38.37 KG/M2 | WEIGHT: 268 LBS | RESPIRATION RATE: 16 BRPM | HEIGHT: 70 IN | HEART RATE: 102 BPM | DIASTOLIC BLOOD PRESSURE: 84 MMHG

## 2019-11-15 DIAGNOSIS — L02.91 ABSCESS: ICD-10-CM

## 2019-11-15 DIAGNOSIS — L72.9 SCALP CYST: ICD-10-CM

## 2019-11-15 PROCEDURE — 10060 I&D ABSCESS SIMPLE/SINGLE: CPT | Performed by: NURSE PRACTITIONER

## 2019-11-15 RX ORDER — SULFAMETHOXAZOLE AND TRIMETHOPRIM 800; 160 MG/1; MG/1
2 TABLET ORAL 2 TIMES DAILY
Qty: 20 TAB | Refills: 0 | Status: SHIPPED | OUTPATIENT
Start: 2019-11-15 | End: 2019-11-20

## 2019-11-15 RX ORDER — IBUPROFEN 600 MG/1
600 TABLET ORAL EVERY 6 HOURS PRN
Qty: 30 TAB | Refills: 0 | Status: SHIPPED
Start: 2019-11-15 | End: 2019-12-16

## 2019-11-15 ASSESSMENT — ENCOUNTER SYMPTOMS
VISUAL CHANGE: 0
FEVER: 0
HEADACHES: 1

## 2019-11-15 NOTE — LETTER
November 15, 2019         Patient: Arnulfo Cotton   YOB: 1970   Date of Visit: 11/15/2019           To Whom it May Concern:    Arnulfo Cotton was seen in my clinic on 11/15/2019. He may return to work on 11/16/2019.    If you have any questions or concerns, please don't hesitate to call.        Sincerely,           LILLIAN Thomson  Electronically Signed

## 2019-11-16 NOTE — PROGRESS NOTES
Subjective:     Arnulfo Cotton is a 49 y.o. male who presents for Abscess (big bump on the right side of the head started a week ago)      Cyst to mid head couple of years, and Rt side a couple weeks. Clipped them last Friday while shaving head. No drainage. Slight bleeding. No fever. Touching area causes a headache. Tender to touch. Pulsating pain, sometimes. No other hx cysts or abscess.          Other   This is a new problem. The current episode started 1 to 4 weeks ago. Associated symptoms include headaches. Pertinent negatives include no chills, fever, rash or visual change. He has tried nothing for the symptoms.       Past Medical History:   Diagnosis Date   • Type II or unspecified type diabetes mellitus without mention of complication, not stated as uncontrolled        Past Surgical History:   Procedure Laterality Date   • OTHER ORTHOPEDIC SURGERY      Jaw mass       Social History     Socioeconomic History   • Marital status:      Spouse name: Not on file   • Number of children: Not on file   • Years of education: Not on file   • Highest education level: Not on file   Occupational History   • Not on file   Social Needs   • Financial resource strain: Not on file   • Food insecurity:     Worry: Not on file     Inability: Not on file   • Transportation needs:     Medical: Not on file     Non-medical: Not on file   Tobacco Use   • Smoking status: Never Smoker   • Smokeless tobacco: Never Used   • Tobacco comment: 25 years   Substance and Sexual Activity   • Alcohol use: No     Alcohol/week: 0.0 oz   • Drug use: No   • Sexual activity: Yes     Partners: Female   Lifestyle   • Physical activity:     Days per week: Not on file     Minutes per session: Not on file   • Stress: Not on file   Relationships   • Social connections:     Talks on phone: Not on file     Gets together: Not on file     Attends Zoroastrian service: Not on file     Active member of club or organization: Not on file     Attends meetings  "of clubs or organizations: Not on file     Relationship status: Not on file   • Intimate partner violence:     Fear of current or ex partner: Not on file     Emotionally abused: Not on file     Physically abused: Not on file     Forced sexual activity: Not on file   Other Topics Concern   • Not on file   Social History Narrative   • Not on file        Family History   Problem Relation Age of Onset   • Diabetes Mother    • Hypertension Mother    • Diabetes Father    • Cancer Paternal Uncle         lymphoma, skin   • Diabetes Maternal Grandmother    • Cancer Maternal Grandfather         stomach   • Diabetes Paternal Grandfather    • Heart Disease Neg Hx    • Stroke Neg Hx         No Known Allergies    Review of Systems   Constitutional: Negative for chills and fever.   Skin: Negative for rash.   Neurological: Positive for headaches. Negative for sensory change, speech change and focal weakness.   Endo/Heme/Allergies: Does not bruise/bleed easily.   All other systems reviewed and are negative.       Objective:   /84   Pulse (!) 102   Temp 36.9 °C (98.5 °F)   Resp 16   Ht 1.778 m (5' 10\")   Wt 121.6 kg (268 lb)   SpO2 95%   BMI 38.45 kg/m²     Physical Exam  Vitals signs reviewed.   Constitutional:       General: He is not in acute distress.     Appearance: He is well-developed.   HENT:      Head: Normocephalic and atraumatic.      Right Ear: External ear normal.      Left Ear: External ear normal.      Nose: Nose normal.   Eyes:      Conjunctiva/sclera: Conjunctivae normal.   Neck:      Musculoskeletal: Normal range of motion.   Cardiovascular:      Rate and Rhythm: Normal rate.   Pulmonary:      Effort: Pulmonary effort is normal.   Musculoskeletal: Normal range of motion.   Skin:     General: Skin is warm and dry.      Findings: Abscess, erythema and lesion present. No rash.      Comments: Abscess 1.5x 1.5 cm to right scalp, yellow central scab, fluctuance, surrounding erythema.  Left Dorsal head, yellow " scab, no fluctuance.    Neurological:      Mental Status: He is alert and oriented to person, place, and time.      GCS: GCS eye subscore is 4. GCS verbal subscore is 5. GCS motor subscore is 6.   Psychiatric:         Speech: Speech normal.         Behavior: Behavior normal.         Thought Content: Thought content normal.         Judgment: Judgment normal.         Assessment/Plan:   1. Abscess  - sulfamethoxazole-trimethoprim (BACTRIM DS) 800-160 MG tablet; Take 2 Tabs by mouth 2 times a day for 5 days.  Dispense: 20 Tab; Refill: 0  - ibuprofen (MOTRIN) 600 MG Tab; Take 1 Tab by mouth every 6 hours as needed.  Dispense: 30 Tab; Refill: 0    2. Scalp cyst  - REFERRAL TO DERMATOLOGY  -Warm Compresses.  -OTC tylenol or ibuprofen for pain.  -Wound check in 2 days.  -Follow up with PCP or dermatology for return of cyst, urgently for signs of infection.    Procedure: Incision and Drainage  -Risks, benefits, and alternatives discussed. Risks include infection, bleeding, nerve damage, and poor cosmetic outcome  -Clean technique with sterile instruments  -Local anesthesia with 2% lidocaine  -Incision with #11 blade into fluctuant area with purulent material expressed  -Cavity probed and any loculations bluntly taken down with hemostat  -Irrigated copiously with sterile saline  -Minimal bleeding with good hemostasis achieved  -The patient tolerated the procedure well    The patient is alerted to watch for any signs of infection (redness, pus, pain, increased swelling or fever) and follow up if such occurs. Home wound care instructions are provided. Tetanus vaccination status reviewed: last tetanus booster within 10 years.    Differential diagnosis, natural history, supportive care, and indications for immediate follow-up discussed.

## 2019-11-16 NOTE — PATIENT INSTRUCTIONS
Abscess  Care After  An abscess (also called a boil or furuncle) is an infected area that contains a collection of pus. Signs and symptoms of an abscess include pain, tenderness, redness, or hardness, or you may feel a moveable soft area under your skin. An abscess can occur anywhere in the body. The infection may spread to surrounding tissues causing cellulitis. A cut (incision) by the surgeon was made over your abscess and the pus was drained out. Gauze may have been packed into the space to provide a drain that will allow the cavity to heal from the inside outwards. The boil may be painful for 5 to 7 days. Most people with a boil do not have high fevers. Your abscess, if seen early, may not have localized, and may not have been lanced. If not, another appointment may be required for this if it does not get better on its own or with medications.  HOME CARE INSTRUCTIONS   · Only take over-the-counter or prescription medicines for pain, discomfort, or fever as directed by your caregiver.  · When you bathe, soak and then remove gauze or iodoform packs at least daily or as directed by your caregiver. You may then wash the wound gently with mild soapy water. Repack with gauze or do as your caregiver directs.  SEEK IMMEDIATE MEDICAL CARE IF:   · You develop increased pain, swelling, redness, drainage, or bleeding in the wound site.  · You develop signs of generalized infection including muscle aches, chills, fever, or a general ill feeling.  · An oral temperature above 102° F (38.9° C) develops, not controlled by medication.  See your caregiver for a recheck if you develop any of the symptoms described above. If medications (antibiotics) were prescribed, take them as directed.  Document Released: 07/06/2006 Document Revised: 03/11/2013 Document Reviewed: 03/02/2009  [employer]® Patient Information ©2014 [employer].

## 2019-11-19 ASSESSMENT — ENCOUNTER SYMPTOMS
SENSORY CHANGE: 0
CHILLS: 0
BRUISES/BLEEDS EASILY: 0
FOCAL WEAKNESS: 0
SPEECH CHANGE: 0

## 2019-12-16 ENCOUNTER — TELEPHONE (OUTPATIENT)
Dept: MEDICAL GROUP | Age: 49
End: 2019-12-16

## 2019-12-16 ENCOUNTER — OFFICE VISIT (OUTPATIENT)
Dept: MEDICAL GROUP | Age: 49
End: 2019-12-16

## 2019-12-16 VITALS
HEIGHT: 70 IN | WEIGHT: 274.4 LBS | TEMPERATURE: 97 F | SYSTOLIC BLOOD PRESSURE: 114 MMHG | OXYGEN SATURATION: 96 % | HEART RATE: 98 BPM | DIASTOLIC BLOOD PRESSURE: 80 MMHG | BODY MASS INDEX: 39.28 KG/M2

## 2019-12-16 DIAGNOSIS — E66.9 OBESITY (BMI 30-39.9): ICD-10-CM

## 2019-12-16 DIAGNOSIS — J34.2 DEVIATED SEPTUM: ICD-10-CM

## 2019-12-16 DIAGNOSIS — E11.69 DYSLIPIDEMIA ASSOCIATED WITH TYPE 2 DIABETES MELLITUS (HCC): ICD-10-CM

## 2019-12-16 DIAGNOSIS — E78.5 DYSLIPIDEMIA ASSOCIATED WITH TYPE 2 DIABETES MELLITUS (HCC): ICD-10-CM

## 2019-12-16 DIAGNOSIS — R39.12 WEAK URINARY STREAM: ICD-10-CM

## 2019-12-16 DIAGNOSIS — Z23 NEED FOR VACCINATION: ICD-10-CM

## 2019-12-16 LAB
HBA1C MFR BLD: 12.8 % (ref 0–5.6)
INT CON NEG: NEGATIVE
INT CON POS: POSITIVE

## 2019-12-16 PROCEDURE — 90686 IIV4 VACC NO PRSV 0.5 ML IM: CPT | Performed by: PHYSICIAN ASSISTANT

## 2019-12-16 PROCEDURE — 99214 OFFICE O/P EST MOD 30 MIN: CPT | Mod: 25 | Performed by: PHYSICIAN ASSISTANT

## 2019-12-16 PROCEDURE — 83036 HEMOGLOBIN GLYCOSYLATED A1C: CPT | Performed by: PHYSICIAN ASSISTANT

## 2019-12-16 PROCEDURE — 90471 IMMUNIZATION ADMIN: CPT | Performed by: PHYSICIAN ASSISTANT

## 2019-12-16 RX ORDER — ATORVASTATIN CALCIUM 20 MG/1
20 TABLET, FILM COATED ORAL DAILY
Qty: 90 TAB | Refills: 3 | Status: SHIPPED
Start: 2019-12-16 | End: 2020-10-14

## 2019-12-16 RX ORDER — TAMSULOSIN HYDROCHLORIDE 0.4 MG/1
0.4 CAPSULE ORAL
Qty: 30 CAP | Refills: 0 | Status: SHIPPED | OUTPATIENT
Start: 2019-12-16 | End: 2020-02-18 | Stop reason: SDUPTHER

## 2019-12-16 RX ORDER — GLIPIZIDE 5 MG/1
5 TABLET ORAL
Qty: 180 TAB | Refills: 3 | Status: SHIPPED | OUTPATIENT
Start: 2019-12-16 | End: 2020-12-22 | Stop reason: SDUPTHER

## 2019-12-16 ASSESSMENT — PAIN SCALES - GENERAL: PAINLEVEL: NO PAIN

## 2019-12-16 NOTE — TELEPHONE ENCOUNTER
Please call pharmacy and check if Empagliflozin 25 MG Tab requires a prior authorization.  If so, please start.   Failing metformin and glipizide alone- A1c is 12.8%    If other medication is preferred, please let me know.

## 2019-12-16 NOTE — PROGRESS NOTES
"  Subjective:     Chief Complaint   Patient presents with   • Follow-Up   • Diabetes   • Referral Needed     ENT     Arnulfo Cotton is a 49 y.o. male here today for evaluation and management of:    Dyslipidemia associated with type 2 diabetes mellitus (HCC)/Obesity  Uncontrolled.  Patient reports he has been taking metformin 1000 mg twice daily in addition to glipizide 5 mg twice daily 5 days a week--forgetting his medications in his truck on the weekends  He has not checking his blood sugar.  He denies any hypoglycemic events.  He reports polyuria and polydipsia.  Has some \"itching\" on both palms that does not go away which seems to be consistent with neuropathy.  Denies chest pain, palpitations or shortness of breath.  No claudication.  Diet is poor--a lot of Vick's.  No exercise.  He has been forgetting to take his baby aspirin.    Weak urinary stream  Reports weak urinary stream for over a year.  He was seen in the emergency department September 2018 and was referred to urology and given a prescription for Flomax due to enlarged prostate and urinary symptoms.  He never took the medication nor did he schedule follow-up with his urologist.  Denies painful urination or blood in urine.     Deviated septum  Reports old boxing injury where he was hit in the nose and has had difficulty breathing through his nose symptoms.  He is requesting a referral over to ENT for further evaluation.  He has not tried any nasal sprays.    ROS   Denies any recent fevers or chills. No nausea or vomiting. No diarrhea. No chest pains or shortness of breath. No lower extremity edema.    No Known Allergies    Current medicines (including changes today)  Current Outpatient Medications   Medication Sig Dispense Refill   • metFORMIN (GLUCOPHAGE) 1000 MG tablet Take 1 Tab by mouth 2 times a day. 180 Tab 3   • glipiZIDE (GLUCOTROL) 5 MG Tab Take 1 Tab by mouth 2 times daily, before breakfast and dinner. 180 Tab 3   • tamsulosin (FLOMAX) 0.4 " "MG capsule Take 1 Cap by mouth ONE-HALF HOUR AFTER BREAKFAST. 30 Cap 0   • Empagliflozin 25 MG Tab Take 1 Each by mouth every day. 30 Tab 6   • atorvastatin (LIPITOR) 20 MG Tab Take 1 Tab by mouth every day. 90 Tab 3   • Blood Glucose Monitoring Suppl Device Dispense:Glucometer covered by patient's insurance 1 Device 0   • Blood Glucose Monitoring Suppl Supplies Misc Test strips for glucometer covered by patient's insurance. Sig: use once daily and prn ssx high or low sugar. 100 Each 3   • Lancets Misc Lancets for glucometer covered by patient's insurance. Sig: use once daily and prn ssx high or low sugar. 100 Each 3   • therapeutic multivitamin-minerals (THERAGRAN-M) Tab Take 1 Tab by mouth every day.     • aspirin EC 81 MG EC tablet Take 1 Tab by mouth every day. (Patient not taking: Reported on 12/16/2019) 30 Tab 0     No current facility-administered medications for this visit.        Patient Active Problem List    Diagnosis Date Noted   • Vitamin D deficiency 10/14/2015   • Dyslipidemia 07/05/2015   • Dyslipidemia associated with type 2 diabetes mellitus (HCC) 07/05/2015   • Obesity (BMI 30-39.9) 07/05/2015       Family History   Problem Relation Age of Onset   • Diabetes Mother    • Hypertension Mother    • Diabetes Father    • Cancer Paternal Uncle         lymphoma, skin   • Diabetes Maternal Grandmother    • Cancer Maternal Grandfather         stomach   • Diabetes Paternal Grandfather    • Heart Disease Neg Hx    • Stroke Neg Hx           Objective:     Vitals:    12/16/19 0723   BP: 114/80   BP Location: Left arm   Patient Position: Sitting   BP Cuff Size: Adult long   Pulse: 98   Temp: 36.1 °C (97 °F)   SpO2: 96%   Weight: 124.5 kg (274 lb 6.4 oz)   Height: 1.778 m (5' 10\")     Body mass index is 39.37 kg/m².     Physical Exam:  Gen: Well developed, well nourished in no acute distress. Obese male.  Skin: Pink, warm, and dry  HEENT: conjunctiva non-injected, sclera non-icteric. EOMs intact.   Nasal mucosa " without edema nor erythema. No facial tenderness  Pinna normal. TM pearly gray.   Oral mucous membranes pink and moist with no lesions.  Neck: Supple, trachea midline. No adenopathy or masses in the neck or supraclavicular regions.Carotids without bruits. No JVD.  Lungs: Effort is normal. Clear to auscultation bilaterally with good excursion.  CV: regular rate and rhythm.  Abdomen: soft, nontender, + BS. No HSM.  No CVAT  Ext: no edema, color normal, vascularity normal, temperature normal  Alert and oriented Eye contact is good, speech goal directed, affect calm    Results for orders placed or performed in visit on 12/16/19   POCT  A1C   Result Value Ref Range    Glycohemoglobin 12.8 (A) 0.0 - 5.6 %    Internal Control Negative Negative     Internal Control Positive Positive      Reviewed and discussed above labs with patient in office.      Assessment and Plan:   The following treatment plan was discussed:     1. Dyslipidemia associated with type 2 diabetes mellitus (HCC)  Uncontrolled.  Encouraged to take his medications daily without fail.  He reports he is forgetting to take his meds on the weekends.  Never picked up Jardiance in the past when it was prescribed.  We will try to prescribe it again and if prior authorization is needed we will obtain.  If unable to cover due to cost there are samples at the [facility] pharmacy he can .  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  He will look into this further.  In the interim we will start with Jardiance  -Stressed the importance of dietary modification and physical activity with the goal of blood sugar reduction and weight loss.  -He has glucometer and test strips at home.  Encouraged him to use at least once a day and keep a log.  We will follow-up in 1 month and base our clinical decisions off of these numbers.  -Declines monofilament  "exam today due to big boots on.  Offered diabetes education, however, is not interested at this time.  -Reminded patient to get eye exam.  - metFORMIN (GLUCOPHAGE) 1000 MG tablet; Take 1 Tab by mouth 2 times a day.  Dispense: 180 Tab; Refill: 3  - glipiZIDE (GLUCOTROL) 5 MG Tab; Take 1 Tab by mouth 2 times daily, before breakfast and dinner.  Dispense: 180 Tab; Refill: 3  - Empagliflozin 25 MG Tab; Take 1 Each by mouth every day.  Dispense: 30 Tab; Refill: 6  - atorvastatin (LIPITOR) 20 MG Tab; Take 1 Tab by mouth every day.  Dispense: 90 Tab; Refill: 3  - POCT  A1C    2. Weak urinary stream  Patient had visit last fall in the emergency room and was advised to follow-up with urology and prescribed Flomax.  Patient complains of persistent weakened urinary stream.  I refilled the Flomax and encouraged him to start it to see if it there is any improvement.  Regardless, he will follow-up with urology  - REFERRAL TO UROLOGY  - tamsulosin (FLOMAX) 0.4 MG capsule; Take 1 Cap by mouth ONE-HALF HOUR AFTER BREAKFAST.  Dispense: 30 Cap; Refill: 0    3. Deviated septum  Patient reports old \"boxing\" injury and has had difficulty breathing through his nose since.  Has not tried any nasal sprays and has no desire to do so.  Requesting referral to ENT for further evaluation.  - REFERRAL TO ENT    4. Obesity (BMI 30-39.9)  Strongly encouraged dietary modification and exercise with the goal of weight loss and glycemic control.  - Patient identified as having weight management issue.  Appropriate orders and counseling given.    5. Need for vaccination  VIS given. Informed consent obtained. Vaccine administered in office.  Patient hesitant to receive flu shot today as he \"became very sick and hospitalized\" from the pneumonia shot.  Discussed immune response and appropriate side effects from vaccine.  Reminded him that he has not covered from flu  - Influenza Vaccine Quad Injection (PF)    -Any change or worsening of signs or symptoms, " patient encouraged to follow-up or report to emergency room for further evaluation. Patient verbalizes understanding and agrees.    Followup: Return in about 1 month (around 1/16/2020) for lab review and follow-up on diabetes, sooner if needed.         This dictation was created using voice recognition software. The accuracy of the dictation is limited to the abilities of the software. I expect there may be some errors of grammar and possibly content.

## 2019-12-17 NOTE — TELEPHONE ENCOUNTER
Phone Number Called: [phone] (home)     Call outcome: spoke to patient regarding message below    Message: Pt. Notified and Pt. Understood

## 2019-12-17 NOTE — TELEPHONE ENCOUNTER
Phone Number Called: [phone]    Call outcome: Spoke with nabeel     Message: pharmacy states that no PAR is required and the medication is ready for the patient.

## 2020-02-10 ENCOUNTER — PATIENT MESSAGE (OUTPATIENT)
Dept: HEALTH INFORMATION MANAGEMENT | Facility: OTHER | Age: 50
End: 2020-02-10

## 2020-02-17 ENCOUNTER — PATIENT MESSAGE (OUTPATIENT)
Dept: MEDICAL GROUP | Age: 50
End: 2020-02-17

## 2020-02-17 DIAGNOSIS — R39.12 WEAK URINARY STREAM: ICD-10-CM

## 2020-02-19 RX ORDER — TAMSULOSIN HYDROCHLORIDE 0.4 MG/1
0.4 CAPSULE ORAL
Qty: 90 CAP | Refills: 0 | Status: SHIPPED
Start: 2020-02-19 | End: 2020-06-04

## 2020-03-09 ENCOUNTER — TELEPHONE (OUTPATIENT)
Dept: HEALTH INFORMATION MANAGEMENT | Facility: OTHER | Age: 50
End: 2020-03-09

## 2020-03-09 NOTE — TELEPHONE ENCOUNTER
1. Caller Name: Arnulfo Cotton                        Call Back Number: [phone]  [facility] PCP or Specialty Provider: Yes Nelida May        2.  Does patient have any active symptoms of respiratory illness (fever OR cough OR shortness of breath)? Yes, the patient reports the following respiratory symptoms: cough. Body aches, no fever, chest congestion. Cough mainly upon waking.     3.  In the last 30 days, has the patient traveled outside of the country OR in a high risk area within the [place] OR have any known contact with someone who has?  Yes, [place] cruise 2 weeks ago.     4.  Does patient have any comoribidities? None     5. Disposition:  Advised to perform self care, monitor for worsening symptoms and to call back in 3 days if no improvement.    Note routed to PCP: [facility]WIN only.

## 2020-03-10 ENCOUNTER — OFFICE VISIT (OUTPATIENT)
Dept: URGENT CARE | Facility: CLINIC | Age: 50
End: 2020-03-10

## 2020-03-10 VITALS — TEMPERATURE: 97.5 F | OXYGEN SATURATION: 96 % | HEART RATE: 117 BPM

## 2020-03-10 DIAGNOSIS — J10.1 INFLUENZA B: ICD-10-CM

## 2020-03-10 LAB
FLUAV+FLUBV AG SPEC QL IA: NORMAL
INT CON NEG: NEGATIVE
INT CON POS: POSITIVE

## 2020-03-10 PROCEDURE — 99213 OFFICE O/P EST LOW 20 MIN: CPT | Performed by: PHYSICIAN ASSISTANT

## 2020-03-10 PROCEDURE — 87804 INFLUENZA ASSAY W/OPTIC: CPT | Performed by: PHYSICIAN ASSISTANT

## 2020-03-10 NOTE — LETTER
[facility]  [facility] [place] [address] [facility]  [place] [id number]     March 10, 2020    Patient:    YOB: 1970   Date of Visit: 3/10/2020     To Whom It May Concern:      The patient was seen and treated in our department on 3/10/2020 upper respiratory symptoms.  The patient meets patient under investigation screening criteria for COVID - 19 virus.  Please excuse from work and any other activities for at least 2 weeks unless otherwise told so by [facility] or a licensed professional medical provider    Sincerely,     Max Roblero P.A.-C.

## 2020-03-10 NOTE — PROGRESS NOTES
Subjective:      Arnulfo Cotton is a 50 y.o. male who presents with Cough (congestion , ST, wheezing )             HPI  Patient is a 50-year-old male who presents with chest congestion, sore throat, shortness of breath, body aches, chills, wheezing.  Onset 6 days ago.  He was in [place] on a cruise on 2/26/2020 as well as [place] on February 21.  Returned on March 1.  He has some associated abdominal cramping.  He denies any severe difficulty breathing, wheezing, abdominal pain, nausea, vomiting, diarrhea.  ROS   See HPI     Objective:     Pulse (!) 117   Temp 36.4 °C (97.5 °F) (Temporal)   SpO2 96%      Physical Exam  Vitals signs reviewed.   Constitutional:       General: He is not in acute distress.     Appearance: Normal appearance. He is not ill-appearing, toxic-appearing or diaphoretic.   Eyes:      Conjunctiva/sclera: Conjunctivae normal.   Pulmonary:      Effort: Pulmonary effort is normal. No tachypnea, bradypnea, accessory muscle usage or respiratory distress.   Neurological:      General: No focal deficit present.      Mental Status: He is alert and oriented to person, place, and time.       Past Medical History:   Diagnosis Date   • Type II or unspecified type diabetes mellitus without mention of complication, not stated as uncontrolled       Past Surgical History:   Procedure Laterality Date   • OTHER ORTHOPEDIC SURGERY      Jaw mass      Social History     Socioeconomic History   • Marital status:      Spouse name: Not on file   • Number of children: Not on file   • Years of education: Not on file   • Highest education level: Not on file   Occupational History   • Not on file   Social Needs   • Financial resource strain: Not on file   • Food insecurity     Worry: Not on file     Inability: Not on file   • Transportation needs     Medical: Not on file     Non-medical: Not on file   Tobacco Use   • Smoking status: Never Smoker   • Smokeless tobacco: Never Used   • Tobacco comment: 25 years    Substance and Sexual Activity   • Alcohol use: No     Alcohol/week: 0.0 oz   • Drug use: No   • Sexual activity: Yes     Partners: Female   Lifestyle   • Physical activity     Days per week: Not on file     Minutes per session: Not on file   • Stress: Not on file   Relationships   • Social connections     Talks on phone: Not on file     Gets together: Not on file     Attends Taoism service: Not on file     Active member of club or organization: Not on file     Attends meetings of clubs or organizations: Not on file     Relationship status: Not on file   • Intimate partner violence     Fear of current or ex partner: Not on file     Emotionally abused: Not on file     Physically abused: Not on file     Forced sexual activity: Not on file   Other Topics Concern   • Not on file   Social History Narrative   • Not on file    Patient has no known allergies.            Assessment/Plan:     1. Influenza B  - POCT Influenza A/B - Positive Influenza B    Discussed viral etiology of Influenza.     Symptomatic care.  -Plenty of oral hydration and rest   -Over the counter cough suppressant as directed.  -Tylenol or ibuprofen for pain and fever as directed. In children, avoid Aspirin.   -Saline nasal spray or Mucinex as a decongestant.  -Infection control measures at home. Hand washing, covering sneeze/cough.  -Remain home from work, school, and other populated environments until at least 24 hours after you no longer have a fever.     Discussed associated complications, including risk of pneumonia and ear infections. Follow up with primary care provider. Follow up urgently for worsening symptoms, ear pain or drainage, shortness of breath, abdominal pain, or any other concerns. Follow up emergently for trouble breathing, elevated heart rate, chest pain, signs of dehydration, dizziness, weakness, decreased urine output, confusion, persistent vomiting, severe headache, neck stiffness, persistent high grade fever. Patient  understood and agreed to plan of care.     2:47 PM -spoke with patient on the phone that he has positive influenza B.  Discussed symptomatic care as above.

## 2020-04-16 ENCOUNTER — NON-PROVIDER VISIT (OUTPATIENT)
Dept: URGENT CARE | Facility: CLINIC | Age: 50
End: 2020-04-16

## 2020-04-16 DIAGNOSIS — Z02.1 PRE-EMPLOYMENT DRUG SCREENING: ICD-10-CM

## 2020-04-16 LAB
AMP AMPHETAMINE: NORMAL
COC COCAINE: NORMAL
INT CON NEG: NORMAL
INT CON POS: NORMAL
MET METHAMPHETAMINES: NORMAL
OPI OPIATES: NORMAL
PCP PHENCYCLIDINE: NORMAL
POC DRUG COMMENT 753798-OCCUPATIONAL HEALTH: NEGATIVE
THC: NORMAL

## 2020-04-16 PROCEDURE — 80305 DRUG TEST PRSMV DIR OPT OBS: CPT | Performed by: FAMILY MEDICINE

## 2020-06-04 ENCOUNTER — APPOINTMENT (OUTPATIENT)
Dept: RADIOLOGY | Facility: MEDICAL CENTER | Age: 50
End: 2020-06-04
Attending: EMERGENCY MEDICINE
Payer: COMMERCIAL

## 2020-06-04 ENCOUNTER — HOSPITAL ENCOUNTER (EMERGENCY)
Facility: MEDICAL CENTER | Age: 50
End: 2020-06-04
Attending: EMERGENCY MEDICINE
Payer: COMMERCIAL

## 2020-06-04 ENCOUNTER — TELEPHONE (OUTPATIENT)
Dept: MEDICAL GROUP | Age: 50
End: 2020-06-04

## 2020-06-04 VITALS
RESPIRATION RATE: 20 BRPM | SYSTOLIC BLOOD PRESSURE: 130 MMHG | DIASTOLIC BLOOD PRESSURE: 79 MMHG | BODY MASS INDEX: 38.19 KG/M2 | OXYGEN SATURATION: 91 % | WEIGHT: 266.76 LBS | HEIGHT: 70 IN | TEMPERATURE: 97.5 F | HEART RATE: 84 BPM

## 2020-06-04 DIAGNOSIS — R59.0 MEDIASTINAL LYMPHADENOPATHY: ICD-10-CM

## 2020-06-04 DIAGNOSIS — R42 DISEQUILIBRIUM: ICD-10-CM

## 2020-06-04 DIAGNOSIS — R51.9 SEVERE HEADACHE: ICD-10-CM

## 2020-06-04 DIAGNOSIS — R59.0 HILAR LYMPHADENOPATHY: ICD-10-CM

## 2020-06-04 LAB
ALBUMIN SERPL BCP-MCNC: 4.2 G/DL (ref 3.2–4.9)
ALBUMIN/GLOB SERPL: 1.5 G/DL
ALP SERPL-CCNC: 116 U/L (ref 30–99)
ALT SERPL-CCNC: 26 U/L (ref 2–50)
ANION GAP SERPL CALC-SCNC: 17 MMOL/L (ref 7–16)
APTT PPP: 30.5 SEC (ref 24.7–36)
AST SERPL-CCNC: 21 U/L (ref 12–45)
BASOPHILS # BLD AUTO: 0.5 % (ref 0–1.8)
BASOPHILS # BLD: 0.03 K/UL (ref 0–0.12)
BILIRUB SERPL-MCNC: 0.7 MG/DL (ref 0.1–1.5)
BUN SERPL-MCNC: 13 MG/DL (ref 8–22)
CALCIUM SERPL-MCNC: 9.9 MG/DL (ref 8.4–10.2)
CHLORIDE SERPL-SCNC: 103 MMOL/L (ref 96–112)
CO2 SERPL-SCNC: 21 MMOL/L (ref 20–33)
CREAT SERPL-MCNC: 0.74 MG/DL (ref 0.5–1.4)
EKG IMPRESSION: NORMAL
EOSINOPHIL # BLD AUTO: 0.19 K/UL (ref 0–0.51)
EOSINOPHIL NFR BLD: 3 % (ref 0–6.9)
ERYTHROCYTE [DISTWIDTH] IN BLOOD BY AUTOMATED COUNT: 41.5 FL (ref 35.9–50)
GLOBULIN SER CALC-MCNC: 2.8 G/DL (ref 1.9–3.5)
GLUCOSE SERPL-MCNC: 348 MG/DL (ref 65–99)
HCT VFR BLD AUTO: 48 % (ref 42–52)
HGB BLD-MCNC: 16.5 G/DL (ref 14–18)
IMM GRANULOCYTES # BLD AUTO: 0.01 K/UL (ref 0–0.11)
IMM GRANULOCYTES NFR BLD AUTO: 0.2 % (ref 0–0.9)
INR PPP: 0.97 (ref 0.87–1.13)
LYMPHOCYTES # BLD AUTO: 1.14 K/UL (ref 1–4.8)
LYMPHOCYTES NFR BLD: 18.1 % (ref 22–41)
MCH RBC QN AUTO: 29.2 PG (ref 27–33)
MCHC RBC AUTO-ENTMCNC: 34.4 G/DL (ref 33.7–35.3)
MCV RBC AUTO: 85 FL (ref 81.4–97.8)
MONOCYTES # BLD AUTO: 0.55 K/UL (ref 0–0.85)
MONOCYTES NFR BLD AUTO: 8.7 % (ref 0–13.4)
NEUTROPHILS # BLD AUTO: 4.39 K/UL (ref 1.82–7.42)
NEUTROPHILS NFR BLD: 69.5 % (ref 44–72)
NRBC # BLD AUTO: 0 K/UL
NRBC BLD-RTO: 0 /100 WBC
PLATELET # BLD AUTO: 250 K/UL (ref 164–446)
PMV BLD AUTO: 10 FL (ref 9–12.9)
POTASSIUM SERPL-SCNC: 4.1 MMOL/L (ref 3.6–5.5)
PROT SERPL-MCNC: 7 G/DL (ref 6–8.2)
PROTHROMBIN TIME: 13 SEC (ref 12–14.6)
RBC # BLD AUTO: 5.65 M/UL (ref 4.7–6.1)
SODIUM SERPL-SCNC: 141 MMOL/L (ref 135–145)
TROPONIN T SERPL-MCNC: 9 NG/L (ref 6–19)
WBC # BLD AUTO: 6.3 K/UL (ref 4.8–10.8)

## 2020-06-04 PROCEDURE — 80053 COMPREHEN METABOLIC PANEL: CPT

## 2020-06-04 PROCEDURE — 700117 HCHG RX CONTRAST REV CODE 255: Performed by: EMERGENCY MEDICINE

## 2020-06-04 PROCEDURE — 96375 TX/PRO/DX INJ NEW DRUG ADDON: CPT

## 2020-06-04 PROCEDURE — 36415 COLL VENOUS BLD VENIPUNCTURE: CPT

## 2020-06-04 PROCEDURE — 71045 X-RAY EXAM CHEST 1 VIEW: CPT

## 2020-06-04 PROCEDURE — 70496 CT ANGIOGRAPHY HEAD: CPT

## 2020-06-04 PROCEDURE — 94760 N-INVAS EAR/PLS OXIMETRY 1: CPT

## 2020-06-04 PROCEDURE — 84484 ASSAY OF TROPONIN QUANT: CPT

## 2020-06-04 PROCEDURE — 85025 COMPLETE CBC W/AUTO DIFF WBC: CPT

## 2020-06-04 PROCEDURE — 93005 ELECTROCARDIOGRAM TRACING: CPT

## 2020-06-04 PROCEDURE — 93005 ELECTROCARDIOGRAM TRACING: CPT | Performed by: EMERGENCY MEDICINE

## 2020-06-04 PROCEDURE — 99285 EMERGENCY DEPT VISIT HI MDM: CPT

## 2020-06-04 PROCEDURE — 96374 THER/PROPH/DIAG INJ IV PUSH: CPT

## 2020-06-04 PROCEDURE — 70498 CT ANGIOGRAPHY NECK: CPT

## 2020-06-04 PROCEDURE — 85610 PROTHROMBIN TIME: CPT

## 2020-06-04 PROCEDURE — 700111 HCHG RX REV CODE 636 W/ 250 OVERRIDE (IP): Performed by: EMERGENCY MEDICINE

## 2020-06-04 PROCEDURE — 85730 THROMBOPLASTIN TIME PARTIAL: CPT

## 2020-06-04 RX ORDER — PROCHLORPERAZINE EDISYLATE 5 MG/ML
10 INJECTION INTRAMUSCULAR; INTRAVENOUS ONCE
Status: COMPLETED | OUTPATIENT
Start: 2020-06-04 | End: 2020-06-04

## 2020-06-04 RX ORDER — ATORVASTATIN CALCIUM 20 MG/1
TABLET, FILM COATED ORAL
Status: SHIPPED | COMMUNITY
Start: 2020-04-16 | End: 2020-06-04

## 2020-06-04 RX ORDER — NAPROXEN 500 MG/1
500 TABLET ORAL 2 TIMES DAILY WITH MEALS
Qty: 20 TAB | Refills: 0 | Status: SHIPPED | OUTPATIENT
Start: 2020-06-04 | End: 2020-06-09

## 2020-06-04 RX ORDER — TAMSULOSIN HYDROCHLORIDE 0.4 MG/1
0.4 CAPSULE ORAL DAILY
Status: ON HOLD | COMMUNITY
End: 2020-09-10

## 2020-06-04 RX ORDER — DIPHENHYDRAMINE HYDROCHLORIDE 50 MG/ML
25 INJECTION INTRAMUSCULAR; INTRAVENOUS ONCE
Status: COMPLETED | OUTPATIENT
Start: 2020-06-04 | End: 2020-06-04

## 2020-06-04 RX ADMIN — IOHEXOL 80 ML: 350 INJECTION, SOLUTION INTRAVENOUS at 13:43

## 2020-06-04 RX ADMIN — DIPHENHYDRAMINE HYDROCHLORIDE 25 MG: 50 INJECTION, SOLUTION INTRAMUSCULAR; INTRAVENOUS at 13:18

## 2020-06-04 RX ADMIN — PROCHLORPERAZINE EDISYLATE 10 MG: 5 INJECTION INTRAMUSCULAR; INTRAVENOUS at 13:15

## 2020-06-04 ASSESSMENT — FIBROSIS 4 INDEX: FIB4 SCORE: 0.66

## 2020-06-04 NOTE — ED NOTES
Plan of care reviewed with pt-med for 6/10 h/a after iv with blood draw and pcx. In no apparent distress, lights off for comfort, family at bedside

## 2020-06-04 NOTE — ED TRIAGE NOTES
"Pt presents complaining of dizziness, headache, and transitory nausea recurring intermittently for approximately a month.  He denies any respiratory \"issues.\"    Pt denies any domestic high risk areas, or international travel within the past 14 days.   Chief Complaint   Patient presents with   • Headache   • Dizziness   • Nausea     /92   Pulse 100   Temp 36.4 °C (97.5 °F) (Temporal)   Resp 20   Ht 1.778 m (5' 10\")   Wt 121 kg (266 lb 12.1 oz)   SpO2 96%   BMI 38.28 kg/m²     "

## 2020-06-04 NOTE — ED NOTES
All discharge instructions given to pt as well as Rx for naproxen.  Pt advised not to drink or drive.  Pt verbalized understanding of all discharge instructions. All lines removed prior to discharge. All questions answered.

## 2020-06-04 NOTE — ED NOTES
Pt rounding upon return from ct-states improvement in previous pain 2/10 and nausea-none. Vs as charted, still with photophobia. Call light in reach

## 2020-06-04 NOTE — ED PROVIDER NOTES
ED Provider Note    ED Provider    Means of arrival: Private vehicle  History obtained from: Patient  History limited by: None    CHIEF COMPLAINT  Chief Complaint   Patient presents with   • Headache   • Dizziness   • Nausea   • Light Sensitivity       HPI  Arnulfo Cotton is a 50 y.o. male who presents with complaints of a headache x1 month.  It is isolated more to the right posterior parietal area, it is sharp and tender to touch but he states that the inside feels like a pounding sensation.  The pain is constant and sometimes becomes very severe.  He does get photophobia with it nausea and vomiting with it also.  He has no history of headaches.  In this area he did have an abscess drained back in November and since that he is getting intermittent pain there.    He is also complaining of trouble walking.  When he is walking he tends to go to the left and feels off balance/dizzy.  He is also noticed that he has some trouble holding things with his left hand also.    No specific weakness or numbness to the extremities.    No history of trauma, no recent fevers chills cough congestion neck pain or abdominal pain.    REVIEW OF SYSTEMS  See HPI for further details. All other systems are negative.     PAST MEDICAL HISTORY   has a past medical history of Type II or unspecified type diabetes mellitus without mention of complication, not stated as uncontrolled.    SOCIAL HISTORY  Social History     Tobacco Use   • Smoking status: Never Smoker   • Smokeless tobacco: Never Used   Substance and Sexual Activity   • Alcohol use: No     Alcohol/week: 0.0 oz   • Drug use: No   • Sexual activity: Yes     Partners: Female       SURGICAL HISTORY   has a past surgical history that includes other orthopedic surgery.    CURRENT MEDICATIONS  Home Medications     Reviewed by Mary Beth Dillard (Pharmacy Tech) on 06/04/20 at 1331  Med List Status: Complete   Medication Last Dose Status   atorvastatin (LIPITOR) 20 MG Tab 6/3/2020  "Active   Empagliflozin 25 MG Tab 6/3/2020 Active   glipiZIDE (GLUCOTROL) 5 MG Tab 6/3/2020 Active   metFORMIN (GLUCOPHAGE) 1000 MG tablet 6/3/2020 Active   tamsulosin (FLOMAX) 0.4 MG capsule 6/3/2020 Active   therapeutic multivitamin-minerals (THERAGRAN-M) Tab 6/3/2020 Active                ALLERGIES  No Known Allergies    PHYSICAL EXAM  VITAL SIGNS: /79   Pulse 84   Temp 36.4 °C (97.5 °F) (Temporal)   Resp 20   Ht 1.778 m (5' 10\")   Wt 121 kg (266 lb 12.1 oz)   SpO2 91%   BMI 38.28 kg/m²   Constitutional: Alert in no apparent distress.  HENT: No signs of trauma, Mucous membranes are moist   Eyes:  Conjunctiva normal, Non-icteric.   Neck: Normal range of motion, No tenderness, Supple,  Lymphatic: No lymphadenopathy noted.   Cardiovascular: Regular rate and rhythm, no murmurs.   Thorax & Lungs: Normal breath sounds, No respiratory distress, No wheezing, No chest tenderness.   Abdomen: Bowel sounds normal, Soft, No tenderness, No masses, No pulsatile masses. No peritoneal signs.  Skin: Warm, Dry,Normal color  Back: No bony tenderness, No CVA tenderness.   Extremities:No edema, No tenderness, No cyanosis,    Musculoskeletal: Good range of motion in all major joints. No tenderness to palpation or major deformities noted.   Neurologic: Alert ,Oriented x4, mild left lower extremity weakness compared to right but he is able to hold it against gravity for 10 seconds, Normal sensory function, there is just minimal coordination deficit of the left upper and left lower extremity  Psychiatric: Affect normal, Judgment normal, Mood normal.       MEDICAL DECISION MAKING  This is a 50 y.o. male who presents with a headache, photophobia, nausea vomiting, and neurological problems.  This may be a new onset of migraines with complex migraine symptoms concerns for stroke and hemorrhagic stroke are considered CT will be done for evaluation to rule out for stroke, he was medicated for headache pain    DIAGNOSTIC STUDIES / " PROCEDURES    EKG  Results for orders placed or performed during the hospital encounter of 06/04/20   CBC WITH DIFFERENTIAL   Result Value Ref Range    WBC 6.3 4.8 - 10.8 K/uL    RBC 5.65 4.70 - 6.10 M/uL    Hemoglobin 16.5 14.0 - 18.0 g/dL    Hematocrit 48.0 42.0 - 52.0 %    MCV 85.0 81.4 - 97.8 fL    MCH 29.2 27.0 - 33.0 pg    MCHC 34.4 33.7 - 35.3 g/dL    RDW 41.5 35.9 - 50.0 fL    Platelet Count 250 164 - 446 K/uL    MPV 10.0 9.0 - 12.9 fL    Neutrophils-Polys 69.50 44.00 - 72.00 %    Lymphocytes 18.10 (L) 22.00 - 41.00 %    Monocytes 8.70 0.00 - 13.40 %    Eosinophils 3.00 0.00 - 6.90 %    Basophils 0.50 0.00 - 1.80 %    Immature Granulocytes 0.20 0.00 - 0.90 %    Nucleated RBC 0.00 /100 WBC    Neutrophils (Absolute) 4.39 1.82 - 7.42 K/uL    Lymphs (Absolute) 1.14 1.00 - 4.80 K/uL    Monos (Absolute) 0.55 0.00 - 0.85 K/uL    Eos (Absolute) 0.19 0.00 - 0.51 K/uL    Baso (Absolute) 0.03 0.00 - 0.12 K/uL    Immature Granulocytes (abs) 0.01 0.00 - 0.11 K/uL    NRBC (Absolute) 0.00 K/uL   COMP METABOLIC PANEL   Result Value Ref Range    Sodium 141 135 - 145 mmol/L    Potassium 4.1 3.6 - 5.5 mmol/L    Chloride 103 96 - 112 mmol/L    Co2 21 20 - 33 mmol/L    Anion Gap 17.0 (H) 7.0 - 16.0    Glucose 348 (H) 65 - 99 mg/dL    Bun 13 8 - 22 mg/dL    Creatinine 0.74 0.50 - 1.40 mg/dL    Calcium 9.9 8.4 - 10.2 mg/dL    AST(SGOT) 21 12 - 45 U/L    ALT(SGPT) 26 2 - 50 U/L    Alkaline Phosphatase 116 (H) 30 - 99 U/L    Total Bilirubin 0.7 0.1 - 1.5 mg/dL    Albumin 4.2 3.2 - 4.9 g/dL    Total Protein 7.0 6.0 - 8.2 g/dL    Globulin 2.8 1.9 - 3.5 g/dL    A-G Ratio 1.5 g/dL   PROTHROMBIN TIME   Result Value Ref Range    PT 13.0 12.0 - 14.6 sec    INR 0.97 0.87 - 1.13   APTT   Result Value Ref Range    APTT 30.5 24.7 - 36.0 sec   TROPONIN   Result Value Ref Range    Troponin T 9 6 - 19 ng/L   ESTIMATED GFR   Result Value Ref Range    GFR If African American >60 >60 mL/min/1.73 m 2    GFR If Non African American >60 >60 mL/min/1.73  m 2   EKG   Result Value Ref Range    Report       [facility] Emergency Dept.    Test Date:  2020  Pt Name:    HCRISTIAN KAM               Department: [facility]  MRN:        [id number]                      Room:       -ROOM 7  Gender:     Male                         Technician: [id number]  :        1970                   Requested By:ER TRIAGE PROTOCOL  Order #:    [id number]                    Reading MD: CHERRIE DON D.O.    Measurements  Intervals                                Axis  Rate:       100                          P:          60  ID:         [id number]                          QRS:        36  QRSD:       77                           T:          17  QT:         339  QTc:        438    Interpretive Statements  Sinus tachycardia  Probable left atrial enlargement  Compared to ECG 2017 00:19:46  Sinus rhythm no longer present  Electronically Signed On 2020 15:23:53 PDT by CHERRIE DON D.O.           LABS      RADIOLOGY  CT-CTA NECK WITH & W/O-POST PROCESSING   Final Result      CT angiogram of the neck notable only for mild soft plaque centered at the carotid bulbs. No stenosis, aneurysm, or occlusion      Extensive mediastinal and right hilar lymphadenopathy is highly worrisome for malignancy such as lymphoma favored over metastases or sarcoidosis      Findings were discussed with CHERRIE DON on 2020 2:17 PM.      CT-CTA HEAD WITH & W/O-POST PROCESS   Final Result      CT angiogram of the [place] within normal limits.      DX-CHEST-PORTABLE (1 VIEW)   Final Result      Mild bibasilar atelectasis or scarring.      Radiological films were independently visualized by me    COURSE  Pertinent Labs & Imaging studies reviewed. (See chart for details)    12:57 PM - Patient seen and examined at bedside. Discussed plan of care,     Patient presented with headache and neurological complaints.  Concerns for stroke and bleed are present.  CT shows no mass  no mass-effect, no bleed, and no signs of stroke or circulatory obstruction.    He was medicated with Compazine and Benadryl and this did almost completely resolve his headache, Toradol did resolve with rest away.    I did receive a call from the discussed the CT read with the radiologist who reported mediastinal lymphadenopathy which is concerning for lymphoma versus metastasis.    I spoke with the patient concerning this, the need for follow-up MRI of the brain, along with follow-up cancer evaluation for possible lymphoma.  Attempts were made to call would be made who is the patient's primary provider she is not available at this time.  I did speak with the scheduling office and made an appointment with this patient for the ninth of this month so that he can get rapid follow-up and evaluation.  He is prescribed pain medication for his headache.          FINAL IMPRESSION  1. Severe headache    2. Disequilibrium    3. Mediastinal lymphadenopathy        The note accurately reflects work and decisions made by me.  Vickey Leary D.O.  6/4/2020  3:43 PM

## 2020-06-04 NOTE — DISCHARGE INSTRUCTIONS
I can find no specific cause for headache, you are being prescribed a medication to assist with the discomfort.  This will require further evaluation to include probably an MRI for your off balance walking.    Your blood sugar was elevated today, please continue your blood sugar medications as prescribed and monitor your blood sugar.    Your CT shows enlarged lymph nodes in your chest, this is concerning for lymphoma or other forms of cancer.  This require further evaluation.    Nelida Olson was not available today.  I did make an appointment for you for the night so that appropriate testing can be done.  She will be able to access the ER record and CT scan results.

## 2020-06-05 NOTE — TELEPHONE ENCOUNTER
Called and spoke with patient and wife regarding today's ED visit and CT findings.   Referral to [facility] for further work-up.

## 2020-06-09 ENCOUNTER — APPOINTMENT (RX ONLY)
Dept: URBAN - METROPOLITAN AREA CLINIC 31 | Facility: CLINIC | Age: 50
Setting detail: DERMATOLOGY
End: 2020-06-09

## 2020-06-09 ENCOUNTER — OFFICE VISIT (OUTPATIENT)
Dept: MEDICAL GROUP | Age: 50
End: 2020-06-09
Payer: COMMERCIAL

## 2020-06-09 VITALS
WEIGHT: 266.4 LBS | TEMPERATURE: 96.7 F | SYSTOLIC BLOOD PRESSURE: 128 MMHG | BODY MASS INDEX: 38.14 KG/M2 | DIASTOLIC BLOOD PRESSURE: 80 MMHG | OXYGEN SATURATION: 91 % | HEART RATE: 95 BPM | HEIGHT: 70 IN

## 2020-06-09 DIAGNOSIS — G44.229 CHRONIC TENSION-TYPE HEADACHE, NOT INTRACTABLE: ICD-10-CM

## 2020-06-09 DIAGNOSIS — E11.69 DYSLIPIDEMIA ASSOCIATED WITH TYPE 2 DIABETES MELLITUS (HCC): ICD-10-CM

## 2020-06-09 DIAGNOSIS — L90.5 SCAR CONDITIONS AND FIBROSIS OF SKIN: ICD-10-CM

## 2020-06-09 DIAGNOSIS — E78.5 DYSLIPIDEMIA ASSOCIATED WITH TYPE 2 DIABETES MELLITUS (HCC): ICD-10-CM

## 2020-06-09 DIAGNOSIS — Z00.00 ENCOUNTER FOR GENERAL ADULT MEDICAL EXAMINATION WITHOUT ABNORMAL FINDINGS: ICD-10-CM

## 2020-06-09 DIAGNOSIS — R59.0 HILAR LYMPHADENOPATHY: ICD-10-CM

## 2020-06-09 DIAGNOSIS — R59.0 MEDIASTINAL LYMPHADENOPATHY: ICD-10-CM

## 2020-06-09 PROBLEM — D48.5 NEOPLASM OF UNCERTAIN BEHAVIOR OF SKIN: Status: ACTIVE | Noted: 2020-06-09

## 2020-06-09 PROBLEM — Z71.1 PERSON WITH FEARED HEALTH COMPLAINT IN WHOM NO DIAGNOSIS IS MADE: Status: ACTIVE | Noted: 2020-06-09

## 2020-06-09 LAB
HBA1C MFR BLD: 12.4 % (ref 0–5.6)
INT CON NEG: NEGATIVE
INT CON POS: POSITIVE

## 2020-06-09 PROCEDURE — 11102 TANGNTL BX SKIN SINGLE LES: CPT

## 2020-06-09 PROCEDURE — 99214 OFFICE O/P EST MOD 30 MIN: CPT | Performed by: PHYSICIAN ASSISTANT

## 2020-06-09 PROCEDURE — ? BIOPSY BY SHAVE METHOD

## 2020-06-09 PROCEDURE — 99202 OFFICE O/P NEW SF 15 MIN: CPT | Mod: 25

## 2020-06-09 PROCEDURE — 83036 HEMOGLOBIN GLYCOSYLATED A1C: CPT | Performed by: PHYSICIAN ASSISTANT

## 2020-06-09 PROCEDURE — ? COUNSELING

## 2020-06-09 RX ORDER — TAMSULOSIN HYDROCHLORIDE 0.4 MG/1
CAPSULE ORAL
COMMUNITY
Start: 2020-04-16 | End: 2020-06-09

## 2020-06-09 RX ORDER — PEN NEEDLE, DIABETIC 31 GX5/16"
NEEDLE, DISPOSABLE MISCELLANEOUS
Qty: 90 EACH | Refills: 11 | Status: SHIPPED
Start: 2020-06-09 | End: 2020-08-28

## 2020-06-09 RX ORDER — NAPROXEN 500 MG/1
TABLET ORAL
COMMUNITY
Start: 2020-06-04 | End: 2020-06-09

## 2020-06-09 RX ORDER — INSULIN GLARGINE 100 [IU]/ML
10 INJECTION, SOLUTION SUBCUTANEOUS EVERY EVENING
Qty: 5 PEN | Refills: 11 | Status: SHIPPED
Start: 2020-06-09 | End: 2020-08-28

## 2020-06-09 ASSESSMENT — LOCATION SIMPLE DESCRIPTION DERM: LOCATION SIMPLE: SCALP

## 2020-06-09 ASSESSMENT — LOCATION ZONE DERM: LOCATION ZONE: SCALP

## 2020-06-09 ASSESSMENT — PATIENT HEALTH QUESTIONNAIRE - PHQ9: CLINICAL INTERPRETATION OF PHQ2 SCORE: 0

## 2020-06-09 ASSESSMENT — FIBROSIS 4 INDEX: FIB4 SCORE: 0.79

## 2020-06-09 ASSESSMENT — LOCATION DETAILED DESCRIPTION DERM: LOCATION DETAILED: RIGHT CENTRAL PARIETAL SCALP

## 2020-06-09 NOTE — HPI: SKIN LESION
Is This A New Presentation, Or A Follow-Up?: Growth
What Type Of Note Output Would You Prefer (Optional)?: Standard Output
How Severe Is Your Skin Lesion?: mild
Has Your Skin Lesion Been Treated?: not been treated
Which Family Member (Optional)?: Paternal uncle

## 2020-06-09 NOTE — PROCEDURE: BIOPSY BY SHAVE METHOD

## 2020-06-11 NOTE — PROGRESS NOTES
Subjective:     Chief Complaint   Patient presents with   • Follow-Up     chest x-ray   • Headache     on and off since NOV 2019   • Other     swollen lymph nodes     Arnulfo Cotton is a 50 y.o. male here today for evaluation and management of:    Dyslipidemia associated with type 2 diabetes mellitus (HCC)  Uncontrolled. Reports that he is Currently taking metformin 1000 mg BID, Jardiance 25 mg daily, and glipizide 5 mg BID as directed.  Denies side effects or hypoglycemic events.  Denies side effects--no myalgias or abdominal pain.  He is not monitoring blood sugar regularly at home.  Reports diet is poor--very poor.  He is not exercising regularly.  Last eye exam: 7/2018-- no retinopathy.   Denies polyuria,  blurred vision, or neuropathies. + polydipsia  Heis not taking ASA daily.  He denies dizziness, claudication, or chest pain.    Chronic tension-type headache, not intractable/ Mediastinal lymphadenopathy/Hilar lymphadenopathy  New problem.  Recent ED visit for chronic, persistent headaches.   Reports he went to  11/15/2019 an had scalp cyst removed. Has had headache in that area of his scalp since. Was also referred to derm at that time and has appointment this afternoon to follow-up. Some scalp tenderness. No bumps or cysts noted since.    ED visit was given medications to abort headache temporarily. Reports had head and neck CT scans and lymphadenopathy was discovered. He has appointment [facility] 6/15/2020.  Denies any fever, chills, or unexplained weight loss.   DM is very poorly controlled. Diet is very poor. Drinking some water.   Treated for Influenza B in March when he was a PUI for COVID-19.       ROS   Denies any recent fevers or chills. No nausea or vomiting. No diarrhea. No chest pains or shortness of breath. No lower extremity edema.    No Known Allergies    Current medicines (including changes today)  Current Outpatient Medications   Medication Sig Dispense Refill   • insulin glargine  "(INSULIN GLARGINE) 100 UNIT/ML Solution Pen-injector injection Inject 10 Units as instructed every evening. 5 PEN 11   • Insulin Pen Needle (B-D ULTRAFINE III SHORT PEN) Use as directed with insulin pen 90 Each 11   • tamsulosin (FLOMAX) 0.4 MG capsule Take 0.4 mg by mouth every evening.     • metFORMIN (GLUCOPHAGE) 1000 MG tablet Take 1 Tab by mouth 2 times a day. 180 Tab 3   • glipiZIDE (GLUCOTROL) 5 MG Tab Take 1 Tab by mouth 2 times daily, before breakfast and dinner. 180 Tab 3   • Empagliflozin 25 MG Tab Take 1 Each by mouth every day. 30 Tab 6   • atorvastatin (LIPITOR) 20 MG Tab Take 1 Tab by mouth every day. 90 Tab 3   • therapeutic multivitamin-minerals (THERAGRAN-M) Tab Take 1 Tab by mouth every day.       No current facility-administered medications for this visit.        Patient Active Problem List    Diagnosis Date Noted   • Vitamin D deficiency 10/14/2015   • Dyslipidemia 07/05/2015   • Dyslipidemia associated with type 2 diabetes mellitus (HCC) 07/05/2015   • Obesity (BMI 30-39.9) 07/05/2015       Family History   Problem Relation Age of Onset   • Diabetes Mother    • Hypertension Mother    • Diabetes Father    • Cancer Paternal Uncle         lymphoma, skin   • Diabetes Maternal Grandmother    • Cancer Maternal Grandfather         stomach   • Diabetes Paternal Grandfather    • Heart Disease Neg Hx    • Stroke Neg Hx           Objective:     Vitals:    06/09/20 1013   BP: 128/80   BP Location: Left arm   Patient Position: Sitting   BP Cuff Size: Adult   Pulse: 95   Temp: 35.9 °C (96.7 °F)   TempSrc: Temporal   SpO2: 91%   Weight: 120.8 kg (266 lb 6.4 oz)   Height: 1.778 m (5' 10\")     Body mass index is 38.22 kg/m².     Physical Exam:  Gen: Well developed, well nourished in no acute distress. Obese male.   Skin: Pink, warm, and dry  HEENT: conjunctiva non-injected, sclera non-icteric. EOMs intact.   Nasal mucosa without edema nor erythema. No facial tenderness  Pinna normal. TM pearly gray.   Oral " mucous membranes pink and moist with no lesions.  Neck: Supple, trachea midline. Unable to palpate adenopathy or masses in the neck or supraclavicular regions.  Lungs: Effort is normal. Clear to auscultation bilaterally with good excursion.  CV: regular rate and rhythm.  Abdomen: soft, nontender, + BS.   Ext: no edema, color normal, vascularity normal, temperature normal  Alert and oriented Eye contact is good, speech goal directed, affect calm    Lab Results   Component Value Date/Time    HBA1C 12.4 (A) 06/09/2020 10:53 AM    HBA1C 12.8 (A) 12/16/2019 07:30 AM    HBA1C 12.1 07/23/2018 03:23 PM    Reviewed and discussed above labs with patient in office.      Assessment and Plan:   The following treatment plan was discussed:     1. Dyslipidemia associated with type 2 diabetes mellitus (HCC)  Uncontrolled. Heavily discussed long term effects of uncontrolled blood sugar. 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 Will start with 10 units at night and increase by 2 units every 3-4 days until FBS is approx 150.  Check blood glucose multiple times daily and log. Bring to next visit.   Declines foot exam today. Will complete labs   - POCT  A1C  - insulin glargine (INSULIN GLARGINE) 100 UNIT/ML Solution Pen-injector injection; Inject 10 Units as instructed every evening.  Dispense: 5 PEN; Refill: 11  - Insulin Pen Needle (B-D ULTRAFINE III SHORT PEN); Use as directed with insulin pen  Dispense: 90 Each; Refill: 11    2. Mediastinal lymphadenopathy  3. Hilar lymphadenopathy  - New problem. Reviewed ED visit from 6/4/2020 and CTs of head and neck 6/4/2020.   - Referral placed to [facility]-- they have contacted patient and he has appointment on 6/15/2020.    4. Chronic tension-type headache, not intractable  Stable though not fully resolved. Advised first step of getting glucose under control and work-up by [facility]. OTC analgesics OK.  -Any change or worsening of signs  or symptoms, patient encouraged to follow-up or report to emergency room for further evaluation. Patient verbalizes understanding and agrees.    Health Maintenance: Labs to be done prior to next visit.     Followup: Return in about 2 weeks (around 6/23/2020) for follow-up on DM-- labs and glucose log, sooner if needed.

## 2020-06-15 ENCOUNTER — OFFICE VISIT (OUTPATIENT)
Dept: HEMATOLOGY ONCOLOGY | Facility: MEDICAL CENTER | Age: 50
End: 2020-06-15
Payer: COMMERCIAL

## 2020-06-15 VITALS
BODY MASS INDEX: 37.7 KG/M2 | SYSTOLIC BLOOD PRESSURE: 126 MMHG | HEIGHT: 70 IN | TEMPERATURE: 98.2 F | WEIGHT: 263.34 LBS | HEART RATE: 96 BPM | OXYGEN SATURATION: 95 % | DIASTOLIC BLOOD PRESSURE: 70 MMHG | RESPIRATION RATE: 20 BRPM

## 2020-06-15 DIAGNOSIS — R59.0 MEDIASTINAL ADENOPATHY: ICD-10-CM

## 2020-06-15 DIAGNOSIS — R59.0 HILAR ADENOPATHY: ICD-10-CM

## 2020-06-15 DIAGNOSIS — R53.83 OTHER FATIGUE: ICD-10-CM

## 2020-06-15 DIAGNOSIS — R06.02 SOB (SHORTNESS OF BREATH): ICD-10-CM

## 2020-06-15 PROCEDURE — 99204 OFFICE O/P NEW MOD 45 MIN: CPT | Performed by: NURSE PRACTITIONER

## 2020-06-15 RX ORDER — INSULIN GLARGINE 100 [IU]/ML
INJECTION, SOLUTION SUBCUTANEOUS
COMMUNITY
Start: 2020-06-09 | End: 2020-08-28

## 2020-06-15 ASSESSMENT — ENCOUNTER SYMPTOMS
WHEEZING: 0
COUGH: 0
SPUTUM PRODUCTION: 1
SHORTNESS OF BREATH: 1
HEMOPTYSIS: 0

## 2020-06-15 ASSESSMENT — FIBROSIS 4 INDEX: FIB4 SCORE: 0.79

## 2020-06-15 ASSESSMENT — PAIN SCALES - GENERAL: PAINLEVEL: 4=SLIGHT-MODERATE PAIN

## 2020-06-15 NOTE — PROGRESS NOTES
Subjective:      Arnulfo Cotton is a 50 y.o. male who presents as a New Patient for lymphadenopathy.          HPI    Patient referred to me, Intake Oncology Coordinator by his PCP Nelida Bearden PA-C for lymphadenopathy.  Patient is accompanied by his wife for today's visit.    Patient has been experiencing significant chronic persistent headaches which warranted a visit to the emergency department in June 2020.  Patient stated that in November 2019 he had a cyst removed on his scalp and stated since that procedure he has had a headache in the area of the procedure.  He also complained of some scalp tenderness.  However the headaches had become more severe with associated nausea, vomiting and light sensitivity.  She also had complained of balance issues and feeling dizzy at times.  Therefore patient was seen in the emergency department on June 4, 2020.  ER physician had ordered a CT of the head due to possible concerns for stroke.  CT showed no mass-effect, no bleeding and no signs of stroke or any circulatory obstruction.  Due to the concern for possible stroke he also had a CTA completed of the neck.  CTA of the neck shows extensive mediastinal and right hilar lymphadenopathy highly worrisome for malignancy such as lymphoma.  Reading radiologist stated that lymphoma is favored over metastasis or sarcoidosis.  In the ER visit patient was given medication including a Toradol injection as well as Compazine and Benadryl in had almost complete resolution of his headaches at that time.  Patient continues to state that he has a residual underlying headache but is not as severe as it was when he went to the ER.    I did personally review the CT reports and images in detail and reviewed the CTA of the neck in detail with the patient and his wife today.    Patient does have fatigue which is quite debilitating.  He denies fevers chills, weight loss or night sweats.  As mentioned above he has photophobia, dizziness,  balance issues as well as nausea and vomiting that is associated during his headache episodes.  He denies congestion sore throat, blurred vision or double vision, coughing, hemoptysis.  He does have shortness of breath with activity and at rest along with some sputum production.  He also states maybe every once in a while he may notice a very mild wheezing.  He denies chest pain or heart palpitations or swelling in his legs.  He denies any diarrhea or constipation and is having normal bowel movements per his normal routine.  He is voiding that difficulty denies any pain, rashes or itching.    Please see past medical and surgical history below.    Patient does have a family history of cancer in a paternal uncle who had melanoma.  Maternal grandfather with stomach cancer.  And he did state that he has multiple maternal aunts who have had cancer, however type are unknown.  She also mentioned that his sister was diagnosed with sarcoidosis approximately 10 years ago.    Patient is a never smoker.  He did previously use chewing tobacco for a few years.    Family History   Problem Relation Age of Onset   • Diabetes Mother    • Hypertension Mother    • Diabetes Father    • Cancer Paternal Uncle         melanoma   • Diabetes Maternal Grandmother    • Cancer Maternal Grandfather         stomach   • Diabetes Paternal Grandfather    • Cancer Maternal Aunt         cancer unknown   • Heart Disease Neg Hx    • Stroke Neg Hx      Social History     Socioeconomic History   • Marital status:      Spouse name: Not on file   • Number of children: Not on file   • Years of education: Not on file   • Highest education level: Not on file   Occupational History   • Not on file   Social Needs   • Financial resource strain: Not on file   • Food insecurity     Worry: Not on file     Inability: Not on file   • Transportation needs     Medical: Not on file     Non-medical: Not on file   Tobacco Use   • Smoking status: Never Smoker   •  Smokeless tobacco: Former User     Types: Chew   Substance and Sexual Activity   • Alcohol use: No     Alcohol/week: 0.0 oz   • Drug use: No   • Sexual activity: Yes     Partners: Female   Lifestyle   • Physical activity     Days per week: Not on file     Minutes per session: Not on file   • Stress: Not on file   Relationships   • Social connections     Talks on phone: Not on file     Gets together: Not on file     Attends L[employer] day service: Not on file     Active member of club or organization: Not on file     Attends meetings of clubs or organizations: Not on file     Relationship status: Not on file   • Intimate partner violence     Fear of current or ex partner: Not on file     Emotionally abused: Not on file     Physically abused: Not on file     Forced sexual activity: Not on file   Other Topics Concern   • Not on file   Social History Narrative   • Not on file         Review of Systems   Constitutional: Positive for malaise/fatigue. Negative for chills, diaphoresis, fever and weight loss.   HENT: Negative for congestion and sore throat.    Eyes: Positive for photophobia (associated with HAs). Negative for blurred vision and double vision.   Respiratory: Positive for sputum production and shortness of breath (with activity and rest). Negative for cough, hemoptysis and wheezing (very minimally).    Cardiovascular: Negative for chest pain, palpitations and leg swelling.   Gastrointestinal: Positive for nausea and vomiting. Negative for constipation and diarrhea.        N/V associated with HAs   Genitourinary: Negative for dysuria.   Musculoskeletal: Negative for myalgias.   Skin: Negative for itching and rash.   Neurological: Positive for dizziness (with HAs and off balance) and headaches.   Psychiatric/Behavioral: Negative for depression. The patient is not nervous/anxious and does not have insomnia.           Objective:     /70 (BP Location: Right arm, Patient Position: Sitting, BP Cuff Size: Adult)    "Pulse 96   Temp 36.8 °C (98.2 °F) (Temporal)   Resp 20   Ht 1.778 m (5' 10\")   Wt 119.4 kg (263 lb 5.4 oz)   SpO2 95%   BMI 37.79 kg/m²      Physical Exam  Vitals signs reviewed.   Constitutional:       General: He is not in acute distress.     Appearance: Normal appearance. He is well-developed. He is not diaphoretic.   HENT:      Head: Normocephalic and atraumatic.      Mouth/Throat:      Mouth: Mucous membranes are moist.      Pharynx: Oropharynx is clear. No oropharyngeal exudate.   Eyes:      General: No scleral icterus.        Right eye: No discharge.         Left eye: No discharge.      Conjunctiva/sclera: Conjunctivae normal.      Pupils: Pupils are equal, round, and reactive to light.   Neck:      Musculoskeletal: Normal range of motion and neck supple. No muscular tenderness.      Thyroid: No thyromegaly.   Cardiovascular:      Rate and Rhythm: Normal rate and regular rhythm.      Pulses: Normal pulses.      Heart sounds: Normal heart sounds. No murmur. No friction rub. No gallop.    Pulmonary:      Effort: Pulmonary effort is normal. No respiratory distress.      Breath sounds: Normal breath sounds. No wheezing.   Abdominal:      General: Bowel sounds are normal. There is no distension.      Palpations: Abdomen is soft.      Tenderness: There is no abdominal tenderness.   Musculoskeletal: Normal range of motion.         General: No tenderness.   Lymphadenopathy:      Head:      Right side of head: No submental, submandibular, tonsillar, preauricular, posterior auricular or occipital adenopathy.      Left side of head: No submental, submandibular, tonsillar, preauricular, posterior auricular or occipital adenopathy.      Cervical: No cervical adenopathy.      Right cervical: No superficial, deep or posterior cervical adenopathy.     Left cervical: No superficial, deep or posterior cervical adenopathy.      Upper Body:      Right upper body: No supraclavicular or axillary adenopathy.      Left upper " body: No supraclavicular or axillary adenopathy.      Lower Body: No right inguinal adenopathy. No left inguinal adenopathy.   Skin:     General: Skin is warm and dry.      Coloration: Skin is not pale.      Findings: No erythema or rash.   Neurological:      Mental Status: He is alert and oriented to person, place, and time.   Psychiatric:         Mood and Affect: Mood normal.         Behavior: Behavior normal.         Ct-cta Head With & W/o-post Process    Result Date: 6/4/2020 6/4/2020 1:32 PM HISTORY/REASON FOR EXAM:  Headache and nausea for a month. Dizziness. TECHNIQUE/EXAM DESCRIPTION: CT angiogram of the [place] without and with contrast.  Initial precontrast images were obtained of the head from the skull base through the vertex.  Postcontrast images were obtained of the [place] following the power injection of nonionic contrast at 5.0 mL/sec. Thin-section helical images were obtained with overlapping reconstruction interval. Coronal and sagittal multiplanar volume reformats were generated.  3D angiographic images were reviewed on PACS.  Maximum intensity projection (MIP) images were generated and reviewed. 80 mL of Omnipaque 350 nonionic contrast was injected intravenously. Low dose optimization technique was utilized for this CT exam including automated exposure control and adjustment of the mA and/or kV according to patient size. COMPARISON:  None. FINDINGS: The posterior circulation shows the distal vertebral arteries to be patent. The vertebrobasilar confluence is intact. The basilar artery is patent. No aneurysm or occlusive lesion is evident. The anterior circulation shows no stenotic or occlusive lesion. No aneurysm is evident about the [place]. The brain is unremarkable. 3D angiographic/MIP images of the vasculature confirm the vascular findings as described above.     CT angiogram of the [place] within normal limits.    Ct-cta Neck With & W/o-post  Processing    Result Date: 6/4/2020 6/4/2020 1:32 PM HISTORY/REASON FOR EXAM:  Neuro deficit, acute, stroke suspected; stroke protocol TECHNIQUE/EXAM DESCRIPTION: CT angiogram of the neck with contrast. Postcontrast images were obtained of the neck from the great vessels through the skull base following the power injection of nonionic contrast at 5.0 mL/sec. Thin-section helical images were obtained with overlapping reconstruction interval. Coronal and oblique multiplanar volume reformats were generated. Cervical internal carotid artery percent stenosis is calculated using the standard method according to the NASCET criteria wherein a segment of uniform caliber mid or distal cervical internal carotid is used as the reference denominator. 3D angiographic images were reviewed on PACS.  Maximum intensity projection (MIP) images were generated and reviewed 80 mL of Omnipaque 350 nonionic contrast was injected intravenously. Low dose optimization technique was utilized for this CT exam including automated exposure control and adjustment of the mA and/or kV according to patient size. COMPARISON:  None. FINDINGS: The arch origins of the great vessels appear intact. The aortic arch shows no abnormality. There is minimal soft plaque centered at the carotid bulbs. The right common carotid artery, cervical carotid bifurcation, and cervical internal carotid artery are otherwise within normal limits. There is no evidence of significant stenosis, thrombus, or other filling defect or ulceration. There is no evidence of  dissection or aneurysm. The left common carotid artery, cervical carotid bifurcation, and cervical internal carotid artery are otherwise within normal limits. There is no evidence of significant stenosis, thrombus, or other filling defect or ulceration. There is no evidence of dissection or aneurysm. The cervical vertebral arteries are normal bilaterally. The neck soft tissues and lung apices in the field of view  are abnormal with extensive mediastinal lymphadenopathy. There is a 17 mm short axis node anterior to the right mainstem bronchus. There are aortopulmonary window nodes measuring up to 16 mm short axis. There is an increased number of enlarged nodes. Right hilar yusra tissue measures at least 21 mm short axis There are no enlarged cervical nodes but there is an increased number of normal-sized nodes 3D angiographic/MIP images of the vasculature confirm the vascular findings as described above.     CT angiogram of the neck notable only for mild soft plaque centered at the carotid bulbs. No stenosis, aneurysm, or occlusion Extensive mediastinal and right hilar lymphadenopathy is highly worrisome for malignancy such as lymphoma favored over metastases or sarcoidosis Findings were discussed with CHERRIE DON on 6/4/2020 2:17 PM.       Assessment/Plan:       1. Mediastinal adenopathy  [id number] BASE TO MID-THIGH   2. Hilar adenopathy  [id number] BASE TO MID-THIGH   3. Other fatigue  [id number] BASE TO MID-THIGH   4. SOB (shortness of breath)  [id number] BASE TO MID-THIGH       Plan  1.  Patient with significant fatigue and shortness of breath at rest and with activity.  He has had severe headaches that have lasted dating back to November 2019.  Headaches have progressively worsened which caused photophobia, dizziness, balance issues, nausea and vomiting.  Recent ER visit due to severe headache, work-up completed which showed a negative CT of the head.  However CT of the neck did show extensive mediastinal and right hilar lymphadenopathy which is concerning for malignancy such as lymphoma.  Reading radiologist stated that lymphoma is favored over a metastasis or sarcoidosis.    Based on the finding of the CT neck there is high concern for malignancy due to the extensive mediastinal and right hilar adenopathy.  I will go ahead and proceed with a PET/CT for further evaluation.  Discussed with patient  the need for biopsy in the future.  Patient and wife did verbalize understanding and are in agreement with the plan to proceed with PET/CT.  Have patient follow-up with me in the clinic to review the results of the PET/CT and discuss further plan of care at that time.      Please note that this dictation was created using voice recognition software. I have made every reasonable attempt to correct obvious errors, but I expect that there are errors of grammar and possibly content that I did not discover before finalizing the note.

## 2020-06-16 ENCOUNTER — TELEPHONE (OUTPATIENT)
Dept: HEMATOLOGY ONCOLOGY | Facility: MEDICAL CENTER | Age: 50
End: 2020-06-16

## 2020-06-16 ASSESSMENT — ENCOUNTER SYMPTOMS
NERVOUS/ANXIOUS: 0
FEVER: 0
DIAPHORESIS: 0
DIARRHEA: 0
CONSTIPATION: 0
PHOTOPHOBIA: 1
HEADACHES: 1
WEIGHT LOSS: 0
DIZZINESS: 1
CHILLS: 0
DOUBLE VISION: 0
BLURRED VISION: 0
INSOMNIA: 0
DEPRESSION: 0
NAUSEA: 1
MYALGIAS: 0
PALPITATIONS: 0
VOMITING: 1
SORE THROAT: 0

## 2020-06-16 NOTE — TELEPHONE ENCOUNTER
Phone Number Called: [phone]    Call outcome: Did not leave a detailed message. Requested patient to call back.    Message: Left message for patient to return call in regards to PET Scan and follow up appointment being moved up sooner.    PET Scan  Date: Wednesday 06/24/2020  Time: Check in 1:00 pm  Location: [address] [place] [address]  Prepartation: Diet begins Tuesday 06/23/2020 at 1:30 pm    Follow up with YOGESH Anderson on Wednesday 06/24/2020 at 4:30 pm for results.    Please relay this information to patient when he returns our call.     [employer] message has been sent to patient as well.

## 2020-06-23 ENCOUNTER — OFFICE VISIT (OUTPATIENT)
Dept: MEDICAL GROUP | Age: 50
End: 2020-06-23
Payer: COMMERCIAL

## 2020-06-23 VITALS
DIASTOLIC BLOOD PRESSURE: 64 MMHG | TEMPERATURE: 97.9 F | OXYGEN SATURATION: 96 % | HEART RATE: 96 BPM | WEIGHT: 260.8 LBS | BODY MASS INDEX: 37.34 KG/M2 | SYSTOLIC BLOOD PRESSURE: 120 MMHG | HEIGHT: 70 IN

## 2020-06-23 DIAGNOSIS — E11.69 DYSLIPIDEMIA ASSOCIATED WITH TYPE 2 DIABETES MELLITUS (HCC): ICD-10-CM

## 2020-06-23 DIAGNOSIS — E78.5 DYSLIPIDEMIA ASSOCIATED WITH TYPE 2 DIABETES MELLITUS (HCC): ICD-10-CM

## 2020-06-23 DIAGNOSIS — R59.0 HILAR LYMPHADENOPATHY: ICD-10-CM

## 2020-06-23 DIAGNOSIS — R74.8 ELEVATED LIVER ENZYMES: ICD-10-CM

## 2020-06-23 DIAGNOSIS — E66.9 OBESITY (BMI 30-39.9): ICD-10-CM

## 2020-06-23 DIAGNOSIS — G44.229 CHRONIC TENSION-TYPE HEADACHE, NOT INTRACTABLE: ICD-10-CM

## 2020-06-23 DIAGNOSIS — R06.02 SHORTNESS OF BREATH: ICD-10-CM

## 2020-06-23 DIAGNOSIS — R59.0 MEDIASTINAL LYMPHADENOPATHY: ICD-10-CM

## 2020-06-23 PROCEDURE — 99214 OFFICE O/P EST MOD 30 MIN: CPT | Performed by: PHYSICIAN ASSISTANT

## 2020-06-23 SDOH — HEALTH STABILITY: MENTAL HEALTH: HOW OFTEN DO YOU HAVE 6 OR MORE DRINKS ON ONE OCCASION?: NEVER

## 2020-06-23 SDOH — HEALTH STABILITY: MENTAL HEALTH: HOW OFTEN DO YOU HAVE A DRINK CONTAINING ALCOHOL?: NEVER

## 2020-06-23 ASSESSMENT — FIBROSIS 4 INDEX: FIB4 SCORE: 0.79

## 2020-06-23 NOTE — PROGRESS NOTES
Subjective:     Chief Complaint   Patient presents with   • Diabetes Follow-up   • Breathing Problem     Pateint states that his breathing is becoming more labored.    • Lymphadenopathy     Swollen. ONC. ordered a PET scan that will be done tomorrow     Arnulfo Cotton is a 50 y.o. male here today for evaluation and management of:    Dyslipidemia associated with type 2 diabetes mellitus/Obesity  Uncontrolled. Recently has taken all of his meds as prescribed  Denies side effects--slight diarrhea to start but has now improved. Denies hypoglycemic events.  Denies side effects--no myalgias or abdominal pain.  He is monitoring blood sugar regularly at home under 200.  Reports diet is improving--he is trying. Still going out for icecream weekly.   He is not exercising regularly.  Last eye exam: 7/23/18-- no retinopathy.  Denies polyuria, polydipsia, blurred vision. Mild tingling in his fingers  He denies dizziness, claudication, or chest pain.    Mediastinal lymphadenopathy/ Hilar lymphadenopathy/shortness of breath  Under care of intake oncology coordinator. Has upcoming scan and follow-up with her for further information.  Reports he has been more short of breath and having a hard time performing at work.  Denies difficulty breathing associated with anxiety. Does not think he is anxious. Wife noticing occasionally at night time and has to calm him down.     Chronic tension-type headache, not intractable  Headache persists but has not worsened.   Taking OTCs to help.   No TIA or stroke-like symptoms,  no facial/extremity paraesthesia, no amaurosis or  diplopia, no speech or swallowing difficulty, no focal or persistent numbness or weakness, no stumbling while walking, gait changes, or dizziness    Elevated Liver Enzymes  No abdominal pain. No change in coloring of skin or stools.   PET scan scheduled.    ROS   Denies any recent fevers or chills. No unexplained weight loss. No nausea or vomiting. No chest pains. No  lower extremity edema.    No Known Allergies    Current medicines (including changes today)  Current Outpatient Medications   Medication Sig Dispense Refill   • tamsulosin (FLOMAX) 0.4 MG capsule Take 0.4 mg by mouth every evening.     • metFORMIN (GLUCOPHAGE) 1000 MG tablet Take 1 Tab by mouth 2 times a day. 180 Tab 3   • glipiZIDE (GLUCOTROL) 5 MG Tab Take 1 Tab by mouth 2 times daily, before breakfast and dinner. 180 Tab 3   • Empagliflozin 25 MG Tab Take 1 Each by mouth every day. 30 Tab 6   • atorvastatin (LIPITOR) 20 MG Tab Take 1 Tab by mouth every day. 90 Tab 3   • therapeutic multivitamin-minerals (THERAGRAN-M) Tab Take 1 Tab by mouth every day.     • INSULIN GLARGINE 100 UNIT/ML injection PEN      • insulin glargine (INSULIN GLARGINE) 100 UNIT/ML Solution Pen-injector injection Inject 10 Units as instructed every evening. (Patient not taking: Reported on 6/23/2020) 5 PEN 11   • Insulin Pen Needle (B-D ULTRAFINE III SHORT PEN) Use as directed with insulin pen (Patient not taking: Reported on 6/23/2020) 90 Each 11     No current facility-administered medications for this visit.        Patient Active Problem List    Diagnosis Date Noted   • Vitamin D deficiency 10/14/2015   • Dyslipidemia 07/05/2015   • Dyslipidemia associated with type 2 diabetes mellitus (HCC) 07/05/2015   • Obesity (BMI 30-39.9) 07/05/2015       Family History   Problem Relation Age of Onset   • Diabetes Mother    • Hypertension Mother    • Diabetes Father    • Cancer Paternal Uncle         melanoma   • Diabetes Maternal Grandmother    • Cancer Maternal Grandfather         stomach   • Diabetes Paternal Grandfather    • Cancer Maternal Aunt         cancer unknown   • Heart Disease Neg Hx    • Stroke Neg Hx           Objective:     Vitals:    06/23/20 0742   BP: 120/64   BP Location: Left arm   Patient Position: Sitting   BP Cuff Size: Adult long   Pulse: 96   Temp: 36.6 °C (97.9 °F)   TempSrc: Temporal   SpO2: 96%   Weight: 118.3 kg (260 lb  "12.8 oz)   Height: 1.778 m (5' 10\")     Body mass index is 37.42 kg/m².     Physical Exam:  Gen: Well developed, well nourished in no acute distress. Obese male.   Skin: Pink, warm, and dry  HEENT: conjunctiva non-injected, sclera non-icteric. EOMs intact.   Nasal mucosa without edema nor erythema. No facial tenderness  Pinna normal. TM pearly gray.   Oral mucous membranes pink and moist with no lesions.  Neck: Supple, trachea midline. No adenopathy or masses in the neck or supraclavicular regions.  Lungs: Effort is normal. Clear to auscultation bilaterally with good excursion.  CV: regular rate and rhythm.  Abdomen: soft, nontender, + BS. No HSM.  No CVAT  Monofilament testing with a 10 gram force: sensation intact: intact bilaterally  Visual Inspection: Feet without maceration, ulcers, fissures.  Pedal pulses: intact bilaterally  Ext: no edema, color normal, vascularity normal, temperature normal  Alert and oriented Eye contact is good, speech goal directed, affect calm    Reviewed [facility] labs 6/16/2020 (scanned into media):    Microalbumin/Cr ratio 8  Total Cholesterol: 154  HDL: 35  Triglycerides: 110  LDL: 98  Glucose: 254  ALT: 138  AST: 49  Lab Results   Component Value Date/Time    HBA1C 12.4 (A) 06/09/2020 10:53 AM    HBA1C 12.8 (A) 12/16/2019 07:30 AM    HBA1C 12.1 07/23/2018 03:23 PM        Reviewed and discussed above labs with patient in office.      Assessment and Plan:   The following treatment plan was discussed:    1. Dyslipidemia associated with type 2 diabetes mellitus (HCC)  2. Obesity (BMI 30-39.9)  Not adequately controlled but improving. Continue medications daily with routine checking of glucose. Will bring in logs next visit.   Encouraged weight loss and dietary modifications.   Not tolerating physical exertion right now but advised dietary modifications will help drastically.   Recommended he complete eye exam. Foot exam performed today--stressed importance of daily foot checks.    3. " Mediastinal lymphadenopathy  4. Hilar lymphadenopathy  Stable. Currently undergoing work-up by IOC. PET scan scheduled.     5. Chronic tension-type headache, not intractable  Stable. Encouraged neck stretching and increase water intake. Stressed importance of glycemic control.    6. Shortness of breath  New problem. Needs work note--provided. PET scheduled.    7. Elevated liver enzymes  New problem. No ETOH. Will see what PET shows.     -Any change or worsening of signs or symptoms, patient encouraged to follow-up or report to emergency room for further evaluation. Patient verbalizes understanding and agrees.      Followup: Return in about 1 month (around 7/23/2020) for follow-up on diabetes and IOC work-up.

## 2020-06-23 NOTE — LETTER
June 23, 2020       Patient: Arnulfo Cotton   YOB: 1970   Date of Visit: 6/23/2020         To Whom It May Concern:    In my medical opinion, I recommend that Arnulfo Cotton be allowed reasonable accommodations for alternative work responsibilities due to his shortness of breath.    If you have any questions or concerns, please don't hesitate to call [phone].          Sincerely,          Nelida Bearden P.A.-C.  Electronically Signed

## 2020-06-24 ENCOUNTER — HOSPITAL ENCOUNTER (OUTPATIENT)
Dept: RADIOLOGY | Facility: MEDICAL CENTER | Age: 50
End: 2020-06-24
Attending: NURSE PRACTITIONER
Payer: COMMERCIAL

## 2020-06-24 ENCOUNTER — APPOINTMENT (OUTPATIENT)
Dept: HEMATOLOGY ONCOLOGY | Facility: MEDICAL CENTER | Age: 50
End: 2020-06-24
Payer: COMMERCIAL

## 2020-06-24 DIAGNOSIS — R53.83 OTHER FATIGUE: ICD-10-CM

## 2020-06-24 DIAGNOSIS — R59.0 MEDIASTINAL ADENOPATHY: ICD-10-CM

## 2020-06-24 DIAGNOSIS — R06.02 SOB (SHORTNESS OF BREATH): ICD-10-CM

## 2020-06-24 DIAGNOSIS — R59.0 HILAR ADENOPATHY: ICD-10-CM

## 2020-07-07 ENCOUNTER — HOSPITAL ENCOUNTER (OUTPATIENT)
Dept: RADIOLOGY | Facility: MEDICAL CENTER | Age: 50
End: 2020-07-07
Attending: NURSE PRACTITIONER
Payer: COMMERCIAL

## 2020-07-07 PROCEDURE — A9552 F18 FDG: HCPCS

## 2020-07-08 ASSESSMENT — ENCOUNTER SYMPTOMS
FEVER: 0
PALPITATIONS: 0
TINGLING: 0
CHILLS: 0
DIARRHEA: 0
WHEEZING: 0
NAUSEA: 0

## 2020-07-08 NOTE — PROGRESS NOTES
Subjective:      Arnulfo Cotton is a 50 y.o. male who presents for Other (abnormal PET)        HPI   Mr. Cotton is referred to [facility] per PCP for further evaluation of lymphadenopathy and presents today to review PET scan results. He is accompanied by his wife and his mother is present via phone.    Patient was referred to [facility] following incidental finding of extensive mediastinal and hilar adenopathy noted per CT on 6/4/20 per ER workup for stroke. PET scan completed 7/7/20 shows FDG avid mediastinal and hilar adenopathy is highly suspicious for lymphoma.    Patient reports persistent fatigue and is noted to have lost 10 pounds since his visit on 6/15/2020.  He reports mildly poor appetite but attributed weight loss more to dysphagia and chest pressure he notes when eating or drinking too quickly. He denies heartburn. He experiences intermitttent shortness of breath and associated dizziness and feels it is sometimes quite difficult to take a deep breath.  He experiences generalized muscle cramping when he is more short of breath.  He reports generalized mild pruritus without rash and intermittent diaphoresis, without associated fever or chills.      Patient's wife and mother confirmed that patient's sister was diagnosed sarcoidosis of lungs w/in the past year. Family history updated.        No Known Allergies        Current Outpatient Medications on File Prior to Visit   Medication Sig Dispense Refill   • tamsulosin (FLOMAX) 0.4 MG capsule Take 0.4 mg by mouth every evening.     • metFORMIN (GLUCOPHAGE) 1000 MG tablet Take 1 Tab by mouth 2 times a day. 180 Tab 3   • glipiZIDE (GLUCOTROL) 5 MG Tab Take 1 Tab by mouth 2 times daily, before breakfast and dinner. 180 Tab 3   • Empagliflozin 25 MG Tab Take 1 Each by mouth every day. 30 Tab 6   • atorvastatin (LIPITOR) 20 MG Tab Take 1 Tab by mouth every day. 90 Tab 3   • therapeutic multivitamin-minerals (THERAGRAN-M) Tab Take 1 Tab by mouth every day.     •  "INSULIN GLARGINE 100 UNIT/ML injection PEN      • insulin glargine (INSULIN GLARGINE) 100 UNIT/ML Solution Pen-injector injection Inject 10 Units as instructed every evening. (Patient not taking: Reported on 6/23/2020) 5 PEN 11   • Insulin Pen Needle (B-D ULTRAFINE III SHORT PEN) Use as directed with insulin pen (Patient not taking: Reported on 6/23/2020) 90 Each 11     No current facility-administered medications on file prior to visit.            Review of Systems   Constitutional: Positive for malaise/fatigue (\"pretty tired\") and weight loss (10 lbs. since 6/15 visit). Negative for chills, diaphoresis and fever.   Respiratory: Positive for cough (can be triggered with SOB and deep breaths) and shortness of breath (more than usual). Negative for wheezing.    Cardiovascular: Negative for chest pain, palpitations and leg swelling.   Gastrointestinal: Positive for constipation (consistent with baseline; better with diet) and vomiting (better with soft foods and good chewy). Negative for blood in stool, diarrhea, heartburn (\"pressure\" but has not taken tums or anything) and nausea.        Harder to swallow   Genitourinary: Negative for dysuria.   Musculoskeletal: Positive for myalgias (cramping when SOB and feels like \"low O2\").   Skin: Positive for itching (\"all over\", leg and hips mostly - fairly constant).   Neurological: Positive for dizziness (neds to take some good deep breaths to get enough air), sensory change (both big toes from h/o DM) and headaches (better with good deep breaths). Negative for tingling.   Psychiatric/Behavioral: The patient has insomnia (harder when laying on back; better on L side).           Objective:     /72 (BP Location: Right arm, Patient Position: Sitting, BP Cuff Size: Adult)   Pulse 82   Temp 36.8 °C (98.3 °F) (Temporal)   Resp 16   Ht 1.815 m (5' 11.46\")   Wt 114.9 kg (253 lb 6.7 oz)   SpO2 96%   BMI 34.89 kg/m²      Physical Exam  Vitals signs reviewed. "   Constitutional:       General: He is not in acute distress.     Appearance: He is well-developed. He is not diaphoretic.   HENT:      Head: Normocephalic and atraumatic.      Mouth/Throat:      Pharynx: No oropharyngeal exudate.   Eyes:      General: No scleral icterus.        Right eye: No discharge.         Left eye: No discharge.      Conjunctiva/sclera: Conjunctivae normal.      Pupils: Pupils are equal, round, and reactive to light.   Neck:      Musculoskeletal: Normal range of motion and neck supple.      Thyroid: No thyromegaly.   Cardiovascular:      Rate and Rhythm: Normal rate and regular rhythm.      Heart sounds: Normal heart sounds. No murmur. No friction rub. No gallop.    Pulmonary:      Effort: No respiratory distress.      Breath sounds: Normal breath sounds. No wheezing.      Comments: SOB w/ speech and exertion  Abdominal:      General: Bowel sounds are normal. There is no distension.      Palpations: Abdomen is soft.      Tenderness: There is no abdominal tenderness.   Musculoskeletal: Normal range of motion.         General: No tenderness.   Lymphadenopathy:      Head:      Right side of head: No submental, submandibular, tonsillar, preauricular, posterior auricular or occipital adenopathy.      Left side of head: No submental, submandibular, tonsillar, preauricular, posterior auricular or occipital adenopathy.      Cervical: No cervical adenopathy.      Right cervical: No superficial or deep cervical adenopathy.     Left cervical: No superficial or deep cervical adenopathy.      Upper Body:      Right upper body: No supraclavicular, axillary, pectoral or epitrochlear adenopathy.      Left upper body: No supraclavicular, axillary, pectoral or epitrochlear adenopathy.      Lower Body: No right inguinal adenopathy. No left inguinal adenopathy.   Skin:     General: Skin is warm and dry.      Coloration: Skin is not pale.      Findings: No erythema or rash.   Neurological:      Mental Status: He  is alert and oriented to person, place, and time.   Psychiatric:         Behavior: Behavior normal.      Comments: Some anxiety with mask in place & SOB               Results for CHRISTIAN KAM (MRN [id number]) as of 7/8/2020 10:23   Ref. Range 6/4/2020 12:42 6/9/2020 10:53   WBC Latest Ref Range: 4.8 - 10.8 K/uL 6.3    RBC Latest Ref Range: 4.70 - 6.10 M/uL 5.65    Hemoglobin Latest Ref Range: 14.0 - 18.0 g/dL 16.5    Hematocrit Latest Ref Range: 42.0 - 52.0 % 48.0    MCV Latest Ref Range: 81.4 - 97.8 fL 85.0    MCH Latest Ref Range: 27.0 - 33.0 pg 29.2    MCHC Latest Ref Range: 33.7 - 35.3 g/dL 34.4    RDW Latest Ref Range: 35.9 - 50.0 fL 41.5    Platelet Count Latest Ref Range: 164 - 446 K/uL 250    MPV Latest Ref Range: 9.0 - 12.9 fL 10.0    Neutrophils-Polys Latest Ref Range: 44.00 - 72.00 % 69.50    Neutrophils (Absolute) Latest Ref Range: 1.82 - 7.42 K/uL 4.39    Lymphocytes Latest Ref Range: 22.00 - 41.00 % 18.10 (L)    Lymphs (Absolute) Latest Ref Range: 1.00 - 4.80 K/uL 1.14    Monocytes Latest Ref Range: 0.00 - 13.40 % 8.70    Monos (Absolute) Latest Ref Range: 0.00 - 0.85 K/uL 0.55    Eosinophils Latest Ref Range: 0.00 - 6.90 % 3.00    Eos (Absolute) Latest Ref Range: 0.00 - 0.51 K/uL 0.19    Basophils Latest Ref Range: 0.00 - 1.80 % 0.50    Baso (Absolute) Latest Ref Range: 0.00 - 0.12 K/uL 0.03    Immature Granulocytes Latest Ref Range: 0.00 - 0.90 % 0.20    Immature Granulocytes (abs) Latest Ref Range: 0.00 - 0.11 K/uL 0.01    Nucleated RBC Latest Units: /100 WBC 0.00    NRBC (Absolute) Latest Units: K/uL 0.00    Sodium Latest Ref Range: 135 - 145 mmol/L 141    Potassium Latest Ref Range: 3.6 - 5.5 mmol/L 4.1    Chloride Latest Ref Range: 96 - 112 mmol/L 103    Co2 Latest Ref Range: 20 - 33 mmol/L 21    Anion Gap Latest Ref Range: 7.0 - 16.0  17.0 (H)    Glucose Latest Ref Range: 65 - 99 mg/dL 348 (H)    Bun Latest Ref Range: 8 - 22 mg/dL 13    Creatinine Latest Ref Range: 0.50 - 1.40 mg/dL 0.74     GFR If  Latest Ref Range: >60 mL/min/1.73 m 2 >60    GFR If Non  Latest Ref Range: >60 mL/min/1.73 m 2 >60    Calcium Latest Ref Range: 8.4 - 10.2 mg/dL 9.9    AST(SGOT) Latest Ref Range: 12 - 45 U/L 21    ALT(SGPT) Latest Ref Range: 2 - 50 U/L 26    Alkaline Phosphatase Latest Ref Range: 30 - 99 U/L 116 (H)    Total Bilirubin Latest Ref Range: 0.1 - 1.5 mg/dL 0.7    Albumin Latest Ref Range: 3.2 - 4.9 g/dL 4.2    Total Protein Latest Ref Range: 6.0 - 8.2 g/dL 7.0    Globulin Latest Ref Range: 1.9 - 3.5 g/dL 2.8    A-G Ratio Latest Units: g/dL 1.5    Glycohemoglobin Latest Ref Range: 0.0 - 5.6 %  12.4 (A)   Troponin T Latest Ref Range: 6 - 19 ng/L 9    INR Latest Ref Range: 0.87 - 1.13  0.97    PT Latest Ref Range: 12.0 - 14.6 sec 13.0    APTT Latest Ref Range: 24.7 - 36.0 sec 30.5            PET  7/7/2020 1:06 PM  HISTORY/REASON FOR EXAM:  Mediastinal adenopathy on recent CTA     TECHNIQUE/EXAM DESCRIPTION AND NUMBER OF VIEWS:  PET body imaging.     Initially, 17.43 mCi F-18 FDG was administered intravenously under standardized conditions. Approximately 45 minutes after FDG administration, the patient was placed in the supine position on the PET CT table. Blood glucose level was 124 mg/dL.     Low dose spiral CT imaging was performed from the skull base to the mid thighs.     PET imaging was then performed from the skull base to the mid thighs. CT images, PET images, and PET/CT fused images were reviewed on a PACS 3D workstation.     The limited non-contrast CT data are used primarily for attenuation correction and anatomic correlation.  Evaluation of solid organs and bowel are especially limited utilizing this technique.        COMPARISON: CTA neck 6/4/2020     FINDINGS:  Head and neck: No abnormal focal FDG activity.     Chest: A prevascular lymph node measures approximately 1.6 cm short axis. Maximum SUV is approximately 7.9. Enlarged precarinal and pretracheal lymph nodes are  FDG avid with a maximum SUV of 8.9. Bilateral hilar activity is present with a maximum SUV of approximately 10.7 on the left and 8.6 on the right. Subcarinal adenopathy is FDG avid with a maximum SUV of 8.8.     Abdomen and pelvis: Enlarged periportal lymph nodes are mildly FDG avid with a maximum SUV of approximately 4.3.     Splenic activity is less than that of the liver. The spleen measures approximately 11.5 cm.     Patchy bowel activity is likely physiologic.     Musculoskeletal: No abnormal focal FDG activity.     Incidental findings on CT: There are a few scattered colonic diverticula. There is patchy groundglass density with atelectasis at the lung bases.     IMPRESSION:   1.  FDG avid mediastinal and hilar adenopathy is highly suspicious for lymphoma. Additional enlarged periportal lymph nodes are mildly FDG avid and concerning for lymphomatous involvement as well.         CT Neck  6/4/2020 1:32 PM  HISTORY/REASON FOR EXAM:  Neuro deficit, acute, stroke suspected; stroke protocol        TECHNIQUE/EXAM DESCRIPTION:  CT angiogram of the neck with contrast.     Postcontrast images were obtained of the neck from the great vessels through the skull base following the power injection of nonionic contrast at 5.0 mL/sec. Thin-section helical images were obtained with overlapping reconstruction interval. Coronal and   oblique multiplanar volume reformats were generated.     Cervical internal carotid artery percent stenosis is calculated using the standard method according to the NASCET criteria wherein a segment of uniform caliber mid or distal cervical internal carotid is used as the reference denominator.     3D angiographic images were reviewed on PACS.  Maximum intensity projection (MIP) images were generated and reviewed     80 mL of Omnipaque 350 nonionic contrast was injected intravenously.     Low dose optimization technique was utilized for this CT exam including automated exposure control and adjustment  of the mA and/or kV according to patient size.     COMPARISON:  None.     FINDINGS:  The arch origins of the great vessels appear intact. The aortic arch shows no abnormality.     There is minimal soft plaque centered at the carotid bulbs.     The right common carotid artery, cervical carotid bifurcation, and cervical internal carotid artery are otherwise within normal limits. There is no evidence of significant stenosis, thrombus, or other filling defect or ulceration. There is no evidence of   dissection or aneurysm.     The left common carotid artery, cervical carotid bifurcation, and cervical internal carotid artery are otherwise within normal limits. There is no evidence of significant stenosis, thrombus, or other filling defect or ulceration. There is no evidence of   dissection or aneurysm.     The cervical vertebral arteries are normal bilaterally.     The neck soft tissues and lung apices in the field of view are abnormal with extensive mediastinal lymphadenopathy. There is a 17 mm short axis node anterior to the right mainstem bronchus. There are aortopulmonary window nodes measuring up to 16 mm   short axis. There is an increased number of enlarged nodes. Right hilar yusra tissue measures at least 21 mm short axis     There are no enlarged cervical nodes but there is an increased number of normal-sized nodes     3D angiographic/MIP images of the vasculature confirm the vascular findings as described above.     IMPRESSION:  CT angiogram of the neck notable only for mild soft plaque centered at the carotid bulbs. No stenosis, aneurysm, or occlusion     Extensive mediastinal and right hilar lymphadenopathy is highly worrisome for malignancy such as lymphoma favored over metastases or sarcoidosis     Findings were discussed with CHERRIE DON on 6/4/2020 2:17 PM.            Assessment/Plan:     1. Abnormal positron emission tomography (PET) scan  REFERRAL TO GASTROENTEROLOGY   2. Mediastinal adenopathy   REFERRAL TO GASTROENTEROLOGY   3. Hilar adenopathy  REFERRAL TO GASTROENTEROLOGY         1.  Mediastinal/hilar adenopathy: Patient referred to [facility] for evaluation of incidental findings of mediastinal/hilar adenopathy per CT at ER visit on 6/4/20. PET scan was completed 7/7/20 showing FDG avid mediastinal and hilar adenopathy, highly suspicious for lymphoma. Patient is referred to Dr. Hough for expedited EUS biopsy. He will return following biopsy for review of pathology.    Patient and his spouse verbalized understanding regarding further treatment planning being based on biopsy pathology results, which may include referral to medical oncology.        The patient verbalized agreement and understanding of current plan. All questions and concerns were addressed at time of visit.    Please note that this dictation was created using voice recognition software. I have made every reasonable attempt to correct obvious errors, but I expect that there are errors of grammar and possibly content that I did not discover before finalizing the note.

## 2020-07-09 ENCOUNTER — OFFICE VISIT (OUTPATIENT)
Dept: HEMATOLOGY ONCOLOGY | Facility: MEDICAL CENTER | Age: 50
End: 2020-07-09
Payer: COMMERCIAL

## 2020-07-09 VITALS
SYSTOLIC BLOOD PRESSURE: 126 MMHG | HEART RATE: 82 BPM | WEIGHT: 253.42 LBS | BODY MASS INDEX: 35.48 KG/M2 | DIASTOLIC BLOOD PRESSURE: 72 MMHG | OXYGEN SATURATION: 96 % | HEIGHT: 71 IN | TEMPERATURE: 98.3 F | RESPIRATION RATE: 16 BRPM

## 2020-07-09 DIAGNOSIS — R59.0 HILAR ADENOPATHY: ICD-10-CM

## 2020-07-09 DIAGNOSIS — R59.0 MEDIASTINAL ADENOPATHY: ICD-10-CM

## 2020-07-09 DIAGNOSIS — R94.8 ABNORMAL POSITRON EMISSION TOMOGRAPHY (PET) SCAN: ICD-10-CM

## 2020-07-09 PROCEDURE — 99214 OFFICE O/P EST MOD 30 MIN: CPT | Performed by: NURSE PRACTITIONER

## 2020-07-09 ASSESSMENT — ENCOUNTER SYMPTOMS
HEARTBURN: 0
DIAPHORESIS: 0
SENSORY CHANGE: 1
SHORTNESS OF BREATH: 1
DIZZINESS: 1
COUGH: 1
HEADACHES: 1
BLOOD IN STOOL: 0
CONSTIPATION: 1
VOMITING: 1
INSOMNIA: 1
MYALGIAS: 1

## 2020-07-09 ASSESSMENT — FIBROSIS 4 INDEX: FIB4 SCORE: 0.79

## 2020-07-09 ASSESSMENT — PAIN SCALES - GENERAL: PAINLEVEL: NO PAIN

## 2020-07-09 NOTE — Clinical Note
Dr. Hough accepted patient and agreed to expedited biopsy. I emailed [facility] and provided her the patient's contact info. Please forward referral, ASAP.  Thank you

## 2020-07-10 ASSESSMENT — ENCOUNTER SYMPTOMS: WEIGHT LOSS: 1

## 2020-08-18 ENCOUNTER — HOSPITAL ENCOUNTER (OUTPATIENT)
Dept: CARDIOLOGY | Facility: MEDICAL CENTER | Age: 50
End: 2020-08-18
Attending: PHYSICIAN ASSISTANT
Payer: COMMERCIAL

## 2020-08-18 ENCOUNTER — HOSPITAL ENCOUNTER (OUTPATIENT)
Dept: RADIOLOGY | Facility: MEDICAL CENTER | Age: 50
End: 2020-08-18
Attending: INTERNAL MEDICINE
Payer: COMMERCIAL

## 2020-08-18 DIAGNOSIS — N40.0 BENIGN PROSTATIC HYPERPLASIA, UNSPECIFIED WHETHER LOWER URINARY TRACT SYMPTOMS PRESENT: ICD-10-CM

## 2020-08-18 DIAGNOSIS — R13.19 OTHER DYSPHAGIA: ICD-10-CM

## 2020-08-18 DIAGNOSIS — R06.00 DYSPNEA, UNSPECIFIED TYPE: ICD-10-CM

## 2020-08-18 DIAGNOSIS — E13.649 UNCONTROLLED DIABETES MELLITUS OF OTHER TYPE WITH HYPOGLYCEMIA, UNSPECIFIED HYPOGLYCEMIA COMA STATUS (HCC): ICD-10-CM

## 2020-08-18 DIAGNOSIS — R93.89 ABNORMAL TOMOGRAPHY OF CHEST: ICD-10-CM

## 2020-08-18 DIAGNOSIS — R59.0 MEDIASTINAL LYMPHADENOPATHY: ICD-10-CM

## 2020-08-18 DIAGNOSIS — R74.8 ALKALINE PHOSPHATASE ELEVATION: ICD-10-CM

## 2020-08-18 DIAGNOSIS — E78.5 DYSLIPIDEMIA: ICD-10-CM

## 2020-08-18 LAB — EKG IMPRESSION: NORMAL

## 2020-08-18 PROCEDURE — 93005 ELECTROCARDIOGRAM TRACING: CPT | Performed by: PHYSICIAN ASSISTANT

## 2020-08-18 PROCEDURE — 700117 HCHG RX CONTRAST REV CODE 255: Performed by: INTERNAL MEDICINE

## 2020-08-18 PROCEDURE — 700112 HCHG RX REV CODE 229: Performed by: INTERNAL MEDICINE

## 2020-08-18 PROCEDURE — 93010 ELECTROCARDIOGRAM REPORT: CPT | Performed by: INTERNAL MEDICINE

## 2020-08-18 PROCEDURE — 74221 X-RAY XM ESOPHAGUS 2CNTRST: CPT

## 2020-08-18 PROCEDURE — A9270 NON-COVERED ITEM OR SERVICE: HCPCS | Performed by: INTERNAL MEDICINE

## 2020-08-18 RX ADMIN — BARIUM SULFATE 700 MG: 700 TABLET ORAL at 13:45

## 2020-08-18 RX ADMIN — ANTACID/ANTIFLATULENT 1 PACKET: 380; 550; 10; 10 GRANULE, EFFERVESCENT ORAL at 13:45

## 2020-08-27 ENCOUNTER — TELEPHONE (OUTPATIENT)
Dept: HEMATOLOGY ONCOLOGY | Facility: MEDICAL CENTER | Age: 50
End: 2020-08-27

## 2020-08-27 DIAGNOSIS — R59.0 MEDIASTINAL ADENOPATHY: ICD-10-CM

## 2020-08-27 DIAGNOSIS — R94.8 ABNORMAL POSITRON EMISSION TOMOGRAPHY (PET) SCAN: ICD-10-CM

## 2020-08-27 DIAGNOSIS — R59.0 HILAR ADENOPATHY: ICD-10-CM

## 2020-08-27 NOTE — TELEPHONE ENCOUNTER
Please see message from medical assistant, Martha.  Patient currently being worked up for possible lymphoma.  Personally spoke with Dr. Holcomb with pulmonary on seeing the patient as soon as possible in order to get moving forward with biopsy of the mediastinal/hilar lymph nodes.  Dr. Holcomb has agreed to see the patient in his office and will arrange biopsy procedure as soon as possible.    I contacted the patient personally and let him know of the referral and that he should be seen very shortly and get moving on diagnosis.  Patient was appreciative of the phone call and in agreement with the plan.    1. Abnormal positron emission tomography (PET) scan  REFERRAL TO PULMONARY AND SLEEP MEDICINE General Pulmonary   2. Mediastinal adenopathy  REFERRAL TO PULMONARY AND SLEEP MEDICINE General Pulmonary   3. Hilar adenopathy  REFERRAL TO PULMONARY AND SLEEP MEDICINE General Pulmonary

## 2020-08-27 NOTE — TELEPHONE ENCOUNTER
Phone Number Called: [phone] (home)     Call outcome: Spoke to patient regarding message below.    Message: Spoke to pt about how he is feeling and what we could do to get follow up with his needs per [place] wanted me to give pt call to see if he was ty with pulm or what    According to pt he is not ty with anyone that he is aware of no one has called him back, he states that he is still coughing really bad and really hard, to the point where his air way gets cut off, he states he is afraid of going to the hospital because of the COIVD.  Asked if we could do anything to help him and he stated he would like to go through with the breathing test but no one has done anything about it. He is also concerned why everything is going so slow he would like to just be followed up on..

## 2020-08-28 ENCOUNTER — OFFICE VISIT (OUTPATIENT)
Dept: PULMONOLOGY | Facility: HOSPICE | Age: 50
End: 2020-08-28
Payer: COMMERCIAL

## 2020-08-28 VITALS
HEIGHT: 70 IN | SYSTOLIC BLOOD PRESSURE: 122 MMHG | BODY MASS INDEX: 35.42 KG/M2 | HEART RATE: 98 BPM | WEIGHT: 247.38 LBS | OXYGEN SATURATION: 96 % | DIASTOLIC BLOOD PRESSURE: 90 MMHG

## 2020-08-28 DIAGNOSIS — R59.0 MEDIASTINAL ADENOPATHY: ICD-10-CM

## 2020-08-28 DIAGNOSIS — R06.2 WHEEZING: ICD-10-CM

## 2020-08-28 DIAGNOSIS — R05.9 COUGH: ICD-10-CM

## 2020-08-28 PROCEDURE — 99203 OFFICE O/P NEW LOW 30 MIN: CPT | Performed by: INTERNAL MEDICINE

## 2020-08-28 ASSESSMENT — FIBROSIS 4 INDEX: FIB4 SCORE: 0.79

## 2020-08-28 NOTE — PROGRESS NOTES
Chief Complaint   Patient presents with   • Establish Care     Referred by Makenna MEHTA for Mediastinal Adenopathy   • Other     PET/CT 07/07/20, CXR 06/04/20, DX:Esophagus Barium Swallow 08/18/20       Problems:   1. Mediastinal adenopathy    2. Cough    3. Wheezing        Assessment/Plan:   # Mediastinal adenopathy   -- Review of PET scan showed mediastinal adenopathy (3A, 4R, 4L, 7, 10R, 10L) with significant avid uptake and bilateral lower lobe atelectasis.   -- Differentials include infectious vs primary bronchogenic CA vs lymphoma vs metastatic disease vs noninfectious (ie, granulomatous disease).   -- Reviewed PET scan and discussed about the possibilities as above with patient   -- Discussed about risks and benefits of undergoing bronchoscopy/EBUS with TBNA. Risks discussed include but were not limited to bleeding, pneumothorax, respiratory failure requiring ventilatory support, infection, and possible death. Questions were encouraged and answered. Patient endorsed understanding and agreed to proceed with it.    -- Will proceed with bronchoscopy/EBUS with TBNA as above    -- RTC 4 weeks    # Cough    -- Differentials include airway reactive disease vs enlarging adenopathy causing airway narrowing leading to recurrent atelectasis and cough vs GERD  -- Start trial of breo once daily (sample provided)   -- Check PFTs    -- Mediastinal adenopathy workup as above   -- Discussed about the importance of using face mask while at work to prevent environmental exposure.    # Wheezing    -- Concern for airway narrowing vs airway reactive disease   -- Check PFTs and start trial of breo    -- RTC 4 weeks     HPI:  Arnulfo Cotton is a 50 year old male with history of DM and hyperlipidemia referred to the pulmonary clinic for mediastinal adenopathy evaluation. Patient report having a headache associated with N/V which prompted him to be seen in the ER. He was workup for stroke and CTA neck showed extensive  mediastinal adenopathy which he was referred to Makenna Lopez for further evaluation. Patient underwent PET scan showed avid uptake in mediastinal and hilar.     Subjectively, he reports having shortness of breath on exertion. Able to walk up 5 steps of stair before he has to stop and rest. Associated with intermittent productive cough consisting of white to light green phlegm and wheezing at night time. He also reports having body ache and unintentional weight loss (~6 pounds ~1 week). Denies fever/chills, chest pain, N/V, decrease in appetite, recent sick contact, orthopnea, LE swelling. No exposure to anyone with known or suspected COVID-19.      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 Family history significant for CAD but not sure about cancer. Never had colonoscopy.     Past Medical History:   Diagnosis Date   • Type II or unspecified type diabetes mellitus without mention of complication, not stated as uncontrolled        Past Surgical History:   Procedure Laterality Date   • OTHER ORTHOPEDIC SURGERY  6    Jaw mass       ROS:   Constitutional: Denies fevers, chills, night sweats, fatigue or weight loss  Eyes: Denies vision loss, pain, drainage, double vision  Ears, Nose, Throat: Denies earache, tinnitus, hoarseness  Cardiovascular: Denies chest pain, tightness, palpitations  Respiratory: See HPI  GI: Denies abdominal pain, nausea, vomiting, diarrhea  : Denies frequent urination, hematuria, painful urination  Musculoskeletal: Denies back pain, painful joints, sore muscles  Neurological: Denies headaches, seizures  Skin: Denies rashes, color changes  Psychiatric: Denies depression or thoughts of suicide  Hematologic: Denies bleeding tendency or clotting tendency  Allergic/Immunologic: Denies rhinitis, skin sensitivity    Social History     Socioeconomic History   • Marital status:      Spouse name: Not on file   • Number of children: Not  on file   • Years of education: Not on file   • Highest education level: Not on file   Occupational History   • Not on file   Social Needs   • Financial resource strain: Not on file   • Food insecurity     Worry: Not on file     Inability: Not on file   • Transportation needs     Medical: Not on file     Non-medical: Not on file   Tobacco Use   • Smoking status: Never Smoker   • Smokeless tobacco: Former User     Types: Chew   Substance and Sexual Activity   • Alcohol use: No     Alcohol/week: 0.0 oz     Frequency: Never     Binge frequency: Never   • Drug use: No   • Sexual activity: Yes     Partners: Female   Lifestyle   • Physical activity     Days per week: Not on file     Minutes per session: Not on file   • Stress: Not on file   Relationships   • Social connections     Talks on phone: Not on file     Gets together: Not on file     Attends [employer] service: Not on file     Active member of club or organization: Not on file     Attends meetings of clubs or organizations: Not on file     Relationship status: Not on file   • Intimate partner violence     Fear of current or ex partner: Not on file     Emotionally abused: Not on file     Physically abused: Not on file     Forced sexual activity: Not on file   Other Topics Concern   • Not on file   Social History Narrative   • Not on file     Patient has no known allergies.  Current Outpatient Medications on File Prior to Visit   Medication Sig Dispense Refill   • INSULIN GLARGINE 100 UNIT/ML injection PEN      • insulin glargine (INSULIN GLARGINE) 100 UNIT/ML Solution Pen-injector injection Inject 10 Units as instructed every evening. (Patient not taking: Reported on 6/23/2020) 5 PEN 11   • Insulin Pen Needle (B-D ULTRAFINE III SHORT PEN) Use as directed with insulin pen (Patient not taking: Reported on 6/23/2020) 90 Each 11   • tamsulosin (FLOMAX) 0.4 MG capsule Take 0.4 mg by mouth every evening.     • metFORMIN (GLUCOPHAGE) 1000 MG tablet Take 1 Tab by mouth 2  times a day. 180 Tab 3   • glipiZIDE (GLUCOTROL) 5 MG Tab Take 1 Tab by mouth 2 times daily, before breakfast and dinner. 180 Tab 3   • Empagliflozin 25 MG Tab Take 1 Each by mouth every day. 30 Tab 6   • atorvastatin (LIPITOR) 20 MG Tab Take 1 Tab by mouth every day. 90 Tab 3   • therapeutic multivitamin-minerals (THERAGRAN-M) Tab Take 1 Tab by mouth every day.       No current facility-administered medications on file prior to visit.      There were no vitals taken for this visit.  Family History   Problem Relation Age of Onset   • Diabetes Mother    • Hypertension Mother    • Diabetes Father    • Other Sister         2019 dx sarcoidosis of lungs   • Cancer Paternal Uncle         melanoma   • Diabetes Maternal Grandmother    • Cancer Maternal Grandfather         stomach   • Diabetes Paternal Grandfather    • Cancer Maternal Aunt         cancer unknown   • Heart Disease Neg Hx    • Stroke Neg Hx      Immunization History   Administered Date(s) Administered   • Influenza Vaccine Quad Inj (Pf) 03/02/2017, 12/16/2019   • Influenza Vaccine Quad Inj (Preserved) 10/14/2015   • Pneumococcal polysaccharide vaccine (PPSV-23) 10/14/2015   • Tdap Vaccine 10/06/2015   • Tuberculin Skin Test 08/17/2015         Physical Exam:  General: NAD. Speaking in full sentence.   HEENT: PERRLA, EOMI, no scleral icterus, no nasal or oral lesions  Neck: No thyromegaly, no adenopathy, no bruits  Mallampatti: Grade II  Lungs: Equal breath sounds, no wheezes or crackles  Heart: Regular rate and rhythm, no gallops or murmurs  Abdomen: Soft, benign, no organomegaly  Extremities: No clubbing, cyanosis, or edema  Neurologic: Cranial nerve, motor, and sensory exam are normal    Barium Swallow:   1. Mild distal esophageal dysmotility, nonspecific.  2. Mild extrinsic mass effect on the mid esophagus, likely related to lymphadenopathy seen on the recent PET/CT. Barium tablet freely passes into the stomach.  3. Gastroesophageal reflux. No hiatal  hernia.    Naheed Holcomb MD   Pulmonary Critical Care   [facility]

## 2020-08-31 ENCOUNTER — TELEPHONE (OUTPATIENT)
Dept: PULMONOLOGY | Facility: HOSPICE | Age: 50
End: 2020-08-31

## 2020-08-31 NOTE — TELEPHONE ENCOUNTER
Order placed for EBUS Bronch by Dr. Holcomb - Dr. Holcomb will be in clinic tomorrow I will discuss case with doctor and reach out to patient.

## 2020-09-02 NOTE — TELEPHONE ENCOUNTER
Scheduled for 9/10/2020 checking in at 0600 for a 0800 start time.   Dr. Holcomb will be doing the procedure    Confirmed with patient and he requested for all info to by mycharted to him.    That has been completed.

## 2020-09-08 ENCOUNTER — PRE-ADMISSION TESTING (OUTPATIENT)
Dept: ADMISSIONS | Facility: MEDICAL CENTER | Age: 50
End: 2020-09-08
Attending: INTERNAL MEDICINE
Payer: COMMERCIAL

## 2020-09-08 DIAGNOSIS — Z01.812 PRE-OPERATIVE LABORATORY EXAMINATION: ICD-10-CM

## 2020-09-08 LAB
ANION GAP SERPL CALC-SCNC: 15 MMOL/L (ref 7–16)
BUN SERPL-MCNC: 19 MG/DL (ref 8–22)
CALCIUM SERPL-MCNC: 10 MG/DL (ref 8.4–10.2)
CHLORIDE SERPL-SCNC: 101 MMOL/L (ref 96–112)
CO2 SERPL-SCNC: 25 MMOL/L (ref 20–33)
COVID ORDER STATUS COVID19: NORMAL
CREAT SERPL-MCNC: 0.75 MG/DL (ref 0.5–1.4)
GLUCOSE SERPL-MCNC: 339 MG/DL (ref 65–99)
POTASSIUM SERPL-SCNC: 4 MMOL/L (ref 3.6–5.5)
SARS-COV-2 RNA RESP QL NAA+PROBE: NOTDETECTED
SODIUM SERPL-SCNC: 141 MMOL/L (ref 135–145)
SPECIMEN SOURCE: NORMAL

## 2020-09-08 PROCEDURE — C9803 HOPD COVID-19 SPEC COLLECT: HCPCS

## 2020-09-08 PROCEDURE — U0003 INFECTIOUS AGENT DETECTION BY NUCLEIC ACID (DNA OR RNA); SEVERE ACUTE RESPIRATORY SYNDROME CORONAVIRUS 2 (SARS-COV-2) (CORONAVIRUS DISEASE [COVID-19]), AMPLIFIED PROBE TECHNIQUE, MAKING USE OF HIGH THROUGHPUT TECHNOLOGIES AS DESCRIBED BY CMS-2020-01-R: HCPCS

## 2020-09-08 PROCEDURE — 36415 COLL VENOUS BLD VENIPUNCTURE: CPT

## 2020-09-08 PROCEDURE — 80048 BASIC METABOLIC PNL TOTAL CA: CPT

## 2020-09-08 ASSESSMENT — FIBROSIS 4 INDEX: FIB4 SCORE: 0.79

## 2020-09-08 NOTE — OR NURSING
"Preadmit appointment: \" Preparing for your Procedure information\" sheet given to patient with verbal and written instructions. Patient instructed to continue prescribed medications through the day before surgery, instructed to take the following medications the day of surgery per anesthesia protocol: Breo, Tamsulosin.  Pt denies issues with anesthesia.  covid test to be done today and pt given verbal and handout instructions on self isolation and covid symptoms to watch for and to notify MD if any symptoms develop  "

## 2020-09-10 ENCOUNTER — TELEPHONE (OUTPATIENT)
Dept: HEMATOLOGY ONCOLOGY | Facility: MEDICAL CENTER | Age: 50
End: 2020-09-10

## 2020-09-10 ENCOUNTER — ANESTHESIA (OUTPATIENT)
Dept: SURGERY | Facility: MEDICAL CENTER | Age: 50
End: 2020-09-10
Payer: COMMERCIAL

## 2020-09-10 ENCOUNTER — APPOINTMENT (OUTPATIENT)
Dept: RADIOLOGY | Facility: MEDICAL CENTER | Age: 50
End: 2020-09-10
Attending: INTERNAL MEDICINE
Payer: COMMERCIAL

## 2020-09-10 ENCOUNTER — ANESTHESIA EVENT (OUTPATIENT)
Dept: SURGERY | Facility: MEDICAL CENTER | Age: 50
End: 2020-09-10
Payer: COMMERCIAL

## 2020-09-10 ENCOUNTER — HOSPITAL ENCOUNTER (OUTPATIENT)
Facility: MEDICAL CENTER | Age: 50
End: 2020-09-10
Attending: INTERNAL MEDICINE | Admitting: INTERNAL MEDICINE
Payer: COMMERCIAL

## 2020-09-10 VITALS
WEIGHT: 247.8 LBS | DIASTOLIC BLOOD PRESSURE: 80 MMHG | TEMPERATURE: 97.3 F | OXYGEN SATURATION: 95 % | BODY MASS INDEX: 35.48 KG/M2 | HEIGHT: 70 IN | RESPIRATION RATE: 16 BRPM | SYSTOLIC BLOOD PRESSURE: 116 MMHG | HEART RATE: 95 BPM

## 2020-09-10 LAB
APPEARANCE FLD: NORMAL
BODY FLD TYPE: NORMAL
COLOR FLD: NORMAL
CYTOLOGY REG CYTOL: NORMAL
CYTOLOGY REG CYTOL: NORMAL
GLUCOSE BLD-MCNC: 194 MG/DL (ref 65–99)
LYMPHOCYTES NFR FLD: 6 %
MONONUC CELLS NFR FLD: 16 %
NEUTROPHILS NFR FLD: 78 %
PATHOLOGY CONSULT NOTE: NORMAL
RBC # FLD: NORMAL CELLS/UL
WBC # FLD: NORMAL CELLS/UL

## 2020-09-10 PROCEDURE — 88173 CYTOPATH EVAL FNA REPORT: CPT | Mod: 91

## 2020-09-10 PROCEDURE — 87102 FUNGUS ISOLATION CULTURE: CPT

## 2020-09-10 PROCEDURE — 88112 CYTOPATH CELL ENHANCE TECH: CPT

## 2020-09-10 PROCEDURE — 160041 HCHG SURGERY MINUTES - EA ADDL 1 MIN LEVEL 4: Performed by: INTERNAL MEDICINE

## 2020-09-10 PROCEDURE — 160035 HCHG PACU - 1ST 60 MINS PHASE I: Performed by: INTERNAL MEDICINE

## 2020-09-10 PROCEDURE — 87281 PNEUMOCYSTIS CARINII AG IF: CPT

## 2020-09-10 PROCEDURE — 87116 MYCOBACTERIA CULTURE: CPT

## 2020-09-10 PROCEDURE — 87206 SMEAR FLUORESCENT/ACID STAI: CPT

## 2020-09-10 PROCEDURE — 160009 HCHG ANES TIME/MIN: Performed by: INTERNAL MEDICINE

## 2020-09-10 PROCEDURE — 71045 X-RAY EXAM CHEST 1 VIEW: CPT

## 2020-09-10 PROCEDURE — 700101 HCHG RX REV CODE 250: Performed by: INTERNAL MEDICINE

## 2020-09-10 PROCEDURE — 160029 HCHG SURGERY MINUTES - 1ST 30 MINS LEVEL 4: Performed by: INTERNAL MEDICINE

## 2020-09-10 PROCEDURE — 82962 GLUCOSE BLOOD TEST: CPT

## 2020-09-10 PROCEDURE — 160002 HCHG RECOVERY MINUTES (STAT): Performed by: INTERNAL MEDICINE

## 2020-09-10 PROCEDURE — 87205 SMEAR GRAM STAIN: CPT

## 2020-09-10 PROCEDURE — 160025 RECOVERY II MINUTES (STATS): Performed by: INTERNAL MEDICINE

## 2020-09-10 PROCEDURE — 503224 HCHG FORCEP, BIOPSY, SPYBITE: Performed by: INTERNAL MEDICINE

## 2020-09-10 PROCEDURE — 160047 HCHG PACU  - EA ADDL 30 MINS PHASE II: Performed by: INTERNAL MEDICINE

## 2020-09-10 PROCEDURE — 86356 MONONUCLEAR CELL ANTIGEN: CPT

## 2020-09-10 PROCEDURE — 700111 HCHG RX REV CODE 636 W/ 250 OVERRIDE (IP): Performed by: ANESTHESIOLOGY

## 2020-09-10 PROCEDURE — 88312 SPECIAL STAINS GROUP 1: CPT | Mod: 59

## 2020-09-10 PROCEDURE — 700102 HCHG RX REV CODE 250 W/ 637 OVERRIDE(OP): Performed by: INTERNAL MEDICINE

## 2020-09-10 PROCEDURE — 87015 SPECIMEN INFECT AGNT CONCNTJ: CPT

## 2020-09-10 PROCEDURE — 700105 HCHG RX REV CODE 258: Performed by: INTERNAL MEDICINE

## 2020-09-10 PROCEDURE — 89051 BODY FLUID CELL COUNT: CPT

## 2020-09-10 PROCEDURE — 160048 HCHG OR STATISTICAL LEVEL 1-5: Performed by: INTERNAL MEDICINE

## 2020-09-10 PROCEDURE — 31624 DX BRONCHOSCOPE/LAVAGE: CPT | Performed by: INTERNAL MEDICINE

## 2020-09-10 PROCEDURE — 87070 CULTURE OTHR SPECIMN AEROBIC: CPT

## 2020-09-10 PROCEDURE — 87798 DETECT AGENT NOS DNA AMP: CPT

## 2020-09-10 PROCEDURE — 88172 CYTP DX EVAL FNA 1ST EA SITE: CPT | Mod: 91

## 2020-09-10 PROCEDURE — A9270 NON-COVERED ITEM OR SERVICE: HCPCS | Performed by: INTERNAL MEDICINE

## 2020-09-10 PROCEDURE — 88305 TISSUE EXAM BY PATHOLOGIST: CPT | Mod: 59

## 2020-09-10 PROCEDURE — 160046 HCHG PACU - 1ST 60 MINS PHASE II: Performed by: INTERNAL MEDICINE

## 2020-09-10 PROCEDURE — 500066 HCHG BITE BLOCK, ECT: Performed by: INTERNAL MEDICINE

## 2020-09-10 PROCEDURE — 31653 BRONCH EBUS SAMPLNG 3/> NODE: CPT | Performed by: INTERNAL MEDICINE

## 2020-09-10 PROCEDURE — 700101 HCHG RX REV CODE 250: Performed by: ANESTHESIOLOGY

## 2020-09-10 RX ORDER — SODIUM CHLORIDE, SODIUM LACTATE, POTASSIUM CHLORIDE, CALCIUM CHLORIDE 600; 310; 30; 20 MG/100ML; MG/100ML; MG/100ML; MG/100ML
INJECTION, SOLUTION INTRAVENOUS CONTINUOUS
Status: DISCONTINUED | OUTPATIENT
Start: 2020-09-10 | End: 2020-09-10 | Stop reason: HOSPADM

## 2020-09-10 RX ORDER — DIPHENHYDRAMINE HYDROCHLORIDE 50 MG/ML
12.5 INJECTION INTRAMUSCULAR; INTRAVENOUS
Status: DISCONTINUED | OUTPATIENT
Start: 2020-09-10 | End: 2020-09-10 | Stop reason: HOSPADM

## 2020-09-10 RX ORDER — LORAZEPAM 2 MG/ML
0.5 INJECTION INTRAMUSCULAR
Status: DISCONTINUED | OUTPATIENT
Start: 2020-09-10 | End: 2020-09-10 | Stop reason: HOSPADM

## 2020-09-10 RX ORDER — MEPERIDINE HYDROCHLORIDE 25 MG/ML
6.25 INJECTION INTRAMUSCULAR; INTRAVENOUS; SUBCUTANEOUS
Status: DISCONTINUED | OUTPATIENT
Start: 2020-09-10 | End: 2020-09-10 | Stop reason: HOSPADM

## 2020-09-10 RX ORDER — LIDOCAINE HYDROCHLORIDE 20 MG/ML
INJECTION, SOLUTION EPIDURAL; INFILTRATION; INTRACAUDAL; PERINEURAL PRN
Status: DISCONTINUED | OUTPATIENT
Start: 2020-09-10 | End: 2020-09-10 | Stop reason: SURG

## 2020-09-10 RX ORDER — OXYCODONE HYDROCHLORIDE AND ACETAMINOPHEN 5; 325 MG/1; MG/1
1 TABLET ORAL
Status: DISCONTINUED | OUTPATIENT
Start: 2020-09-10 | End: 2020-09-10 | Stop reason: HOSPADM

## 2020-09-10 RX ORDER — ONDANSETRON 2 MG/ML
4 INJECTION INTRAMUSCULAR; INTRAVENOUS
Status: DISCONTINUED | OUTPATIENT
Start: 2020-09-10 | End: 2020-09-10 | Stop reason: HOSPADM

## 2020-09-10 RX ORDER — OXYCODONE HYDROCHLORIDE AND ACETAMINOPHEN 5; 325 MG/1; MG/1
2 TABLET ORAL
Status: DISCONTINUED | OUTPATIENT
Start: 2020-09-10 | End: 2020-09-10 | Stop reason: HOSPADM

## 2020-09-10 RX ORDER — HALOPERIDOL 5 MG/ML
1 INJECTION INTRAMUSCULAR
Status: DISCONTINUED | OUTPATIENT
Start: 2020-09-10 | End: 2020-09-10 | Stop reason: HOSPADM

## 2020-09-10 RX ADMIN — LIDOCAINE HYDROCHLORIDE 60 MG: 20 INJECTION, SOLUTION EPIDURAL; INFILTRATION; INTRACAUDAL; PERINEURAL at 08:18

## 2020-09-10 RX ADMIN — FENTANYL CITRATE 50 MCG: 50 INJECTION, SOLUTION INTRAMUSCULAR; INTRAVENOUS at 08:18

## 2020-09-10 RX ADMIN — ROCURONIUM BROMIDE 50 MG: 10 INJECTION INTRAVENOUS at 08:18

## 2020-09-10 RX ADMIN — PROPOFOL 200 MG: 10 INJECTION, EMULSION INTRAVENOUS at 08:18

## 2020-09-10 RX ADMIN — SODIUM CHLORIDE, POTASSIUM CHLORIDE, SODIUM LACTATE AND CALCIUM CHLORIDE 1000 ML: 600; 310; 30; 20 INJECTION, SOLUTION INTRAVENOUS at 07:26

## 2020-09-10 RX ADMIN — MIDAZOLAM HYDROCHLORIDE 2 MG: 1 INJECTION, SOLUTION INTRAMUSCULAR; INTRAVENOUS at 08:15

## 2020-09-10 RX ADMIN — LIDOCAINE HYDROCHLORIDE 0.5 ML: 10 INJECTION, SOLUTION INFILTRATION; PERINEURAL at 07:09

## 2020-09-10 RX ADMIN — POVIDONE-IODINE 15 ML: 10 SOLUTION TOPICAL at 07:09

## 2020-09-10 RX ADMIN — PROPOFOL 120 MCG/KG/MIN: 10 INJECTION, EMULSION INTRAVENOUS at 08:18

## 2020-09-10 ASSESSMENT — PAIN SCALES - GENERAL: PAIN_LEVEL: 0

## 2020-09-10 ASSESSMENT — FIBROSIS 4 INDEX: FIB4 SCORE: 0.79

## 2020-09-10 NOTE — TELEPHONE ENCOUNTER
Pt's wife called in to attempt to reschedule the appt due to Pt's wife stating that Dr. Holcomb told them the results would most likely not be ready for the appt on 09/14/2020. Let patient's wife know I would relay a message to MATTHIAS Thomas and see if it is necessary to move out the appt. Pt's wife would like us to call the Pt at [phone] to determine if Monday is okay or if it does need to be rescheduled further out.

## 2020-09-10 NOTE — ANESTHESIA POSTPROCEDURE EVALUATION
Patient: Arnulfo Cotton    Procedure Summary     Date: 09/10/20 Room / Location:  PROCEDURE ROOM / SURGERY [facility]    Anesthesia Start: 0815 Anesthesia Stop: 0934    Procedures:       BRONCHOSCOPY - FIBEROPTIC WITH POSSIBLE WASH, BRUSH, BRONCHOALVEOLAR LAVAGE, BIOPSY, FINE NEEDLE ASPIRATION (Chest)      ENDOBRONCHIAL ULTRASOUND (EBUS) Diagnosis: (MEDIASTINAL ADENOPATHY; pending pathology)    Surgeon: Naheed Holcomb M.D. Responsible Provider: Elif Thopmson M.D.    Anesthesia Type: general ASA Status: 2          Final Anesthesia Type: general  Last vitals  BP   Blood Pressure: 116/80    Temp   36.3 °C (97.3 °F)    Pulse   Pulse: 95   Resp   16    SpO2   95 %      Anesthesia Post Evaluation    Patient location during evaluation: PACU  Patient participation: complete - patient participated  Level of consciousness: awake and alert  Pain score: 0    Airway patency: patent  Anesthetic complications: no  Cardiovascular status: hemodynamically stable  Respiratory status: acceptable  Hydration status: euvolemic    PONV: none           Nurse Pain Score: 0 (NPRS)

## 2020-09-10 NOTE — ANESTHESIA TIME REPORT
Anesthesia Start and Stop Event Times     Date Time Event    9/10/2020 0804 Ready for Procedure     0815 Anesthesia Start     0934 Anesthesia Stop        Responsible Staff  09/10/20    Name Role Begin End    Elif Thompson M.D. Anesth 0815 0934        Preop Diagnosis (Free Text):  Pre-op Diagnosis     MEDIASTINAL ADENOPATHY        Preop Diagnosis (Codes):    Post op Diagnosis  Mediastinal adenopathy      Premium Reason  Non-Premium    Comments:

## 2020-09-10 NOTE — ANESTHESIA PREPROCEDURE EVALUATION
51 yo male with diabetes, mediastinal adenopathy for EBUS    Relevant Problems   ENDO   (+) Dyslipidemia associated with type 2 diabetes mellitus (HCC)       Physical Exam    Airway   Mallampati: I  TM distance: >3 FB  Neck ROM: full       Cardiovascular - normal exam  Rhythm: regular  Rate: normal  (-) murmur     Dental - normal exam           Pulmonary - normal exam  Breath sounds clear to auscultation     Abdominal - normal exam     Neurological - normal exam                 Anesthesia Plan    ASA 2       Plan - general       Airway plan will be ETT        Induction: intravenous    Postoperative Plan: Postoperative administration of opioids is intended.    Pertinent diagnostic labs and testing reviewed    Informed Consent:    Anesthetic plan and risks discussed with patient.    Use of blood products discussed with: patient whom consented to blood products.

## 2020-09-10 NOTE — ANESTHESIA PROCEDURE NOTES
Airway    Date/Time: 9/10/2020 8:19 AM  Performed by: Elif Thompson M.D.  Authorized by: Elif Thompson M.D.     Location:  OR  Urgency:  Elective  Difficult Airway: No    Indications for Airway Management:  Anesthesia      Spontaneous Ventilation: absent    Sedation Level:  Deep  Preoxygenated: Yes    Patient Position:  Sniffing  Mask Difficulty Assessment:  1 - vent by mask  Final Airway Type:  Endotracheal airway  Final Endotracheal Airway:  ETT  Cuffed: Yes    Technique Used for Successful ETT Placement:  Direct laryngoscopy    Insertion Site:  Oral  Blade Type:  Kathya  Laryngoscope Blade/Videolaryngoscope Blade Size:  3  ETT Size (mm):  8.5  Measured from:  Teeth  ETT to Teeth (cm):  21  Placement Verified by: auscultation and capnometry    Cormack-Lehane Classification:  Grade I - full view of glottis  Number of Attempts at Approach:  1  Number of Other Approaches Attempted:  0

## 2020-09-10 NOTE — DISCHARGE INSTRUCTIONS
Bronchoscopy Discharge Instructions  Home Care Instructions    ACTIVITY: Rest and take it easy for the first 24 hours.  A responsible adult is recommended to remain with you during that time.  It is normal to feel sleepy.  We encourage you to not do anything that requires balance, judgment or coordination.    The medicine you had during the bronchoscopy will make you sleepy.    FOR 24 HOURS DO NOT:  Drive, operate machinery or run household appliances.  Drink beer or alcoholic beverages.  Make important decisions or sign legal documents.  Engage in activity that requires sharp judgment and reflexes for 24 hours      Bronchoscopy is a procedure to look inside your windpipe and bronchial tubes.  An anesthetic solution is sprayed in your throat to make it numb.    You may experience a mild sore throat, hoarseness, fever up to 101?F, and /or coughing up small amounts of blood immediately following your bronchoscopy, especially if a biopsy was performed.  The discomfort should subside in 24-48 hours.    Do not smoke for 6-8 hours after the procedure to decrease your risk of coughing and /or bleeding.    Do not drink fluids or eat until your gag reflex returns, for two hours after the bronchoscopy.  Otherwise you will not feel the food or fluid in your throat, and it may go down your windpipe and cause you to choke.    Take ice chips or slowly sip cool fluids to make sure your gag reflex has returned.  Avoid hot fluids from the microwave for several hours.    After 2 hours or when you get home you may take throat lozenges or gargle with salt water if your throat is sore.  Drink liquids to help dryness in your mouth and throat.    Resume your normal activities the following day.    MEDICATIONS: Resume taking daily medication as directed by your doctor.      A follow-up appointment should be arranged with your doctor in 1 week to get the results of the bronchoscopy and any tests done during the procedure; call to  schedule.      You should CALL YOUR PHYSICIAN if you develop:  Fever greater than 101?F  You cough up more than a teaspoon of blood other than blood-tinged mucus  You have increasing amounts of bleeding from coughing after the bronchoscopy  You are wheezing  You develop any unusual signs or symptoms or have any questions                You should call 911 if you develop problems with breathing or chest pain.    If you are unable to contact your doctor or surgical center, you should go to the nearest emergency room or urgent care center.      Physician's telephone #: Dr. Holcomb [phone]      If any questions arise, call your doctor.  If your doctor is not available, please feel free to call the Surgical Center at [phone].  The Center is open Monday through Friday from 7AM to 7PM.      You can also call the [email or web address] HOTLINE open 24 hours/day, 7 days/week and speak to a nurse at [phone], or toll free at [phone].    You may receive a survey in the mail within the next two weeks and we ask that you take a few moments to complete the survey and return it to us.  Our goal is to provide you with very good care and we value your comments.

## 2020-09-10 NOTE — OR NURSING
0932 Pt arrived from OR post   BRONCHOSCOPY - FIBEROPTIC WITH POSSIBLE WASH, BRUSH, BRONCHOALVEOLAR LAVAGE, BIOPSY, FINE NEEDLE ASPIRATION     ENDOBRONCHIAL ULTRASOUND (EBUS)     , report received. Pt breathing unlabored with clear lung sounds bilat.    1005 Dr Holcomb @ BS to update Pt. Waiting on XR.     1010 XR @ BS    1030 Pt states pain is controled and tolerable, denies nausea. Pt tolerating PO fluids. Pt to phase II

## 2020-09-10 NOTE — PROCEDURES
Fiberoptic Bronchoscopy/Endotracheal Ultrasound(EBUS)    Date/Time of Procedure: 9/10/2020    Indication(s): Mediastinal adenopathy     Consent: Informed, written consent was obtained prior to the procedure; risks, benefits, & alternatives were discussed.    Universal Protocol/Time Out: A Time Out with checklist was performed prior to the procedure to ensure correct identification of the patient and procedure.    Pre-Procedure Diagnosis: Mediastinal adenopathy     Allergies: NKDA     Smoking History: Never smoker     Sedation/Analgesia/Anesthesia: Please see anesthesia note     Additional Modalities:    [_] Fluoroscopy  [_] Radial Ultrasound  [X] Linear Endobronchial Ultrasound  [X] Rapid On-Site Evaluation  [_] Other: _    Route of Entry:  [_] Nasal [_] Oral [X] ET tube [_] Trach [_] LMA     Findings: After the patient is adequately anesthetized (see procedure for anesthesia), the endobronchial ultrasound exam performed on station 10L (12mm oval lesion) and three passes were performed. lymphocytes identified. Station 4L (15 mm oval lesion) were identified and three passes performed. Lymphocytes identified. Station 7 was examined from the left shaniqua and two passes were performed. Lymphocytes present. Additional one pass was performed from the right with presence of lymphocytes. Station 10R examined and showed 12mm rectangular node. Three passes performed and there is evidence of lymphocytes but no malignant cells identified. Lymph node station 4R examined and showed  7mm irregular shape. Three passes performed and ZOFIA confirmed no malignant cell identified. Additional passes performed in 10L and 4L for flow cytometry to rule out lymphoma. Spy bite was used on lymph node station 4L to extract more lymph node tissue.       The bronchoscope was switched and passed via ETT without difficulty. The shaniqua was sharp. All subsegments of the right and left were examined. Mild petechial and old blood noted from post biopsy.  Old blood were all suctioned. No signs of active bleed noted. BAL performed in RML.     Specimens: _  [X] Bronchoalveolar Lavage (BAL): RML   [_] Wash: _  [_] Brush: _  [_] Transbronchial Needle Aspiration (TBNA): _  [_] Endobronchial Biopsy (Endo): _  [_] Transbronchial Biopsy (TBBx): _  [_] Other: _  [_] Veran Navigational Bronchoscopy:   [X] Endobronchial Ultrasound (EBUS) TBNA: 10L, 4L, 7, 4R, 10R      Estimated Blood Loss in mL: Minimum     Patient Response to Procedure: [X] Patient tolerated the procedure well. [_] Other    Complications: None     Post-Procedure Diagnosis: Mediastinal adenopathy     Post Procedure Follow Up/Comments: Scheduled to follow up with Makenna Lopez on 9/14/2020. Pending final path, BAL cx data, and flow cytometry.     Reviewed with: Dr. Freed.     Naheed Holcomb MD   Pulmonary Critical Care   [facility]

## 2020-09-10 NOTE — OR NURSING
1030: To stage ll from PACU. No pain or nausea. Awaiting discharge orders.  1153: Home care instructions reviewed w/ pt and wife. Questions, concerns addressed. Meets criteria for discharge.

## 2020-09-11 LAB
GRAM STN SPEC: NORMAL
RHODAMINE-AURAMINE STN SPEC: NORMAL
SIGNIFICANT IND 70042: NORMAL
SIGNIFICANT IND 70042: NORMAL
SITE SITE: NORMAL
SITE SITE: NORMAL
SOURCE SOURCE: NORMAL
SOURCE SOURCE: NORMAL

## 2020-09-12 LAB
% CD3 - BAL Q5871: 89 %
% CD4 - BAL Q5872: 69 %
% CD8 - BAL Q5873: 19 %
BACTERIA SPEC RESP CULT: NORMAL
CD4:CD8 RATIO - BAL Q5874: 3.63 RATIO
GRAM STN SPEC: NORMAL
P JIROVECII AG SPEC QL IF: NEGATIVE
SIGNIFICANT IND 70042: NORMAL
SITE SITE: NORMAL
SOURCE SOURCE: NORMAL
SPECIMEN SOURCE: NORMAL
SPECIMEN SOURCE: NORMAL

## 2020-09-15 ENCOUNTER — TELEPHONE (OUTPATIENT)
Dept: SLEEP MEDICINE | Facility: MEDICAL CENTER | Age: 50
End: 2020-09-15

## 2020-09-15 LAB
P JIROVECII DNA SPEC QL NAA+PROBE: NOT DETECTED
SPECIMEN SOURCE: NORMAL

## 2020-09-16 NOTE — TELEPHONE ENCOUNTER
Attempted to call patient at both numbers ([phone] and [phone]) to review post EBUS biopsy results but unable to get through. VM left for patient to call back.     Naheed Holcomb MD   Pulmonary Critical Care   [facility]

## 2020-09-17 ENCOUNTER — OFFICE VISIT (OUTPATIENT)
Dept: HEMATOLOGY ONCOLOGY | Facility: MEDICAL CENTER | Age: 50
End: 2020-09-17
Payer: COMMERCIAL

## 2020-09-17 VITALS
OXYGEN SATURATION: 97 % | WEIGHT: 247.91 LBS | HEIGHT: 70 IN | HEART RATE: 97 BPM | RESPIRATION RATE: 16 BRPM | BODY MASS INDEX: 35.49 KG/M2 | TEMPERATURE: 98.4 F | SYSTOLIC BLOOD PRESSURE: 132 MMHG | DIASTOLIC BLOOD PRESSURE: 64 MMHG

## 2020-09-17 DIAGNOSIS — R06.02 SHORTNESS OF BREATH: ICD-10-CM

## 2020-09-17 DIAGNOSIS — R05.9 COUGH: ICD-10-CM

## 2020-09-17 DIAGNOSIS — L92.9 NON-CASEATING GRANULOMA: ICD-10-CM

## 2020-09-17 DIAGNOSIS — R59.0 HILAR ADENOPATHY: ICD-10-CM

## 2020-09-17 DIAGNOSIS — R59.0 MEDIASTINAL ADENOPATHY: ICD-10-CM

## 2020-09-17 DIAGNOSIS — R94.8 ABNORMAL POSITRON EMISSION TOMOGRAPHY (PET) SCAN: ICD-10-CM

## 2020-09-17 PROCEDURE — 99213 OFFICE O/P EST LOW 20 MIN: CPT | Performed by: NURSE PRACTITIONER

## 2020-09-17 ASSESSMENT — ENCOUNTER SYMPTOMS
PALPITATIONS: 0
FEVER: 0
CHILLS: 0
SPUTUM PRODUCTION: 1
WHEEZING: 1
COUGH: 1
SHORTNESS OF BREATH: 1
HEMOPTYSIS: 0

## 2020-09-17 ASSESSMENT — FIBROSIS 4 INDEX: FIB4 SCORE: 0.79

## 2020-09-17 NOTE — PROGRESS NOTES
Subjective:      Arnulfo Cotton is a 50 y.o. male who presents for biopsy Results.          HPI    Patient seen today in follow-up for biopsy results.  Presents accompanied by his wife for today's visit.    Patient was seen back in June 2020 for an abnormal CTA of the neck which showed extensive mediastinal and right hilar lymphadenopathy which was worrisome for possible lymphoma.  Reading radiologist did note that lymphoma is favored over metastasis or sarcoidosis.  Based on those findings he was sent for a PET CT which she completed on 7/7/2020.  PET/CT did note FDG avid mediastinal and hilar adenopathy highly suspicious for lymphoma.  There was also additional enlarged periportal lymph nodes which were mildly FDG avid concerning for possible lymphoma as well.  Based on these findings patient was sent to GI for an EUS biopsy of the hilar adenopathy.  He did establish with GI and there was some delay in proceeding with the procedure due to request for pulmonary clearance.  Therefore, patient was referred to pulmonary for further evaluation and it was determined that he was a good candidate to proceed with EBUS, which was completed last Thursday, 9/10/2020.    Patient did establish care with Dr. Holcomb, and underwent EBUS.  Patient stated he tolerated the biopsy well.  He has had continued fatigue and is noticed increasing cough, sputum production, shortness of breath and wheezing.  He was given a sample of Breo and according to the wife has had improvement in his respiratory system, however still with complaints.    Pathology of all cores and slides showed no evidence of malignancy.  Patient is found to have noncaseating granulomas throughout consistent with a diagnosis of sarcoidosis.  I did personally review the results in detail with the patient and his wife today.    No Known Allergies  Current Outpatient Medications on File Prior to Visit   Medication Sig Dispense Refill   • Fluticasone Furoate-Vilanterol  "(BREO ELLIPTA) 100-25 MCG/INH AEROSOL POWDER, BREATH ACTIVATED Inhale 1 Puff by mouth every day. Rinse mouth after use. 1 Each 3   • metFORMIN (GLUCOPHAGE) 1000 MG tablet Take 1 Tab by mouth 2 times a day. 180 Tab 3   • glipiZIDE (GLUCOTROL) 5 MG Tab Take 1 Tab by mouth 2 times daily, before breakfast and dinner. 180 Tab 3   • atorvastatin (LIPITOR) 20 MG Tab Take 1 Tab by mouth every day. 90 Tab 3   • therapeutic multivitamin-minerals (THERAGRAN-M) Tab Take 1 Tab by mouth every day.     • Empagliflozin 25 MG Tab Take 1 Each by mouth every day. (Patient not taking: Reported on 9/17/2020) 30 Tab 6     No current facility-administered medications on file prior to visit.        Review of Systems   Constitutional: Positive for malaise/fatigue. Negative for chills and fever.   Respiratory: Positive for cough, sputum production, shortness of breath and wheezing. Negative for hemoptysis.    Cardiovascular: Negative for chest pain and palpitations.          Objective:     /64   Pulse 97   Temp 36.9 °C (98.4 °F) (Temporal)   Resp 16   Ht 1.778 m (5' 10\")   Wt 112.5 kg (247 lb 14.5 oz)   SpO2 97%   BMI 35.57 kg/m²      Physical Exam  Vitals signs reviewed.   Constitutional:       General: He is not in acute distress.     Appearance: Normal appearance. He is not diaphoretic.   HENT:      Head: Normocephalic and atraumatic.   Cardiovascular:      Rate and Rhythm: Normal rate and regular rhythm.      Heart sounds: Normal heart sounds. No murmur. No friction rub. No gallop.    Pulmonary:      Effort: No respiratory distress.      Breath sounds: Wheezing present.      Comments: Difficulty with deep inspiration d/t wheezing and coughing  Neurological:      Mental Status: He is alert.             PATHOLOGY  ADDENDUM:   This case is being addended to report the results of specimen K which   required additional processing before microscopic review. It shows   scant tissue with no granulomas or malignancy identified. "     Addendum Signed By:  Shaquille Freed M.D.   Addendum Date:  9/15/2020   Original Report Date:  9/11/2020     FINAL DIAGNOSIS:     A. Biopsy - slide 10L:          No malignant cells identified.          Granulomas in a background of mature reactive-appearing           lymphocytes.   B. Biopsy - core 10L:          Benign lymphoid tissue and bronchial tissue.   C. Biopsy - slide 4L:          No malignant cells identified.          Granulomas in a background of mature reactive-appearing           lymphocytes.   D. Biopsy - core 4L:          Noncaseating granulomas in a background of lymphoid cells and           tissue.          Special stains for acid-fast bacilli (AFB) and fungi (GMS) are           negative for organisms.   E. Biopsy - slide 7:          No malignant cells identified.          Granulomas in a background of mature reactive-appearing           lymphocytes.   F. Biopsy - core 7:          Noncaseating granulomas in a background of lymphoid cells and           tissue.          Special stains for acid-fast bacilli (AFB) and fungi (GMS) are           negative for organisms.   G. Biopsy - slide 4R:          No malignant cells identified.          Granulomas in a background of mature reactive-appearing           lymphocytes.   H. Biopsy - core 4R:          Noncaseating granulomas in a background of lymphoid cells and           tissue.          Special stains for acid-fast bacilli (AFB) and fungi (GMS) are           negative for organisms.   I. Biopsy - slide 10R:          No malignant cells identified.          Granulomas in a background of mature reactive-appearing           lymphocytes.   J. Biopsy - core 10R:          Scant blood and bronchial epithelium.          No tumor present.   K. RPMI - 10L and 4L:          Scant tissue with no granulomas or malignancy identified.   L. Bronchoalveolar lavage - RML:          No malignant cells identified.     COMMENT:   The diagnosis of sarcoidosis is usually one of  exclusion. But the   semi-confluent, noncaseating appearance and location of the granulomas   seems fairly characteristic. Since the histologic and cytologic   findings point to a diagnosis of sarcoidosis, no lymphoma workup was   done on the RPMI specimen K.       IMAGING  PET CT  06/24/2020    IMPRESSION:     1.  FDG avid mediastinal and hilar adenopathy is highly suspicious for lymphoma. Additional enlarged periportal lymph nodes are mildly FDG avid and concerning for lymphomatous involvement as well.      CTA-Neck  06/04/2020    IMPRESSION:     CT angiogram of the neck notable only for mild soft plaque centered at the carotid bulbs. No stenosis, aneurysm, or occlusion     Extensive mediastinal and right hilar lymphadenopathy is highly worrisome for malignancy such as lymphoma favored over metastases or sarcoidosis       Assessment/Plan:        1. Non-caseating granuloma     2. Abnormal positron emission tomography (PET) scan     3. Mediastinal adenopathy     4. Hilar adenopathy     5. Cough     6. Shortness of breath       Plan  1.  Patient status post EBUS biopsy with all cores and slides noting no evidence of malignancy.  Noncaseating granulomas were seen throughout.  Pulmonologist does note that the semi-confluent, noncaseating appearance and location of the granulomas seems fairly characteristic of a diagnosis of sarcoidosis.  There is no evidence of malignancy.  Patient was very pleased with results.    Madelyn in detail the results with patient and his wife today.  He does have a follow-up appointment with Dr. Holcomb next month along with pulmonary function studies.  I did personally speak with pulmonologist, Dr. Holcomb who does plan to do further work-up and rule out all other respiratory concerns to confirm a sarcoidosis diagnosis.  Did inform patient that he can continue with the Breo given to him by Dr. Holcomb per my conversation with Dr. Holcomb.     There is no further follow-up needed with [facility] as there is no  evidence of malignancy.  Patient to continue to be followed and managed by pulmonary.

## 2020-09-18 ENCOUNTER — TELEPHONE (OUTPATIENT)
Dept: PULMONOLOGY | Facility: HOSPICE | Age: 50
End: 2020-09-18

## 2020-09-18 NOTE — TELEPHONE ENCOUNTER
----- Message from Naheed Holcomb M.D. sent at 9/17/2020  4:01 PM PDT -----  Regarding: Labs  Jenn,    Can you send him the lab slips for these new labs I just ordered? Also, let him know that I just reoordered his breo once daily per Makenna bhakta.      Thanks,    CT

## 2020-09-18 NOTE — TELEPHONE ENCOUNTER
"Called and spoke with pt. Notified pt of this. Pt understood. Per pt's request, would like his lab orders to be mailed to his brother address. Pt provided\"  [place] [address]  Repeated address. Address correct.       Mailed lab orders to address provided under pt's name.   "

## 2020-10-07 LAB
FUNGUS SPEC CULT: NORMAL
SIGNIFICANT IND 70042: NORMAL
SITE SITE: NORMAL
SOURCE SOURCE: NORMAL

## 2020-10-14 ENCOUNTER — NON-PROVIDER VISIT (OUTPATIENT)
Dept: PULMONOLOGY | Facility: HOSPICE | Age: 50
End: 2020-10-14
Attending: INTERNAL MEDICINE
Payer: COMMERCIAL

## 2020-10-14 ENCOUNTER — OFFICE VISIT (OUTPATIENT)
Dept: PULMONOLOGY | Facility: HOSPICE | Age: 50
End: 2020-10-14
Payer: COMMERCIAL

## 2020-10-14 VITALS — BODY MASS INDEX: 35.79 KG/M2 | WEIGHT: 250 LBS | HEIGHT: 70 IN

## 2020-10-14 VITALS
HEIGHT: 70 IN | SYSTOLIC BLOOD PRESSURE: 112 MMHG | HEART RATE: 95 BPM | WEIGHT: 250 LBS | DIASTOLIC BLOOD PRESSURE: 76 MMHG | OXYGEN SATURATION: 95 % | BODY MASS INDEX: 35.79 KG/M2

## 2020-10-14 DIAGNOSIS — R93.89 ABNORMAL CT OF THE CHEST: ICD-10-CM

## 2020-10-14 DIAGNOSIS — D86.9 SARCOIDOSIS: ICD-10-CM

## 2020-10-14 DIAGNOSIS — R05.9 COUGH: ICD-10-CM

## 2020-10-14 DIAGNOSIS — R00.2 PALPITATION: ICD-10-CM

## 2020-10-14 DIAGNOSIS — R06.2 WHEEZING: ICD-10-CM

## 2020-10-14 PROCEDURE — 94729 DIFFUSING CAPACITY: CPT | Performed by: INTERNAL MEDICINE

## 2020-10-14 PROCEDURE — 94060 EVALUATION OF WHEEZING: CPT | Performed by: INTERNAL MEDICINE

## 2020-10-14 PROCEDURE — 94726 PLETHYSMOGRAPHY LUNG VOLUMES: CPT | Performed by: INTERNAL MEDICINE

## 2020-10-14 PROCEDURE — 99213 OFFICE O/P EST LOW 20 MIN: CPT | Mod: 25 | Performed by: INTERNAL MEDICINE

## 2020-10-14 RX ORDER — PREDNISONE 10 MG/1
TABLET ORAL
Qty: 42 TAB | Refills: 2 | Status: SHIPPED
Start: 2020-10-14 | End: 2020-11-05

## 2020-10-14 ASSESSMENT — PULMONARY FUNCTION TESTS
FEV1_PREDICTED: 3.99
FVC: 4.3
FEV1/FVC_PERCENT_PREDICTED: 100
FEV1_LLN: 3.33
FEV1_PERCENT_PREDICTED: 89
FEV1/FVC_PREDICTED: 79
FEV1/FVC: 80
FVC_LLN: 4.21
FEV1/FVC: 80
FEV1: 3.55
FEV1_PERCENT_CHANGE: 7
FEV1/FVC_PERCENT_CHANGE: 157
FVC_PREDICTED: 5.05
FVC_PERCENT_PREDICTED: 78
FVC: 3.98
FVC_PERCENT_PREDICTED: 85
FEV1/FVC: 82.56
FEV1/FVC_PERCENT_PREDICTED: 105
FEV1/FVC_PERCENT_PREDICTED: 101
FEV1: 3.19
FEV1/FVC_PERCENT_LLN: 66
FEV1/FVC_PERCENT_PREDICTED: 79
FEV1/FVC: 83
FEV1_PERCENT_CHANGE: 11
FEV1/FVC_PERCENT_CHANGE: 3
FEV1_PERCENT_PREDICTED: 79
FEV1/FVC_PERCENT_PREDICTED: 104

## 2020-10-14 ASSESSMENT — FIBROSIS 4 INDEX
FIB4 SCORE: 0.79
FIB4 SCORE: 0.79

## 2020-10-14 NOTE — PROGRESS NOTES
Chief Complaint   Patient presents with   • Follow-Up     Mediastinal Adenopathy. Last seen 08/28/20   • Results     PFT 10/14/20, Bronch 09/10, CXR 09/10   • Medication Management     Trial: Breo. Per pt it hurt his throat right after.        Problems:   1. Sarcoidosis    2. Palpitation    3. Abnormal CT of the chest        Assessment/Plan:   # Pulmonary sarcoidosis   -- Confirmed on EBUS w/ TBNA showing non-caseating granuloma with an elevated CD4:CD8 ratio and no presence of AFB or fungal.    -- Pending cocci ab, quantiferon, ace level, and ESR    -- Review of PET scan showed mediastinal adenopathy (3A, 4R, 4L, 7, 10R, 10L) with significant avid uptake and bilateral lower lobe reticular change concerning for early sarcoidosis related ILD    -- Will start prednisone 30 mg with a 10 mg weekly taper X 3 weeks   -- Repeat CT chest in 6-8 weeks after completion of steroid    -- Referral to ophthalmology to rule out ocular involvement associated with sarcoidosis    -- Check echocardiogram     # Palpitations    -- Ddx includes arrhythmias vs cardiac sarcoidosis  -- Referral to cardiology   -- Check echocardiogram     # Cough    -- Secondary to sarcoidosis and airway reactive disease   -- PFTs showed modest improvement with bronchodilator and no evidence of interstitial lung disease    -- Sarcoidosis rx as above    -- Cont breo once daily   -- Discussed about the importance of using face mask while at work to prevent environmental exposure.     RTC 8-12 weeks after CT chest     HPI:  Arnulfo Cotton is a 50 year old male with history of DM and hyperlipidemia referred to the pulmonary clinic for mediastinal adenopathy evaluation. Patient report having a headache associated with N/V which prompted him to be seen in the ER. He was workup for stroke and CTA neck showed extensive mediastinal adenopathy which he was referred to Makenna Lopez for further evaluation. Patient underwent PET scan showed avid uptake in  mediastinal and hilar.      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 Family history significant for CAD but not sure about cancer. Never had colonoscopy.     Interval Follow Up Visit 10/14/2020:  Presents for follow up. Underwent EBUS with TBNA and path positive for granumloma. Fungal and AFB cx negative to date. Pending cocci and TB testing. PFTs today showed no evidence of airflow obstruction with modest improvement to bronchodilator.    Subjectively, he reports feeling better overall. Cough is intermittent and productive in nature. Associated with chest tightness and intermittent palpitations. Denies fever/chills, orthopnea, LE swelling, nasal congestion, or recent sick contact. Uses albuterol about once daily.     Past Medical History:   Diagnosis Date   • Chest pain    • Chest tightness    • Cough    • Daytime sleepiness    • Dental disorder     Upper dentures, full   • Difficulty breathing    • Fainting    • Gasping for breath    • Morning headache    • Palpitations    • Pneumonia 2017   • Productive cough    • Shortness of breath 04/2020    with and without exertion   • Sputum production    • Type II or unspecified type diabetes mellitus without mention of complication, not stated as uncontrolled    • Wheezing        Past Surgical History:   Procedure Laterality Date   • PB BRONCHOSCOPY,DIAGNOSTIC  9/10/2020    Procedure: BRONCHOSCOPY - FIBEROPTIC WITH POSSIBLE WASH, BRUSH, BRONCHOALVEOLAR LAVAGE, BIOPSY, FINE NEEDLE ASPIRATION;  Surgeon: Naheed Holcomb M.D.;  Location: SURGERY [facility];  Service: Ent   • ENDOBRONCHIAL US ADD-ON  9/10/2020    Procedure: ENDOBRONCHIAL ULTRASOUND (EBUS);  Surgeon: Naheed Holcomb M.D.;  Location: SURGERY [facility];  Service: Ent   • MASS EXCISION GENERAL  1976, 1987    Jaw mass/ under chin mass       ROS:   Constitutional: Denies fevers, chills, night sweats, fatigue or weight loss  Eyes: Denies vision loss, pain,  drainage, double vision  Ears, Nose, Throat: Denies earache, tinnitus, hoarseness  Cardiovascular: Denies chest pain, tightness, palpitations  Respiratory: See HPI  GI: Denies abdominal pain, nausea, vomiting, diarrhea  : Denies frequent urination, hematuria, painful urination  Musculoskeletal: Denies back pain, painful joints, sore muscles  Neurological: Denies headaches, seizures  Skin: Denies rashes, color changes  Psychiatric: Denies depression or thoughts of suicide  Hematologic: Denies bleeding tendency or clotting tendency  Allergic/Immunologic: Denies rhinitis, skin sensitivity    Social History     Socioeconomic History   • Marital status:      Spouse name: Not on file   • Number of children: Not on file   • Years of education: Not on file   • Highest education level: Not on file   Occupational History   • Not on file   Social Needs   • Financial resource strain: Not on file   • Food insecurity     Worry: Not on file     Inability: Not on file   • Transportation needs     Medical: Not on file     Non-medical: Not on file   Tobacco Use   • Smoking status: Passive Smoke Exposure - Never Smoker   • Smokeless tobacco: Former User     Types: Chew   Substance and Sexual Activity   • Alcohol use: No     Alcohol/week: 0.0 oz     Frequency: Never     Binge frequency: Never   • Drug use: No   • Sexual activity: Yes     Partners: Female   Lifestyle   • Physical activity     Days per week: Not on file     Minutes per session: Not on file   • Stress: Not on file   Relationships   • Social connections     Talks on phone: Not on file     Gets together: Not on file     Attends [employer] service: Not on file     Active member of club or organization: Not on file     Attends meetings of clubs or organizations: Not on file     Relationship status: Not on file   • Intimate partner violence     Fear of current or ex partner: Not on file     Emotionally abused: Not on file     Physically abused: Not on file     Forced  "sexual activity: Not on file   Other Topics Concern   • Not on file   Social History Narrative   • Not on file     Patient has no known allergies.  Current Outpatient Medications on File Prior to Visit   Medication Sig Dispense Refill   • Fluticasone Furoate-Vilanterol (BREO ELLIPTA) 100-25 MCG/INH AEROSOL POWDER, BREATH ACTIVATED Inhale 1 Puff by mouth every day. Rinse mouth after use. 1 Each 3   • metFORMIN (GLUCOPHAGE) 1000 MG tablet Take 1 Tab by mouth 2 times a day. 180 Tab 3   • glipiZIDE (GLUCOTROL) 5 MG Tab Take 1 Tab by mouth 2 times daily, before breakfast and dinner. 180 Tab 3   • therapeutic multivitamin-minerals (THERAGRAN-M) Tab Take 1 Tab by mouth every day.       No current facility-administered medications on file prior to visit.      /76 (BP Location: Left arm, Patient Position: Sitting, BP Cuff Size: Adult)   Pulse 95   Ht 1.778 m (5' 10\")   Wt 113.4 kg (250 lb)   SpO2 95%   Family History   Problem Relation Age of Onset   • Diabetes Mother    • Hypertension Mother    • Diabetes Father    • Other Sister         2019 dx sarcoidosis of lungs   • Cancer Paternal Uncle         melanoma   • Diabetes Maternal Grandmother    • Cancer Maternal Grandfather         stomach   • Diabetes Paternal Grandfather    • Cancer Maternal Aunt         cancer unknown   • Heart Disease Neg Hx    • Stroke Neg Hx      Immunization History   Administered Date(s) Administered   • Influenza Vaccine Quad Inj (Pf) 03/02/2017, 12/16/2019   • Influenza Vaccine Quad Inj (Preserved) 10/14/2015   • Pneumococcal polysaccharide vaccine (PPSV-23) 10/14/2015   • Tdap Vaccine 10/06/2015   • Tuberculin Skin Test 08/17/2015         Physical Exam:  General: Obese, NAD.  HEENT: PERRLA, EOMI, no scleral icterus, no nasal or oral lesions  Neck: No thyromegaly, no adenopathy, no bruits  Mallampatti: Grade III  Lungs: Equal breath sounds, no wheezes or crackles  Heart: Regular rate and rhythm, no gallops or murmurs  Abdomen: Soft, " benign, no organomegaly  Extremities: No clubbing, cyanosis, or edema  Neurologic: Cranial nerve, motor, and sensory exam are normal    Naheed Holcomb MD   Pulmonary Critical Care   [facility]

## 2020-10-14 NOTE — PROCEDURES
Technician: Alexa Lorenzo RRT   Good patient effort & cooperation.  The results of this test meet the ATS/ERS standards for acceptability & reproducibility.  Test was performed on the [employer] Body Plethysmograph-Elite DX system.  Predicted equations for Spirometry are GLI-2012, ITS for lung volumes, and GLI-2017 for DLCO.  The DLCO was uncorrected for Hgb.  A bronchodilator of Ventolin HFA -2puffs via spacer administered.  DLCO performed during dilation period.    Interpretation:  This exam is performed with a primary diagnosis of cough to help establish a pre-test probability of the patient’s diagnostic findings.    The Flow Volume Loop is of sufficient quality and the volume-time graph demonstrates an adequate exhalation to provide a confident interpretation.    The FEV1/FVC ratio is normal by GOLD criteria and confidence interval data indicating an absence of irreversible obstructive lung disease (i.e. no evidence of COPD).  This is accompanied by a normal FEV1 and FVC based on predicted values. The patient was administered a bronchodilator challenge and demonstrated an increase in FVC by 320 mL with an increase in FEV1 by 360 mL.  Given the percent and absolute volume change in these values, the response is considered moderate unambiguously positive despite the absolute change in percent but should be correlated clinically for subjective response.     Lung volumes are within the limits of predicted values effectively ruling out restrictive lung disease. The DLCO is normal.    Impression:    1. No evidence of airflow obstruction   2. Modest response to bronchodilator suggestive of airway reactive disease. Clinical correlation recommended.

## 2020-10-20 DIAGNOSIS — R59.0 MEDIASTINAL ADENOPATHY: ICD-10-CM

## 2020-10-21 ENCOUNTER — TELEPHONE (OUTPATIENT)
Dept: PULMONOLOGY | Facility: HOSPICE | Age: 50
End: 2020-10-21

## 2020-10-21 NOTE — TELEPHONE ENCOUNTER
Naheed Holcomb M.D.  You 37 minutes ago (3:45 PM)     Voicemail left to call back for labs update.     - CT

## 2020-10-21 NOTE — TELEPHONE ENCOUNTER
Caller: Arnulfo    Phone Number: [phone] (home)     Message: Pt called and l/m. RE: Test.           Pt labs are completed. (In Media)

## 2020-10-23 NOTE — TELEPHONE ENCOUNTER
Called and updated patient about his lab results. Advised him to start prednisone therapy as instructed and to get a repeat CT chest in 4-6 weeks. Patient verbalized understanding and agree with treatment plan.     Naheed Holcomb MD   Pulmonary Critical Care   [facility]

## 2020-10-24 DIAGNOSIS — R39.12 WEAK URINARY STREAM: ICD-10-CM

## 2020-10-25 RX ORDER — TAMSULOSIN HYDROCHLORIDE 0.4 MG/1
CAPSULE ORAL
Qty: 30 CAP | Refills: 0 | Status: SHIPPED | OUTPATIENT
Start: 2020-10-25 | End: 2021-02-01 | Stop reason: SDUPTHER

## 2020-11-04 ENCOUNTER — APPOINTMENT (OUTPATIENT)
Dept: CARDIOLOGY | Facility: IMAGING CENTER | Age: 50
End: 2020-11-04
Attending: INTERNAL MEDICINE
Payer: COMMERCIAL

## 2020-11-05 ENCOUNTER — OFFICE VISIT (OUTPATIENT)
Dept: CARDIOLOGY | Facility: MEDICAL CENTER | Age: 50
End: 2020-11-05
Payer: COMMERCIAL

## 2020-11-05 VITALS
HEART RATE: 96 BPM | HEIGHT: 70 IN | OXYGEN SATURATION: 99 % | DIASTOLIC BLOOD PRESSURE: 86 MMHG | WEIGHT: 250 LBS | SYSTOLIC BLOOD PRESSURE: 130 MMHG | BODY MASS INDEX: 35.79 KG/M2

## 2020-11-05 DIAGNOSIS — R07.2 PRECORDIAL CHEST PAIN: ICD-10-CM

## 2020-11-05 DIAGNOSIS — E66.9 OBESITY (BMI 30-39.9): ICD-10-CM

## 2020-11-05 DIAGNOSIS — E78.1 HYPERTRIGLYCERIDEMIA: Chronic | ICD-10-CM

## 2020-11-05 DIAGNOSIS — E78.5 DYSLIPIDEMIA: Chronic | ICD-10-CM

## 2020-11-05 DIAGNOSIS — D86.0 PULMONARY SARCOIDOSIS (HCC): Chronic | ICD-10-CM

## 2020-11-05 PROCEDURE — 99214 OFFICE O/P EST MOD 30 MIN: CPT | Performed by: INTERNAL MEDICINE

## 2020-11-05 RX ORDER — EMPAGLIFLOZIN 25 MG/1
TABLET, FILM COATED ORAL DAILY
COMMUNITY
Start: 2020-10-24 | End: 2020-12-22 | Stop reason: SDUPTHER

## 2020-11-05 RX ORDER — ATORVASTATIN CALCIUM 20 MG/1
20 TABLET, FILM COATED ORAL DAILY
Qty: 90 TAB | Refills: 3 | Status: SHIPPED | OUTPATIENT
Start: 2020-11-05 | End: 2020-12-22 | Stop reason: SDUPTHER

## 2020-11-05 ASSESSMENT — ENCOUNTER SYMPTOMS
CONSTIPATION: 1
NAUSEA: 0
BRUISES/BLEEDS EASILY: 0
CLAUDICATION: 0
COUGH: 1
SHORTNESS OF BREATH: 1
FEVER: 0
CHILLS: 0
ABDOMINAL PAIN: 0
BACK PAIN: 1
POLYDIPSIA: 1
MYALGIAS: 1
WHEEZING: 1
NECK PAIN: 1
FALLS: 0
PND: 0
PALPITATIONS: 0
WEIGHT LOSS: 1
FOCAL WEAKNESS: 0
WEAKNESS: 0
SORE THROAT: 0
SPUTUM PRODUCTION: 1
DIZZINESS: 0
BLURRED VISION: 0

## 2020-11-05 ASSESSMENT — FIBROSIS 4 INDEX: FIB4 SCORE: 0.79

## 2020-11-05 NOTE — PATIENT INSTRUCTIONS
Work on at least 1.5 - 5 hours a week of moderate exercise (typical brisk walking or similar activity)    Please look into the following diets and incorporate them into your diet    LOW SALT DIET   KEEP YOUR SODIUM EQUAL TO CALORIES AND NO MORE THAN DOUBLE THE CALORIES FOR A LOW SALT DIET    Cardiosmart.org - great resource for American College of Cardiology on heart disease prevention and treatment    FOR TREATMENT OR PREVENTION OF CORONARY ARTERY DISEASE  These three programs are approved by Medicare/Insurers for those with heart disease  [place] - [facility]  Dr. Dominguez's Program for Reversing Heart Disease - Edwin Lynch's Cardiologist vegetarian-based  [facility] - [place]-based mind-body Program    This is a commonly referenced Program  Dr Babcock - [employer] (book and documentary) - vegetarian-based    FOR TREATMENT OF BLOOD PRESSURE  DASH DIET - American Heart Association for treatment of HYPERTENSION    FOR TREATMENT OF BAD CHOLESTEROL/FATS  REDUCE PROCESSED SUGAR AS MUCH AS POSSIBLE  INCREASE WHOLE GRAINS/VEGETABLES    Lowering total cholesterol and LDL (bad) cholesterol:  - Eat leaner cuts of meat, or eliminate altogether if possible red meat, and frequently substitute fish or chicken.  - Limit saturated fat to no more than 7-10% of total calories no more than 10 g per day is recommended. Some sources of saturated fat include butter, animal fats, hydrogenated vegetable fats and oils, many desserts, whole milk dairy products.  - Replaced saturated fats with polyunsaturated fats and monounsaturated fats. Foods high in monounsaturated fat include nuts, although well, canola oil, avocados, and olives.  - Limit trans fat (processed foods) and replaced with fresh fruits and vegetables  - Recommend nonfat dairy products  - Increase substantially the amount of soluble fiber intake (legumes such as beans, fruit, whole grains).  - Consider  nutritional supplements: plant sterile spreads such as Benecol, fish oil,  flaxseed oil, omega-3 acids capsules 1000 mg twice a day, or viscous fiber such as Metamucil  - Attain ideal weight and regular exercise (at least 30 minutes per day of walking)    Lowering triglycerides:  - Reduce intake of simple sugar: Desserts, candy, pastries, honey, sodas, sugared cereals, yogurt, Gatorade, sports bars, canned fruit, smoothies, fruit juice, coffee drinks  - Reduced intake of refined starches: Refined Pasta, most bread  - Reduce or abstain from alcohol  - Increase omega-3 fatty acids: [place], Trout, Mackerel, Herring, [place] tuna and supplements  - Attain ideal weight and regular exercise (at least 30 minutes per day of walking)    Elevating HDL (good) cholesterol:  - Increase physical activity  - Increase omega-3 fatty acids and supplements as listed above  - Incorporating appropriate amounts of monounsaturated fats such as nuts, olive oil, canola oil, avocados, olives  - Stop smoking  - Attain ideal weight and regular exercise (at least 30 minutes per day of walking)    We discussed that you should talk with primary care (or endocrinology) about oral medication: empagliflozin (JARDIANCE®  Preferred), dapagliflozin (FARXIGA®), or Canagliflozin (Invokana® [CONCERN FOR FOOT AMPUTATION]); there are cardiovascular benefits but there are also risks.  You may also want to consider liraglutide (Victoza®) which is an injection with cardiovascular benefits but also risks.

## 2020-11-05 NOTE — PROGRESS NOTES
Chief Complaint   Patient presents with   • Palpitations       Subjective:   Arnulfo Cotton is a 50 y.o. male who presents today in consultation from Naheed Holcomb for evaluation of abnormal EKG with palpitations and pulmonary sarcoidosis.  He has had prior cardiac testing in the form of an echocardiogram and pharmacologic stress test in 2016 both were reported as normal.  He had described to pulmonary that he gets a sense of chest tightness achiness up to a day at a time not associated with exercise not associated with palpitations.  He is lost significant weight on extensive diabetes regimen but still has very high A1c  He works as a  and has a CDL        Past Medical History:   Diagnosis Date   • Chest pain    • Chest tightness    • Cough    • Daytime sleepiness    • Dental disorder     Upper dentures, full   • Difficulty breathing    • Fainting    • Gasping for breath    • Morning headache    • Palpitations    • Pneumonia 2017   • Productive cough    • Shortness of breath 04/2020    with and without exertion   • Sputum production    • Type II or unspecified type diabetes mellitus without mention of complication, not stated as uncontrolled    • Wheezing      Past Surgical History:   Procedure Laterality Date   • PB BRONCHOSCOPY,DIAGNOSTIC  9/10/2020    Procedure: BRONCHOSCOPY - FIBEROPTIC WITH POSSIBLE WASH, BRUSH, BRONCHOALVEOLAR LAVAGE, BIOPSY, FINE NEEDLE ASPIRATION;  Surgeon: Naheed Holcomb M.D.;  Location: [facility];  Service: Ent   • ENDOBRONCHIAL US ADD-ON  9/10/2020    Procedure: ENDOBRONCHIAL ULTRASOUND (EBUS);  Surgeon: Naheed Holcomb M.D.;  Location: [facility];  Service: Ent   • MASS EXCISION GENERAL  1976, 1987    Jaw mass/ under chin mass     Family History   Problem Relation Age of Onset   • Diabetes Mother    • Hypertension Mother    • Diabetes Father    • Other Sister         2019 dx sarcoidosis of lungs   • Cancer Paternal Uncle         melanoma   • Diabetes  Diarrhea:  We recommend imodium AD (available over the counter) 2 tablets at the first onset of diarrhea. Then take 1 tablet after every loose stool.       Maternal Grandmother    • Cancer Maternal Grandfather         stomach   • Diabetes Paternal Grandfather    • Cancer Maternal Aunt         cancer unknown   • Heart Disease Neg Hx    • Stroke Neg Hx      Social History     Socioeconomic History   • Marital status:      Spouse name: Not on file   • Number of children: Not on file   • Years of education: Not on file   • Highest education level: Not on file   Occupational History   • Not on file   Social Needs   • Financial resource strain: Not on file   • Food insecurity     Worry: Not on file     Inability: Not on file   • Transportation needs     Medical: Not on file     Non-medical: Not on file   Tobacco Use   • Smoking status: Passive Smoke Exposure - Never Smoker   • Smokeless tobacco: Former User     Types: Chew   Substance and Sexual Activity   • Alcohol use: No     Alcohol/week: 0.0 oz     Frequency: Never     Binge frequency: Never   • Drug use: No   • Sexual activity: Yes     Partners: Female   Lifestyle   • Physical activity     Days per week: Not on file     Minutes per session: Not on file   • Stress: Not on file   Relationships   • Social connections     Talks on phone: Not on file     Gets together: Not on file     Attends Shinto service: Not on file     Active member of club or organization: Not on file     Attends meetings of clubs or organizations: Not on file     Relationship status: Not on file   • Intimate partner violence     Fear of current or ex partner: Not on file     Emotionally abused: Not on file     Physically abused: Not on file     Forced sexual activity: Not on file   Other Topics Concern   • Not on file   Social History Narrative   • Not on file     No Known Allergies  Outpatient Encounter Medications as of 11/5/2020   Medication Sig Dispense Refill   • JARDIANCE 25 MG Tab every day.     • tamsulosin (FLOMAX) 0.4 MG capsule TAKE ONE CAPSULE BY MOUTH DAILY 1/2 HOUR AFTER BREAKFAST 30 Cap 0   • Fluticasone Furoate-Vilanterol  "(BREO ELLIPTA) 100-25 MCG/INH AEROSOL POWDER, BREATH ACTIVATED Inhale 1 Puff by mouth every day. Rinse mouth after use. 1 Each 3   • metFORMIN (GLUCOPHAGE) 1000 MG tablet Take 1 Tab by mouth 2 times a day. 180 Tab 3   • glipiZIDE (GLUCOTROL) 5 MG Tab Take 1 Tab by mouth 2 times daily, before breakfast and dinner. 180 Tab 3   • therapeutic multivitamin-minerals (THERAGRAN-M) Tab Take 1 Tab by mouth every day.     • [DISCONTINUED] predniSONE (DELTASONE) 10 MG Tab Take 30mg x 7 days, then take 20mg x 7 days, then take 10mg x 7 days, with food, then discontinue. 42 Tab 2     No facility-administered encounter medications on file as of 11/5/2020.      Review of Systems   Constitutional: Positive for weight loss. Negative for chills and fever.   HENT: Negative for sore throat.    Eyes: Negative for blurred vision.   Respiratory: Positive for cough, sputum production, shortness of breath and wheezing.    Cardiovascular: Negative for chest pain, palpitations, claudication, leg swelling and PND.   Gastrointestinal: Positive for constipation. Negative for abdominal pain and nausea.   Musculoskeletal: Positive for back pain, joint pain, myalgias and neck pain. Negative for falls.   Skin: Negative for rash.   Neurological: Negative for dizziness, focal weakness and weakness.   Endo/Heme/Allergies: Positive for polydipsia. Does not bruise/bleed easily.        Objective:   /86 (BP Location: Right arm, Patient Position: Sitting)   Pulse 96   Ht 1.778 m (5' 10\")   Wt 113.4 kg (250 lb)   SpO2 99%   BMI 35.87 kg/m²     Physical Exam   Constitutional: No distress.   HENT:   Patient wearing a mask due to COVID precautions   Eyes: No scleral icterus.   Neck: No JVD present.   Cardiovascular: Normal rate and normal heart sounds. Exam reveals no gallop and no friction rub.   No murmur heard.  Pulmonary/Chest: No respiratory distress. He has no wheezes. He has no rales.   Abdominal: Soft. Bowel sounds are normal. "   Musculoskeletal:         General: No edema.   Neurological: He is alert.   Skin: No rash noted. He is not diaphoretic.   Psychiatric: He has a normal mood and affect.     We reviewed in person the most recent labs  Recent Results (from the past 4032 hour(s))   EKG    Collection Time: 20 12:25 PM   Result Value Ref Range    Report       [facility]    Test Date:  2020  Pt Name:    CHRISTIAN KAM               Department: [facility]  MRN:        [id number]                      [facility]  Gender:     Male                         Technician: [id number]  :        1970                   Requested By:ER TRIAGE PROTOCOL  Order #:    [id number]                    Reading MD: CHERRIE DON D.O.    Measurements  Intervals                                Axis  Rate:       100                          P:          60  DC:         153                          QRS:        36  QRSD:       77                           T:          17  QT:         339  QTc:        438    Interpretive Statements  Sinus tachycardia  Probable left atrial enlargement  Compared to ECG 2017 00:19:46  Sinus rhythm no longer present  Electronically Signed On 2020 15:23:53 PDT by CHERRIE DON D.O.     CBC WITH DIFFERENTIAL    Collection Time: 20 12:42 PM   Result Value Ref Range    WBC 6.3 4.8 - 10.8 K/uL    RBC 5.65 4.70 - 6.10 M/uL    Hemoglobin 16.5 14.0 - 18.0 g/dL    Hematocrit 48.0 42.0 - 52.0 %    MCV 85.0 81.4 - 97.8 fL    MCH 29.2 27.0 - 33.0 pg    MCHC 34.4 33.7 - 35.3 g/dL    RDW 41.5 35.9 - 50.0 fL    Platelet Count 250 164 - 446 K/uL    MPV 10.0 9.0 - 12.9 fL    Neutrophils-Polys 69.50 44.00 - 72.00 %    Lymphocytes 18.10 (L) 22.00 - 41.00 %    Monocytes 8.70 0.00 - 13.40 %    Eosinophils 3.00 0.00 - 6.90 %    Basophils 0.50 0.00 - 1.80 %    Immature Granulocytes 0.20 0.00 - 0.90 %    Nucleated RBC 0.00 /100 WBC    Neutrophils (Absolute) 4.39 1.82 - 7.42 K/uL     Lymphs (Absolute) 1.14 1.00 - 4.80 K/uL    Monos (Absolute) 0.55 0.00 - 0.85 K/uL    Eos (Absolute) 0.19 0.00 - 0.51 K/uL    Baso (Absolute) 0.03 0.00 - 0.12 K/uL    Immature Granulocytes (abs) 0.01 0.00 - 0.11 K/uL    NRBC (Absolute) 0.00 K/uL   COMP METABOLIC PANEL    Collection Time: 20 12:42 PM   Result Value Ref Range    Sodium 141 135 - 145 mmol/L    Potassium 4.1 3.6 - 5.5 mmol/L    Chloride 103 96 - 112 mmol/L    Co2 21 20 - 33 mmol/L    Anion Gap 17.0 (H) 7.0 - 16.0    Glucose 348 (H) 65 - 99 mg/dL    Bun 13 8 - 22 mg/dL    Creatinine 0.74 0.50 - 1.40 mg/dL    Calcium 9.9 8.4 - 10.2 mg/dL    AST(SGOT) 21 12 - 45 U/L    ALT(SGPT) 26 2 - 50 U/L    Alkaline Phosphatase 116 (H) 30 - 99 U/L    Total Bilirubin 0.7 0.1 - 1.5 mg/dL    Albumin 4.2 3.2 - 4.9 g/dL    Total Protein 7.0 6.0 - 8.2 g/dL    Globulin 2.8 1.9 - 3.5 g/dL    A-G Ratio 1.5 g/dL   PROTHROMBIN TIME    Collection Time: 20 12:42 PM   Result Value Ref Range    PT 13.0 12.0 - 14.6 sec    INR 0.97 0.87 - 1.13   APTT    Collection Time: 20 12:42 PM   Result Value Ref Range    APTT 30.5 24.7 - 36.0 sec   TROPONIN    Collection Time: 20 12:42 PM   Result Value Ref Range    Troponin T 9 6 - 19 ng/L   ESTIMATED GFR    Collection Time: 20 12:42 PM   Result Value Ref Range    GFR If African American >60 >60 mL/min/1.73 m 2    GFR If Non African American >60 >60 mL/min/1.73 m 2   POCT  A1C    Collection Time: 20 10:53 AM   Result Value Ref Range    Glycohemoglobin 12.4 (A) 0.0 - 5.6 %    Internal Control Negative Negative     Internal Control Positive Positive    EKG    Collection Time: 20 12:29 PM   Result Value Ref Range    Report       [facility]    Test Date:  2020  Pt Name:    CHRISTIAN KAM               Department: EKG  MRN:        [id number]                      Room:  Gender:     Male                         Technician: [facility]  :        1970                   Requested By:MICHAEL Smith  #:    [id number]                    Reading MD: Milton Estrada MD    Measurements  Intervals                                Axis  Rate:       94                           P:          31  NC:         146                          QRS:        48  QRSD:       103                          T:          47  QT:         351  QTc:        439    Interpretive Statements  SINUS RHYTHM  MINIMAL ST DEPRESSION, LATERAL LEADS  Compared to ECG 06/04/2020 12:25:07  ST (T wave) deviation now present  Sinus tachycardia no longer present  Electronically Signed On 8- 17:29:44 PDT by Milton Estrada MD     Basic Metabolic Panel    Collection Time: 09/08/20  9:18 AM   Result Value Ref Range    Sodium 141 135 - 145 mmol/L    Potassium 4.0 3.6 - 5.5 mmol/L    Chloride 101 96 - 112 mmol/L    Co2 25 20 - 33 mmol/L    Glucose 339 (H) 65 - 99 mg/dL    Bun 19 8 - 22 mg/dL    Creatinine 0.75 0.50 - 1.40 mg/dL    Calcium 10.0 8.4 - 10.2 mg/dL    Anion Gap 15.0 7.0 - 16.0   ESTIMATED GFR    Collection Time: 09/08/20  9:18 AM   Result Value Ref Range    GFR If African American >60 >60 mL/min/1.73 m 2    GFR If Non African American >60 >60 mL/min/1.73 m 2   COVID/SARS CoV-2    Collection Time: 09/08/20  9:39 AM    Specimen: Nasal; Respirate   Result Value Ref Range    COVID Order Status Received    SARS-CoV-2, PCR (In-House)    Collection Time: 09/08/20  9:39 AM   Result Value Ref Range    SARS-CoV-2 Source Nasal Swab     SARS-CoV-2 by PCR NotDetected    ACCU-CHEK GLUCOSE    Collection Time: 09/10/20  7:20 AM   Result Value Ref Range    Glucose - Accu-Ck 194 (H) 65 - 99 mg/dL   Fluid Cell Count    Collection Time: 09/10/20  9:22 AM   Result Value Ref Range    Fluid Type BAL     Color-Body Fluid Red     Character-Body Fluid Bloody     Total RBC Count See Comment cells/uL    Total WBC See Comment cells/uL    Polys 78 %    Lymphs 6 %    Mononuclear Cells - Fluid 16 %   CULTURE RESPIRATORY W/ GRM STN    Collection Time: 09/10/20  9:22 AM     Specimen: Respirate   Result Value Ref Range    Significant Indicator NEG     Source RESP     Site Bronchoalveolar Lavage RML     Culture Result       Rare growth usual upper respiratory crescencio  No clinically significant Staphylococcus aureus, Methicillin  Resistant Staphylococcus aureus, or Pseudomonas species  isolated.      Gram Stain Result Few WBCs.  No organisms seen.      Fungal Culture    Collection Time: 09/10/20  9:22 AM    Specimen: Respirate   Result Value Ref Range    Significant Indicator NEG     Source RESP     Site Bronchoalveolar Lavage RML     Culture Result No fungal growth.    AFB Culture    Collection Time: 09/10/20  9:22 AM    Specimen: Respirate   Result Value Ref Range    Significant Indicator NEG     Source RESP     Site Bronchoalveolar Lavage RML     Culture Result Culture in progress.     AFB Smear Results No acid fast bacilli seen.    Lymphocyte Subset Panel 4, Bronchoalveolar Lavage    Collection Time: 09/10/20  9:22 AM   Result Value Ref Range    % CD3 - BAL 89 %    % CD4 - BAL 69 %    % CD8 - BAL 19 %    CD4:CD8 Ratio - BAL 3.63 ratio    Specimen Source BAL    P. JIROVECII DFA RFLX PCR    Collection Time: 09/10/20  9:22 AM   Result Value Ref Range    Pneumocystis source BAL     P. jirovecii by DFA Negative    GRAM STAIN    Collection Time: 09/10/20  9:22 AM    Specimen: Respirate   Result Value Ref Range    Significant Indicator .     Source RESP     Site Bronchoalveolar Lavage RML     Gram Stain Result Few WBCs.  No organisms seen.      Acid Fast Stain    Collection Time: 09/10/20  9:22 AM    Specimen: Respirate   Result Value Ref Range    Significant Indicator NEG     Source RESP     Site Bronchoalveolar Lavage RML     AFB Smear Results No acid fast bacilli seen.    P. JIROVECII PCR    Collection Time: 09/10/20  9:22 AM   Result Value Ref Range    P. jirovecii by PCR Not Detected     Pneumocystis source BAL    Cytology - BAL    Collection Time: 09/10/20  9:40 AM   Result Value Ref  Range    Cytology Reg See Path Report    Histology Request    Collection Time: 09/10/20 10:59 AM   Result Value Ref Range    Pathology Request Sent to Histo    Cytology    Collection Time: 09/10/20 11:08 AM   Result Value Ref Range    Cytology Reg See Path Report        We reviewed the images of his echocardiogram from 2017 which was normal reviewed the images of the CT scan which was including his PET scan this year which shows no coronary artery calcifications  Assessment:     1. Dyslipidemia     2. Obesity (BMI 30-39.9)     3. Pulmonary sarcoidosis (HCC)         Medical Decision Making:  Today's Assessment / Status / Plan:     It was my pleasure to meet with Mr. Cotton.    Blood pressure is well controlled.  We specifically assessed the labs on hypertension treatment    He is on appropriate statin.  He has atorvastatin somehow it fell off the list so I renewed this he will follow up with primary care to continue    Evaluate for ischemic heart disease with a stress test    For consideration of cardiac sarcoid this is a little bit more challenging over time we could certainly do a MRI or FDG PET, the FDG PET is not available locally so would have to do in [place] some more likely have to do a cardiac MRI but I would generally suggest this only if we see significant resting scar on his SPECT scan which has a low sensitivity for evaluation for cardiac sarcoid and if he has more compelling symptoms for cardiac involvement but in general will need a regular assessment for cardiac sarcoid    He may need the GLP-1 injections not 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  Given his complications of diabetes may want to pursue bariatric surgery encouraged him to look into this    I will see Mr. Cotton back in 1 year time and encouraged him to follow up with us over the phone or electronically using my MyChart as issues arise.    It is my pleasure to participate  in the care of Mr. Cotton.  Please do not hesitate to contact me with questions or concerns.    Escobar Colón MD PhD [facility]  Cardiologist [facility]    Please note that this dictation was created using voice recognition software. There may be errors I did not discover before finalizing the note.

## 2020-11-06 LAB
MYCOBACTERIUM SPEC CULT: NORMAL
RHODAMINE-AURAMINE STN SPEC: NORMAL
SIGNIFICANT IND 70042: NORMAL
SITE SITE: NORMAL
SOURCE SOURCE: NORMAL

## 2020-12-02 ENCOUNTER — APPOINTMENT (OUTPATIENT)
Dept: RADIOLOGY | Facility: MEDICAL CENTER | Age: 50
End: 2020-12-02
Attending: INTERNAL MEDICINE
Payer: COMMERCIAL

## 2020-12-14 ENCOUNTER — PATIENT MESSAGE (OUTPATIENT)
Dept: SLEEP MEDICINE | Facility: MEDICAL CENTER | Age: 50
End: 2020-12-14

## 2020-12-22 ENCOUNTER — TELEMEDICINE (OUTPATIENT)
Dept: MEDICAL GROUP | Age: 50
End: 2020-12-22
Payer: COMMERCIAL

## 2020-12-22 VITALS — WEIGHT: 225 LBS | BODY MASS INDEX: 32.21 KG/M2 | HEIGHT: 70 IN | TEMPERATURE: 96 F

## 2020-12-22 DIAGNOSIS — J12.82 PNEUMONIA DUE TO COVID-19 VIRUS: ICD-10-CM

## 2020-12-22 DIAGNOSIS — D86.0 PULMONARY SARCOIDOSIS (HCC): Chronic | ICD-10-CM

## 2020-12-22 DIAGNOSIS — E11.69 DYSLIPIDEMIA ASSOCIATED WITH TYPE 2 DIABETES MELLITUS (HCC): ICD-10-CM

## 2020-12-22 DIAGNOSIS — E78.5 DYSLIPIDEMIA ASSOCIATED WITH TYPE 2 DIABETES MELLITUS (HCC): ICD-10-CM

## 2020-12-22 DIAGNOSIS — U07.1 PNEUMONIA DUE TO COVID-19 VIRUS: ICD-10-CM

## 2020-12-22 PROCEDURE — 99214 OFFICE O/P EST MOD 30 MIN: CPT | Mod: 95,CR | Performed by: PHYSICIAN ASSISTANT

## 2020-12-22 RX ORDER — POLYETHYLENE GLYCOL 3350 17 G/17G
17 POWDER, FOR SOLUTION ORAL DAILY
COMMUNITY
Start: 2020-12-10 | End: 2022-04-11

## 2020-12-22 RX ORDER — GLIPIZIDE 5 MG/1
5 TABLET ORAL
Qty: 180 TAB | Refills: 3 | Status: SHIPPED | OUTPATIENT
Start: 2020-12-22 | End: 2021-04-20 | Stop reason: SDUPTHER

## 2020-12-22 RX ORDER — EMPAGLIFLOZIN 25 MG/1
1 TABLET, FILM COATED ORAL DAILY
Qty: 90 TAB | Refills: 3 | Status: SHIPPED | OUTPATIENT
Start: 2020-12-22

## 2020-12-22 RX ORDER — AZITHROMYCIN 250 MG/1
TABLET, FILM COATED ORAL
COMMUNITY
Start: 2020-12-10 | End: 2021-01-18

## 2020-12-22 RX ORDER — ATORVASTATIN CALCIUM 20 MG/1
20 TABLET, FILM COATED ORAL DAILY
Qty: 90 TAB | Refills: 3 | Status: SHIPPED | OUTPATIENT
Start: 2020-12-22 | End: 2021-04-20 | Stop reason: SDUPTHER

## 2020-12-22 ASSESSMENT — FIBROSIS 4 INDEX: FIB4 SCORE: 0.79

## 2020-12-22 NOTE — PROGRESS NOTES
Virtual Visit: Established Patient   This visit was conducted via Zoom using secure and encrypted videoconferencing technology. The patient was in a private location in the state of [place].    The patient's identity was confirmed and verbal consent was obtained for this virtual visit.    Subjective:   CC:   Chief Complaint   Patient presents with   • Coronavirus Screening     Symptoms    • Medication Refill     Breo Ellipta       Arnulfo Cotton is a 50 y.o. male presenting for evaluation and management of:    COVID  New problem  Patient reports he has been working in [facility].  Chest x-ray shows pneumonia and tested positive for COVID on 12/10/2020  Symptoms started around 12/6/2020-- fatigue, shortness of breath, and cough  Loss of taste and smell.   Reports still coughing. Wife reports same cough as sarcoidosis    Sarcoidosis  Under care of Dr. Holcomb   Biopsy diagnosed sarcoidosis.   No longer seeing [facility].   Echo and vision test next week  Sees pulm early to mid January  OK at this time.     DM  Not adequately controlled. Has not been taking his medications.       ROS   Denies any recent fevers or chills. No nausea or vomiting. No chest pains or shortness of breath.     No Known Allergies    Current medicines (including changes today)  Current Outpatient Medications   Medication Sig Dispense Refill   • azithromycin (ZITHROMAX) 250 MG Tab Take 2 tablets by mouth on day 1, then 1 tablet by mouth each day for 4 days     • polyethylene glycol 3350 (MIRALAX) 17 GM/SCOOP Powder Take 17 g by mouth every day.     • atorvastatin (LIPITOR) 20 MG Tab Take 1 Tab by mouth every day. 90 Tab 3   • glipiZIDE (GLUCOTROL) 5 MG Tab Take 1 Tab by mouth 2 times daily, before breakfast and dinner. 180 Tab 3   • Empagliflozin (JARDIANCE) 25 MG Tab Take 1 Tab by mouth every day. 90 Tab 3   • metformin (GLUCOPHAGE) 1000 MG tablet Take 1 Tab by mouth 2 times a day. 180 Tab 3   • Fluticasone Furoate-Vilanterol (BREO ELLIPTA)  100-25 MCG/INH AEROSOL POWDER, BREATH ACTIVATED Inhale 1 Puff by mouth every day. Rinse mouth after use. 1 Each 3   • therapeutic multivitamin-minerals (THERAGRAN-M) Tab Take 1 Tab by mouth every day.     • tamsulosin (FLOMAX) 0.4 MG capsule TAKE ONE CAPSULE BY MOUTH DAILY 1/2 HOUR AFTER BREAKFAST (Patient not taking: Reported on 12/22/2020) 30 Cap 0     No current facility-administered medications for this visit.        Patient Active Problem List    Diagnosis Date Noted   • Pulmonary sarcoidosis (HCC)    • Hypertriglyceridemia    • Vitamin D deficiency 10/14/2015   • Dyslipidemia 07/05/2015   • Dyslipidemia associated with type 2 diabetes mellitus (HCC) 07/05/2015   • Obesity (BMI 30-39.9) 07/05/2015       Family History   Problem Relation Age of Onset   • Diabetes Mother    • Hypertension Mother    • Diabetes Father    • Other Sister         2019 dx sarcoidosis of lungs   • Cancer Paternal Uncle         melanoma   • Diabetes Maternal Grandmother    • Cancer Maternal Grandfather         stomach   • Diabetes Paternal Grandfather    • Cancer Maternal Aunt         cancer unknown   • Heart Disease Neg Hx    • Stroke Neg Hx        He  has a past medical history of Chest pain, Chest tightness, Cough, Daytime sleepiness, Dental disorder, Difficulty breathing, Fainting, Gasping for breath, Hypertriglyceridemia, Morning headache, Palpitations, Pneumonia (2017), Productive cough, Pulmonary sarcoidosis (HCC), Shortness of breath (04/2020), Sputum production, Type II or unspecified type diabetes mellitus without mention of complication, not stated as uncontrolled, and Wheezing. He also has no past medical history of Insomnia, Painful breathing, Snoring, or Swelling of lower extremity.  He  has a past surgical history that includes mass excision general (1976, 1987); pr bronchoscopy,diagnostic (9/10/2020); and endobronchial us add-on (9/10/2020).       Objective:   Temp (!) 35.6 °C (96 °F) (Oral) Comment: pt reported Comment  "(Src): pt reported  Ht 1.778 m (5' 10\") Comment: pt reported  Wt 102.1 kg (225 lb) Comment: pt reported  BMI 32.28 kg/m²     Physical Exam:  Constitutional: Alert, no distress, well-groomed.  Skin: No rashes in visible areas.  Eye: Round. Conjunctiva clear, lids normal. No icterus.   ENMT: Lips pink without lesions, good dentition, moist mucous membranes. Phonation normal.  Neck: No masses, no thyromegaly. Moves freely without pain.  Respiratory: Unlabored respiratory effort, no cough or audible wheeze  Psych: Alert and oriented x3, normal affect and mood.       Assessment and Plan:   The following treatment plan was discussed:     1. Pneumonia due to COVID-19 virus  - Discussed over-the-counter medications to use for symptomatic relief. Keep well-hydrated and rest.  - Educated patient that on natural course of benign viral URI's and COVID.  - Twice daily use of nasal saline rinse or Neti-Pot encouraged.  - OTC anti-pyretics and decongestants as needed.  -Reviewed [facility] records from ED visit and diagnosis (12/14/2020)  - Isolation vs. Quarantine discussed (depending on test results). Household without symptoms will need to quarantine for 14 days from last point of exposure to patient.   - Encouraged patient to familiarize self with guidelines on CDC website as very clear language and easy to understand instructions depending on situation.  - Anticipatory guidance for symptom progression and when to seek higher level of care and how to perform self care.  - All questions answered.  - Follow-up for worsening symptoms, difficulty breathing, lack of expected recovery, or should new symptoms or problems arise.    2. Pulmonary sarcoidosis (HCC)  Stable. Continue current medicines. Monitor labs regularly. Follow-up with specialist as directed.    3. Dyslipidemia associated with type 2 diabetes mellitus (HCC)  Not adequately controlled. Refilled meds. Encouraged her to complete A1c prior to visit in 2 weeks.  - " HEMOGLOBIN A1C; Future  - atorvastatin (LIPITOR) 20 MG Tab; Take 1 Tab by mouth every day.  Dispense: 90 Tab; Refill: 3  - glipiZIDE (GLUCOTROL) 5 MG Tab; Take 1 Tab by mouth 2 times daily, before breakfast and dinner.  Dispense: 180 Tab; Refill: 3  - Empagliflozin (JARDIANCE) 25 MG Tab; Take 1 Tab by mouth every day.  Dispense: 90 Tab; Refill: 3  - metformin (GLUCOPHAGE) 1000 MG tablet; Take 1 Tab by mouth 2 times a day.  Dispense: 180 Tab; Refill: 3      Any change or worsening of signs or symptoms, patient encouraged to follow-up or report to emergency room for further evaluation. Patient verbalizes understanding and agrees.    Follow-up: Return in about 2 weeks (around 1/5/2021) for lab review, sooner if needed.

## 2020-12-31 ENCOUNTER — HOSPITAL ENCOUNTER (OUTPATIENT)
Dept: CARDIOLOGY | Facility: MEDICAL CENTER | Age: 50
End: 2020-12-31
Attending: INTERNAL MEDICINE
Payer: COMMERCIAL

## 2020-12-31 ENCOUNTER — HOSPITAL ENCOUNTER (OUTPATIENT)
Dept: RADIOLOGY | Facility: MEDICAL CENTER | Age: 50
End: 2020-12-31
Attending: INTERNAL MEDICINE
Payer: COMMERCIAL

## 2020-12-31 DIAGNOSIS — R07.2 PRECORDIAL CHEST PAIN: ICD-10-CM

## 2020-12-31 DIAGNOSIS — D86.9 SARCOIDOSIS: ICD-10-CM

## 2020-12-31 LAB
LV EJECT FRACT  99904: 75
LV EJECT FRACT MOD 2C 99903: 73.64
LV EJECT FRACT MOD 4C 99902: 61.06
LV EJECT FRACT MOD BP 99901: 68.79

## 2020-12-31 PROCEDURE — 93018 CV STRESS TEST I&R ONLY: CPT | Performed by: INTERNAL MEDICINE

## 2020-12-31 PROCEDURE — 78452 HT MUSCLE IMAGE SPECT MULT: CPT | Mod: 26 | Performed by: INTERNAL MEDICINE

## 2020-12-31 PROCEDURE — 93306 TTE W/DOPPLER COMPLETE: CPT

## 2020-12-31 PROCEDURE — A9502 TC99M TETROFOSMIN: HCPCS

## 2020-12-31 PROCEDURE — 93306 TTE W/DOPPLER COMPLETE: CPT | Mod: 26 | Performed by: INTERNAL MEDICINE

## 2021-01-04 ENCOUNTER — PATIENT MESSAGE (OUTPATIENT)
Dept: CARDIOLOGY | Facility: MEDICAL CENTER | Age: 51
End: 2021-01-04

## 2021-01-04 ENCOUNTER — TELEPHONE (OUTPATIENT)
Dept: CARDIOLOGY | Facility: MEDICAL CENTER | Age: 51
End: 2021-01-04

## 2021-01-04 NOTE — TELEPHONE ENCOUNTER
----- Message from Escobar Colón M.D. sent at 12/31/2020  4:56 PM PST -----  Stress test (AND ECHO)  looks good, please let him know     Thank you

## 2021-01-05 ENCOUNTER — TELEPHONE (OUTPATIENT)
Dept: MEDICAL GROUP | Age: 51
End: 2021-01-05

## 2021-01-05 NOTE — TELEPHONE ENCOUNTER
Phone Number Called: [phone]     Call outcome: Spoke with patients wife     Message: Wife states that Arnulfo is out of town and in [facility] for work. Wife states that she will reschedule him when he gets home.

## 2021-01-18 ENCOUNTER — TELEMEDICINE (OUTPATIENT)
Dept: SLEEP MEDICINE | Facility: MEDICAL CENTER | Age: 51
End: 2021-01-18
Payer: COMMERCIAL

## 2021-01-18 VITALS — BODY MASS INDEX: 32.21 KG/M2 | WEIGHT: 225 LBS | HEIGHT: 70 IN

## 2021-01-18 DIAGNOSIS — D86.0 PULMONARY SARCOIDOSIS (HCC): Chronic | ICD-10-CM

## 2021-01-18 DIAGNOSIS — R05.9 COUGH: ICD-10-CM

## 2021-01-18 PROCEDURE — 99213 OFFICE O/P EST LOW 20 MIN: CPT | Mod: 95,CR | Performed by: INTERNAL MEDICINE

## 2021-01-18 ASSESSMENT — FIBROSIS 4 INDEX: FIB4 SCORE: 0.79

## 2021-01-18 NOTE — PROGRESS NOTES
Telemed Pulmonary Note    Video visit was conducted to minimize exposure risk in the context of the ongoing coronavirus pandemic. This encounter was conducted via Platform [employer]. Verbal consent was obtained. Patient's identity was verified. Please note that I could not evaluate the patient in person at the site. All examination and evaluation of the patient and information gathering from the patient and/or the family were done via licensed and securely encrypted tele-video communication equipment.  As such, my assessments and recommendations are limited as far as such telecommunication allows.    Consulting MD: Naheed Holcomb M.D.  Requesting Physician: No att. providers found  Patient name:      Arnulfo Cotton  :         1970  MRN:         [id number]    Date of Service: 2021    Reason for consult:  Follow-Up (Sarcoidosis. Last seeb 10/14/20) and Results (ECHO 20, Card. consult 20, Opthalomology consult 20, CXR 12/10/20)      ASSESSMEN/PLAN:   # Pulmonary sarcoidosis   -- Confirmed on EBUS w/ TBNA showing non-caseating granuloma with an elevated CD4:CD8 ratio and no presence of AFB or fungal.     -- Ace level 73, ESR 11, negative cocci and quantiferon    -- Completed course of steroids   -- Pending repeat CT chest    -- Evaluated by cardiology and ophthalmology  -- Up to date on PPSV23 vaccine   -- RTC 3 months      # Palpitations   -- Echocardiogram showed normal LV function   -- Appreciate cardiology recommendations      # Cough    -- Near complete resolution    -- Cont breo once daily     History of Present Illness: Arnulfo Cotton is a 50 year old male with history of DM and hyperlipidemia who is being follow in pulmonary clinic pulmonary sarcoidosis. He underwent EBUS with TBNA and path positive for granumloma. Fungal and AFB cx negative to date. Cocci and TB testing are negative. Ace-level is elevated. PFTs showed no evidence of airflow obstruction with modest improvement to  bronchodilator. Last office visit 10/14/2020.     Since last office visit, he was diagnosed with COVID-19, not requiring hospitalization. Subjectively, he reports feeling better overall. He is gaining back his appetite. Cough has improved since last visit. Denies SOB, chest pain, N/V, or fever/chills. CT chest ordered but not completed. Evaluated by ophthalmology and there is no evidence of ocular related sarcoidosis. Echocardiogram showed preserved LV function and patient is being follow by Dr. Colón.       Review of Systems   Constitutional: Negative for chills, fever, malaise/fatigue and weight loss.   Respiratory: Negative for cough, hemoptysis, sputum production and shortness of breath.    Cardiovascular: Negative for chest pain, palpitations and orthopnea.   Gastrointestinal: Negative for abdominal pain, nausea and vomiting.       Current Outpatient Medications on File Prior to Visit   Medication Sig Dispense Refill   • polyethylene glycol 3350 (MIRALAX) 17 GM/SCOOP Powder Take 17 g by mouth every day.     • atorvastatin (LIPITOR) 20 MG Tab Take 1 Tab by mouth every day. 90 Tab 3   • glipiZIDE (GLUCOTROL) 5 MG Tab Take 1 Tab by mouth 2 times daily, before breakfast and dinner. 180 Tab 3   • Empagliflozin (JARDIANCE) 25 MG Tab Take 1 Tab by mouth every day. 90 Tab 3   • metformin (GLUCOPHAGE) 1000 MG tablet Take 1 Tab by mouth 2 times a day. 180 Tab 3   • tamsulosin (FLOMAX) 0.4 MG capsule TAKE ONE CAPSULE BY MOUTH DAILY 1/2 HOUR AFTER BREAKFAST 30 Cap 0   • Fluticasone Furoate-Vilanterol (BREO ELLIPTA) 100-25 MCG/INH AEROSOL POWDER, BREATH ACTIVATED Inhale 1 Puff by mouth every day. Rinse mouth after use. 1 Each 3   • therapeutic multivitamin-minerals (THERAGRAN-M) Tab Take 1 Tab by mouth every day.     • azithromycin (ZITHROMAX) 250 MG Tab Take 2 tablets by mouth on day 1, then 1 tablet by mouth each day for 4 days       No current facility-administered medications on file prior to visit.        Social  "History     Tobacco Use   • Smoking status: Passive Smoke Exposure - Never Smoker   • Smokeless tobacco: Former User     Types: Chew   Substance Use Topics   • Alcohol use: No     Alcohol/week: 0.0 oz     Frequency: Never     Binge frequency: Never   • Drug use: No        Past Medical History:   Diagnosis Date   • Chest pain    • Chest tightness    • Cough    • Daytime sleepiness    • Dental disorder     Upper dentures, full   • Difficulty breathing    • Fainting    • Gasping for breath    • Hypertriglyceridemia    • Morning headache    • Palpitations    • Pneumonia 2017   • Productive cough    • Pulmonary sarcoidosis (HCC)    • Shortness of breath 04/2020    with and without exertion   • Sputum production    • Type II or unspecified type diabetes mellitus without mention of complication, not stated as uncontrolled    • Wheezing        Past Surgical History:   Procedure Laterality Date   • PB BRONCHOSCOPY,DIAGNOSTIC  9/10/2020    Procedure: BRONCHOSCOPY - FIBEROPTIC WITH POSSIBLE WASH, BRUSH, BRONCHOALVEOLAR LAVAGE, BIOPSY, FINE NEEDLE ASPIRATION;  Surgeon: Naheed Holcomb M.D.;  Location: [facility];  Service: Ent   • ENDOBRONCHIAL US ADD-ON  9/10/2020    Procedure: ENDOBRONCHIAL ULTRASOUND (EBUS);  Surgeon: Naheed Holcomb M.D.;  Location: SURGERY [facility];  Service: Ent   • MASS EXCISION GENERAL  1976, 1987    Jaw mass/ under chin mass       Allergies: Patient has no known allergies.    Family History   Problem Relation Age of Onset   • Diabetes Mother    • Hypertension Mother    • Diabetes Father    • Other Sister         2019 dx sarcoidosis of lungs   • Cancer Paternal Uncle         melanoma   • Diabetes Maternal Grandmother    • Cancer Maternal Grandfather         stomach   • Diabetes Paternal Grandfather    • Cancer Maternal Aunt         cancer unknown   • Heart Disease Neg Hx    • Stroke Neg Hx        Vitals:    01/18/21 1133   Height: 1.778 m (5' 10\")   Weight: 102.1 kg (225 lb)   Weight % change " since last entry.: 0 %   BMI (Calculated): 32.28       Physical Examination  Unable to perform physical exam due to virtual visit      Naheed Holcomb MD   Pulmonary Critical Care   formerly [facility]

## 2021-01-19 ASSESSMENT — ENCOUNTER SYMPTOMS
SHORTNESS OF BREATH: 0
PALPITATIONS: 0
HEMOPTYSIS: 0
COUGH: 0
VOMITING: 0
NAUSEA: 0
WEIGHT LOSS: 0
ABDOMINAL PAIN: 0
FEVER: 0
ORTHOPNEA: 0
SPUTUM PRODUCTION: 0
CHILLS: 0

## 2021-01-27 NOTE — PATIENT COMMUNICATION
Upon chart review, received status pt has not reviewed MyChart message, see below.        Letter printed with MD recommendations and mailed to pt mailing address.

## 2021-01-31 ENCOUNTER — PATIENT MESSAGE (OUTPATIENT)
Dept: MEDICAL GROUP | Age: 51
End: 2021-01-31

## 2021-01-31 DIAGNOSIS — R39.12 WEAK URINARY STREAM: ICD-10-CM

## 2021-02-01 RX ORDER — TAMSULOSIN HYDROCHLORIDE 0.4 MG/1
0.4 CAPSULE ORAL DAILY
Qty: 30 CAP | Refills: 0 | Status: SHIPPED | OUTPATIENT
Start: 2021-02-01 | End: 2021-04-20 | Stop reason: SDUPTHER

## 2021-03-04 ENCOUNTER — PATIENT MESSAGE (OUTPATIENT)
Dept: MEDICAL GROUP | Age: 51
End: 2021-03-04

## 2021-03-04 DIAGNOSIS — E78.5 DYSLIPIDEMIA ASSOCIATED WITH TYPE 2 DIABETES MELLITUS (HCC): ICD-10-CM

## 2021-03-04 DIAGNOSIS — D86.0 PULMONARY SARCOIDOSIS (HCC): ICD-10-CM

## 2021-03-04 DIAGNOSIS — E11.69 DYSLIPIDEMIA ASSOCIATED WITH TYPE 2 DIABETES MELLITUS (HCC): ICD-10-CM

## 2021-04-09 LAB
CHOLEST SERPL-MCNC: 158 MG/DL
CREAT SERPL-MCNC: 0.82 MG/DL
HBA1C MFR BLD: 9.7 % (ref 0–5.6)
HDLC SERPL-MCNC: 49 MG/DL
LDLC SERPL CALC-MCNC: 93 MG/DL
MICROALBUMIN UR RANDOM 4600: <0.2
TRIGL SERPL-MCNC: 73 MG/DL

## 2021-04-20 ENCOUNTER — OFFICE VISIT (OUTPATIENT)
Dept: MEDICAL GROUP | Age: 51
End: 2021-04-20
Payer: COMMERCIAL

## 2021-04-20 VITALS
DIASTOLIC BLOOD PRESSURE: 62 MMHG | TEMPERATURE: 96.9 F | HEIGHT: 70 IN | WEIGHT: 263.2 LBS | OXYGEN SATURATION: 99 % | BODY MASS INDEX: 37.68 KG/M2 | HEART RATE: 87 BPM | SYSTOLIC BLOOD PRESSURE: 130 MMHG

## 2021-04-20 DIAGNOSIS — D86.0 PULMONARY SARCOIDOSIS (HCC): Chronic | ICD-10-CM

## 2021-04-20 DIAGNOSIS — E66.9 OBESITY (BMI 30-39.9): ICD-10-CM

## 2021-04-20 DIAGNOSIS — K59.01 SLOW TRANSIT CONSTIPATION: ICD-10-CM

## 2021-04-20 DIAGNOSIS — R31.9 PAINLESS HEMATURIA: ICD-10-CM

## 2021-04-20 DIAGNOSIS — E11.69 DYSLIPIDEMIA ASSOCIATED WITH TYPE 2 DIABETES MELLITUS (HCC): ICD-10-CM

## 2021-04-20 DIAGNOSIS — E78.5 DYSLIPIDEMIA ASSOCIATED WITH TYPE 2 DIABETES MELLITUS (HCC): ICD-10-CM

## 2021-04-20 DIAGNOSIS — R39.12 WEAK URINARY STREAM: ICD-10-CM

## 2021-04-20 DIAGNOSIS — N52.1 ERECTILE DYSFUNCTION DUE TO DISEASES CLASSIFIED ELSEWHERE: ICD-10-CM

## 2021-04-20 LAB
APPEARANCE UR: CLEAR
BILIRUB UR STRIP-MCNC: NORMAL MG/DL
COLOR UR AUTO: YELLOW
GLUCOSE UR STRIP.AUTO-MCNC: >=1000 MG/DL
KETONES UR STRIP.AUTO-MCNC: NORMAL MG/DL
LEUKOCYTE ESTERASE UR QL STRIP.AUTO: NORMAL
NITRITE UR QL STRIP.AUTO: NORMAL
PH UR STRIP.AUTO: 5.5 [PH] (ref 5–8)
PROT UR QL STRIP: NORMAL MG/DL
RBC UR QL AUTO: NORMAL
SP GR UR STRIP.AUTO: >=1.03
UROBILINOGEN UR STRIP-MCNC: 1 MG/DL

## 2021-04-20 PROCEDURE — 99215 OFFICE O/P EST HI 40 MIN: CPT | Performed by: PHYSICIAN ASSISTANT

## 2021-04-20 PROCEDURE — 81002 URINALYSIS NONAUTO W/O SCOPE: CPT | Performed by: PHYSICIAN ASSISTANT

## 2021-04-20 RX ORDER — GLIPIZIDE 10 MG/1
10 TABLET ORAL
Qty: 180 TABLET | Refills: 2 | Status: SHIPPED | OUTPATIENT
Start: 2021-04-20 | End: 2021-04-20

## 2021-04-20 RX ORDER — GLIPIZIDE 10 MG/1
TABLET ORAL
Qty: 90 TABLET | Refills: 3 | Status: SHIPPED | OUTPATIENT
Start: 2021-04-20

## 2021-04-20 RX ORDER — ATORVASTATIN CALCIUM 20 MG/1
20 TABLET, FILM COATED ORAL DAILY
Qty: 90 TABLET | Refills: 3 | Status: SHIPPED | OUTPATIENT
Start: 2021-04-20

## 2021-04-20 RX ORDER — TAMSULOSIN HYDROCHLORIDE 0.4 MG/1
0.4 CAPSULE ORAL DAILY
Qty: 30 CAPSULE | Refills: 0 | Status: SHIPPED | OUTPATIENT
Start: 2021-04-20 | End: 2021-08-24 | Stop reason: SDUPTHER

## 2021-04-20 ASSESSMENT — PATIENT HEALTH QUESTIONNAIRE - PHQ9: CLINICAL INTERPRETATION OF PHQ2 SCORE: 0

## 2021-04-20 ASSESSMENT — FIBROSIS 4 INDEX: FIB4 SCORE: 0.81

## 2021-04-20 NOTE — PROGRESS NOTES
Subjective:     Chief Complaint   Patient presents with   • Dyslipidemia     Follow-up     Arnulfo Cotton is a 51 y.o. male here today for evaluation and management of:    Dyslipidemia associated with type 2 diabetes mellitus (HCC)  Patient has been taking meds. Some days only took Metformin (per diary) but didn't have a good explanation as to why.  Nocturia down from 4-6 times per night to once.  Not as thirsty.  Taking statin as prescribed- no side effects.  No hypoglycemic events-- lowest high 90s.    Obesity (BMI 30-39.9)  Diet fair. Reporting some constipation. Enjoys vegetables but only eating a few times per week.   Working harder at gym. Trying to get in shape.    Pulmonary sarcoidosis (HCC)  Under care of pulm. Due for CT and follow-up visit.    Urination issues  Weak urinary flow, blood in urine and ED.  Nocturia down to 1 urination  Blood noted in urine today in office.   Flomax daily--no improvement.     Current medicines (including changes today)  Current Outpatient Medications   Medication Sig Dispense Refill   • atorvastatin (LIPITOR) 20 MG Tab Take 1 tablet by mouth every day. 90 tablet 3   • tamsulosin (FLOMAX) 0.4 MG capsule Take 1 capsule by mouth every day. 1/2 HOUR AFTER BREAKFAST 30 capsule 0   • metformin (GLUCOPHAGE) 1000 MG tablet Take 1 tablet in the morning and 1.5 tablets with evening meal. 225 tablet 3   • glipiZIDE (GLUCOTROL) 10 MG Tab Take 1 tablet in the morning and 0.5 tab in evening. 90 tablet 3   • polyethylene glycol 3350 (MIRALAX) 17 GM/SCOOP Powder Take 17 g by mouth every day.     • Empagliflozin (JARDIANCE) 25 MG Tab Take 1 Tab by mouth every day. 90 Tab 3   • Fluticasone Furoate-Vilanterol (BREO ELLIPTA) 100-25 MCG/INH AEROSOL POWDER, BREATH ACTIVATED Inhale 1 Puff by mouth every day. Rinse mouth after use. 1 Each 3   • therapeutic multivitamin-minerals (THERAGRAN-M) Tab Take 1 Tab by mouth every day.       No current facility-administered medications for this visit.  "       Objective:     Vitals:    04/20/21 0733   BP: 130/62   BP Location: Left arm   Patient Position: Sitting   BP Cuff Size: Adult   Pulse: 87   Temp: 36.1 °C (96.9 °F)   TempSrc: Temporal   SpO2: 99%   Weight: 119 kg (263 lb 3.2 oz)   Height: 1.778 m (5' 10\")     Body mass index is 37.77 kg/m².     Physical Exam:  Constitutional: Alert, no distress, well-groomed.  Skin: Warm, dry, good turgor, no rashes in visible areas.  Eye: Equal, round and reactive, conjunctiva clear, lids normal.  ENMT: Lips without lesions, good dentition, moist mucous membranes.  Neck: Trachea midline, no masses, no thyromegaly.  Cardiovascular: Regular rate and rhythm.  Chest: Effort normal. Clear to auscultation throughout. No adventitious sounds.   Abdomen: Soft, no gross masses.  MSK: Normal gait, moves all extremities.  Neuro: Grossly non-focal. No cranial nerve deficit. Strength and sensation intact.   Psych: Alert and oriented x3, normal affect and mood.    [facility] 4/9/21   A1c on 4/9/21 9.7% (checked erroneously in office and was 10.5%-- reports after labs he slacked off some).    AST 14; ALT 19  eGFR 104  Albumin/Cr ratio <0.2 mg/dL  Total Cholesterol 158; HDL 49; Triglycerides 73; LDL 93    Results for orders placed or performed in visit on 04/20/21   POCT Urinalysis   Result Value Ref Range    POC Color YELLOW Negative    POC Appearance CLEAR Negative    POC Leukocyte Esterase NEG Negative    POC Nitrites NEG Negative    POC Urobiligen 1.0 Negative (0.2) mg/dL    POC Protein NEG Negative mg/dL    POC Urine PH 5.5 5.0 - 8.0    POC Blood NEG Negative    POC Specific Gravity >=1.030 <1.005 - >1.030    POC Ketones NEG Negative mg/dL    POC Bilirubin NEG Negative mg/dL    POC Glucose >=1,000 Negative mg/dL    Reviewed and discussed above labs with patient in visit.      Assessment and Plan:   The following treatment plan was discussed:     1. Dyslipidemia associated with type 2 diabetes mellitus (HCC)  Lab Results   Component " Value Date/Time    HBA1C 9.7 (A) 04/09/2021 12:00 AM    HBA1C 12.4 (A) 06/09/2020 10:53 AM    HBA1C 12.8 (A) 12/16/2019 07:30 AM      Chronic, uncontrolled. Will increase Metformin to 1500 mg in evening instead of 1000. He will continue with 1000 in the AM. Will increase glipizide to 10 mg in the morning and 0.5 mg in the evening instead of 5 mg BID. Will follow-up in one month to reassess glucose log. Reviewed glucose log today--poor control. No hypoglycemia noted.  Discussed importance of medication compliance and improved diet/exercise. He will continue to work hard on this.  - atorvastatin (LIPITOR) 20 MG Tab; Take 1 tablet by mouth every day.  Dispense: 90 tablet; Refill: 3  - metformin (GLUCOPHAGE) 1000 MG tablet; Take 1 tablet in the morning and 1.5 tablets with evening meal.  Dispense: 225 tablet; Refill: 3  - glipiZIDE (GLUCOTROL) 10 MG Tab; Take 1 tablet in the morning and 0.5 tab in evening.  Dispense: 90 tablet; Refill: 3    2. Obesity (BMI 30-39.9)  - Encouraged diet high in fruits, vegetables, and fiber. And a diet low in salt, refined carbohydrates, cholesterol, saturated fat, and trans fatty acids.    - Encouraged  a minimum of 30 minutes of moderate intensity aerobic exercise (eg, brisk walking) is recommended on five days each week. Or 20 minutes of vigorous-intensity aerobic exercise (eg, jogging) on three days each week.   - Patient identified as having weight management issue.  Appropriate orders and counseling given.    3. Pulmonary sarcoidosis (HCC)  Chronic, due for CT and to schedule follow-up with pulm. I stressed the importance of completing these tests and routine follow-ups. He verbalized understanding.    4. Painless hematuria  Blood noted in urine today. Will send to urology for this and other complaints for further evaluation.  - REFERRAL TO UROLOGY  - POCT Urinalysis    5. Weak urinary stream  Chronic, not adequately controlled with flomax. Will send to urology for further  evaluation.  - tamsulosin (FLOMAX) 0.4 MG capsule; Take 1 capsule by mouth every day. 1/2 HOUR AFTER BREAKFAST  Dispense: 30 capsule; Refill: 0    6. Erectile dysfunction due to diseases classified elsewhere  Chronic, likely secondary to poor glycemic control. Patient would like to see urology to discuss further. Encouraged him to get DM under control and might see improvements in function.    7. Slow transit constipation  Acute, increase fiber. Increase in Metformin might help. Increase water. With diet improvements should self resolve. Due for colorectal cancer screening in less than one year. Will continue to monitor        Followup: Return in about 1 month (around 5/20/2021) for follow-up on DM with DM Nurse (DM-RN visit), sooner if needed.         My total time spent caring for the patient on the day of the encounter was 44 minutes.   This does not include time spent on separately billable procedures/tests.

## 2021-04-20 NOTE — PROGRESS NOTES
Order(s) created erroneously. Erroneous order ID: [id number]   Order canceled by: DAVID BOND   Order cancel date/time: 04/20/2021 8:42 AM

## 2021-08-26 ENCOUNTER — TELEPHONE (OUTPATIENT)
Dept: MEDICAL GROUP | Age: 51
End: 2021-08-26

## 2021-08-27 NOTE — TELEPHONE ENCOUNTER
Phone Number Called: [phone]    Call outcome: Left detailed message for patient. Informed to call back with any additional questions.    Message: 1st attempt

## 2021-12-26 DIAGNOSIS — D86.0 PULMONARY SARCOIDOSIS (HCC): ICD-10-CM

## 2021-12-27 NOTE — TELEPHONE ENCOUNTER
Caller Name: Arnulfo Cotton                 Call Back Number: [phone] (home)         Patient approves a detailed voicemail message: N\A    Have we ever prescribed this med? Yes.  If yes, what date? 8/28/2020     Last OV: 1/18/21 Dr. Aicha DODGE 3 months   Next OV: none     DX: pulmonary sarcoidosis    Medications:  Current Outpatient Medications   Medication Sig Dispense Refill   • tamsulosin (FLOMAX) 0.4 MG capsule TAKE ONE CAPSULE BY MOUTH DAILY 1/2 HOUR AFTER BREAKFAST 90 Capsule 0   • atorvastatin (LIPITOR) 20 MG Tab Take 1 tablet by mouth every day. 90 tablet 3   • metformin (GLUCOPHAGE) 1000 MG tablet Take 1 tablet in the morning and 1.5 tablets with evening meal. 225 tablet 3   • glipiZIDE (GLUCOTROL) 10 MG Tab Take 1 tablet in the morning and 0.5 tab in evening. 90 tablet 3   • polyethylene glycol 3350 (MIRALAX) 17 GM/SCOOP Powder Take 17 g by mouth every day.     • Empagliflozin (JARDIANCE) 25 MG Tab Take 1 Tab by mouth every day. 90 Tab 3   • Fluticasone Furoate-Vilanterol (BREO ELLIPTA) 100-25 MCG/INH AEROSOL POWDER, BREATH ACTIVATED Inhale 1 Puff by mouth every day. Rinse mouth after use. 1 Each 3   • therapeutic multivitamin-minerals (THERAGRAN-M) Tab Take 1 Tab by mouth every day.       No current facility-administered medications for this visit.

## 2022-02-15 DIAGNOSIS — E11.69 DYSLIPIDEMIA ASSOCIATED WITH TYPE 2 DIABETES MELLITUS (HCC): ICD-10-CM

## 2022-02-15 DIAGNOSIS — E78.5 DYSLIPIDEMIA ASSOCIATED WITH TYPE 2 DIABETES MELLITUS (HCC): ICD-10-CM

## 2022-02-15 DIAGNOSIS — Z12.11 COLON CANCER SCREENING: ICD-10-CM

## 2022-02-15 DIAGNOSIS — Z12.5 SCREENING FOR PROSTATE CANCER: ICD-10-CM

## 2022-04-08 LAB
CHOLEST SERPL-MCNC: 189 MG/DL
HDLC SERPL-MCNC: 53 MG/DL
LDLC SERPL CALC-MCNC: 115 MG/DL
MICROAL CRT RATIO 4634: 10
TRIGL SERPL-MCNC: 99 MG/DL

## 2022-04-11 ENCOUNTER — OFFICE VISIT (OUTPATIENT)
Dept: MEDICAL GROUP | Age: 52
End: 2022-04-11

## 2022-04-11 ENCOUNTER — OFFICE VISIT (OUTPATIENT)
Dept: MEDICAL GROUP | Age: 52
End: 2022-04-11
Payer: COMMERCIAL

## 2022-04-11 VITALS
WEIGHT: 261.8 LBS | HEIGHT: 70 IN | SYSTOLIC BLOOD PRESSURE: 110 MMHG | DIASTOLIC BLOOD PRESSURE: 76 MMHG | BODY MASS INDEX: 37.48 KG/M2

## 2022-04-11 DIAGNOSIS — E11.69 DYSLIPIDEMIA ASSOCIATED WITH TYPE 2 DIABETES MELLITUS (HCC): ICD-10-CM

## 2022-04-11 DIAGNOSIS — D86.0 PULMONARY SARCOIDOSIS (HCC): Chronic | ICD-10-CM

## 2022-04-11 DIAGNOSIS — N52.1 DIABETES MELLITUS WITH CIRCULATORY DISORDER CAUSING ERECTILE DYSFUNCTION (HCC): ICD-10-CM

## 2022-04-11 DIAGNOSIS — E78.5 DYSLIPIDEMIA ASSOCIATED WITH TYPE 2 DIABETES MELLITUS (HCC): ICD-10-CM

## 2022-04-11 DIAGNOSIS — E11.65 UNCONTROLLED TYPE 2 DIABETES MELLITUS WITH HYPERGLYCEMIA (HCC): ICD-10-CM

## 2022-04-11 DIAGNOSIS — E66.01 CLASS 2 SEVERE OBESITY DUE TO EXCESS CALORIES WITH SERIOUS COMORBIDITY AND BODY MASS INDEX (BMI) OF 37.0 TO 37.9 IN ADULT (HCC): ICD-10-CM

## 2022-04-11 DIAGNOSIS — E11.59 DIABETES MELLITUS WITH CIRCULATORY DISORDER CAUSING ERECTILE DYSFUNCTION (HCC): ICD-10-CM

## 2022-04-11 DIAGNOSIS — E83.52 HYPERCALCEMIA: ICD-10-CM

## 2022-04-11 DIAGNOSIS — E11.3293 TYPE 2 DIABETES MELLITUS WITH BOTH EYES AFFECTED BY MILD NONPROLIFERATIVE RETINOPATHY WITHOUT MACULAR EDEMA, WITHOUT LONG-TERM CURRENT USE OF INSULIN (HCC): ICD-10-CM

## 2022-04-11 LAB
HBA1C MFR BLD: 13.4 % (ref 0–5.6)
INT CON NEG: ABNORMAL
INT CON POS: ABNORMAL

## 2022-04-11 PROCEDURE — 99214 OFFICE O/P EST MOD 30 MIN: CPT | Performed by: PHYSICIAN ASSISTANT

## 2022-04-11 RX ORDER — INSULIN GLARGINE 300 U/ML
15 INJECTION, SOLUTION SUBCUTANEOUS DAILY
Qty: 3 ML | Refills: 2 | Status: SHIPPED | OUTPATIENT
Start: 2022-04-11

## 2022-04-11 RX ORDER — NAPROXEN 500 MG/1
TABLET ORAL
COMMUNITY
Start: 2022-04-06 | End: 2022-04-11

## 2022-04-11 ASSESSMENT — PATIENT HEALTH QUESTIONNAIRE - PHQ9: CLINICAL INTERPRETATION OF PHQ2 SCORE: 0

## 2022-04-11 ASSESSMENT — FIBROSIS 4 INDEX: FIB4 SCORE: 0.82

## 2022-04-11 NOTE — PROGRESS NOTES
"RN-CDE Note    Subjective:     HPI:  Arnulfo Cotton is a 52 y.o. old patient who is seen by the Diabetes Nurse Specialist today for review of his uncontrolled type 2 diabetes.    Changes in Health: ***    Diabetes Medications:   Metformin 1000 mg am and 1500 mg pm  Glipizide 10 mg am and 5 mg pm  Jardiance 25 mg daily  Taking above medications as prescribed: {YES / NO DMRN:43438}  Taking daily ASA: Not Indicated    Exercise: ***  Diet: {DIET HABITS:52348}  Patient's body mass index is unknown because there is no height or weight on file. Exercise and nutrition counseling were performed at this visit.      Health Maintenance:   Health Maintenance Due   Topic Date Due   • COVID-19 Vaccine (1) Never done   • IMM HEP B VACCINE (1 of 3 - Risk 3-dose series) Never done   • IMM PNEUMOCOCCAL VACCINE: 0-64 Years (2 - PCV) 10/14/2016   • DIABETES MONOFILAMENT / LE EXAM  2021   • RETINAL SCREENING  2021   • COLORECTAL CANCER SCREENING  Never done   • IMM ZOSTER VACCINES (1 of 2) Never done         DM:   Last A1c:   Lab Results   Component Value Date/Time    HBA1C 9.7 (A) 2021 12:00 AM      Previous A1c was 9.7 on 21  A1C GOAL: < 7    Glucose monitoring frequency: ***  {GLUCOSE TESTIN}  Hypoglycemic episodes: {YES***/NO:60}    Last Retinal Exam: {LAST RETINAL EXAM:}  Daily Foot Exam: {YES (DEF)/NO:00732::\"Yes\"} ***    Exam:  Monofilament testing with a 10 gram force: sensation intact: {MONOFILAMENT SENSATION/PULSE:::\"intact bilaterally\"}  Visual Inspection: Feet {WITH-WITHOUT DEFW/O:::\"without\"} maceration, ulcers, fissures.  Pedal pulses: {MONOFILAMENT SENSATION/PULSE:::\"intact bilaterally\"}      Lab Results   Component Value Date/Time    MALBCRT 5 2015 11:06 AM    MICROALBUR <0.2 2021 12:00 AM    MICROALBUR 0.7 2015 11:06 AM        ACR Albumin/Creatinine Ratio goal <30     HTN:   Blood pressure goal <140/<80 ***.   Currently Rx ACE/ARB: Not Indicated " "    Dyslipidemia:    Lab Results   Component Value Date/Time    CHOLSTRLTOT 158 04/09/2021 12:00 AM    LDL 93 04/09/2021 12:00 AM    HDL 49 04/09/2021 12:00 AM    TRIGLYCERIDE 73 04/09/2021 12:00 AM         Currently Rx Statin: Yes     He  reports that he is a non-smoker but has been exposed to tobacco smoke. He has been exposed to 0.00 packs per day. He quit smokeless tobacco use about 12 years ago.  His smokeless tobacco use included chew.      Plan:     Discussed and educated on:   {DIABETES RECOMMENDATIONS:06063::\"- All medications, side effects and compliance (discussed carefully)\",\"- Annual eye examinations at Ophthalmology\",\"- Home glucose monitoring emphasized\",\"- Weight control and daily exercise\"}    Recommended medication changes: ***   "

## 2022-04-11 NOTE — LETTER
[facility]  Nelida Bearden P.A.-C.  [address]  [place] [address]  Fax: [phone]   Authorization for Release/Disclosure of   Protected Health Information   Name: ARNULFO COTTON : 1970 SSN: [id number]   Address: [address] [place] [address] [place]  [place] [address] Phone:    [phone] (home)    I authorize the entity listed below to release/disclose the PHI below to:   [facility]/Nelida Bearden P.A.-C. and Nelida Bearden P.A.-C.   Provider or Entity Name:  [facility]   Address   City, State, [place], [place]   Phone:      Fax:     Reason for request: continuity of care   Information to be released:    [  ] LAST COLONOSCOPY,  including any PATH REPORT and follow-up  [  ] LAST FIT/COLOGUARD RESULT [  ] LAST DEXA  [  ] LAST MAMMOGRAM  [  ] LAST PAP  [  ] LAST LABS [  ] RETINA EXAM REPORT  [  ] IMMUNIZATION RECORDS  [  ] Release all info      [  ] Check here and initial the line next to each item to release ALL health information INCLUDING  _____ Care and treatment for drug and / or alcohol abuse  _____ HIV testing, infection status, or AIDS  _____ Genetic Testing    DATES OF SERVICE OR TIME PERIOD TO BE DISCLOSED: _____________  I understand and acknowledge that:  * This Authorization may be revoked at any time by you in writing, except if your health information has already been used or disclosed.  * Your health information that will be used or disclosed as a result of you signing this authorization could be re-disclosed by the recipient. If this occurs, your re-disclosed health information may no longer be protected by State or Federal laws.  * You may refuse to sign this Authorization. Your refusal will not affect your ability to obtain treatment.  * This Authorization becomes effective upon signing and will  on (date) __________.      If no date is indicated, this Authorization will  one (1) year from the signature date.    Name: Arnulfo Cotton    Signature:   Date:     2022        PLEASE FAX REQUESTED RECORDS BACK TO: [phone]

## 2022-04-11 NOTE — LETTER
[facility]  Nelida Bearden P.A.-C.  [address]  [place] [address]  Fax: [phone]   Authorization for Release/Disclosure of   Protected Health Information   Name: ARNULFO COTTON : 1970 SSN: [id number]   Address: [address] [place] [address] [place] [address] Phone:    [phone] (home)    I authorize the entity listed below to release/disclose the PHI below to:   [facility]/Nelida Bearden P.A.-C. and Nelida Bearden P.A.-C.   Provider or Entity Name:  [facility]   Address   City, State, [place], [place] Phone:  [phone]    Fax:  [phone]   Reason for request: continuity of care   Information to be released:    [  ] LAST COLONOSCOPY,  including any PATH REPORT and follow-up  [  ] LAST FIT/COLOGUARD RESULT [  ] LAST DEXA  [  ] LAST MAMMOGRAM  [  ] LAST PAP  [  ] LAST LABS [ X ] RETINA EXAM REPORT  [  ] IMMUNIZATION RECORDS  [  ] Release all info      [  ] Check here and initial the line next to each item to release ALL health information INCLUDING  _____ Care and treatment for drug and / or alcohol abuse  _____ HIV testing, infection status, or AIDS  _____ Genetic Testing    DATES OF SERVICE OR TIME PERIOD TO BE DISCLOSED: _____________  I understand and acknowledge that:  * This Authorization may be revoked at any time by you in writing, except if your health information has already been used or disclosed.  * Your health information that will be used or disclosed as a result of you signing this authorization could be re-disclosed by the recipient. If this occurs, your re-disclosed health information may no longer be protected by State or Federal laws.  * You may refuse to sign this Authorization. Your refusal will not affect your ability to obtain treatment.  * This Authorization becomes effective upon signing and will  on (date) __________.      If no date is indicated, this Authorization will  one (1) year from the signature date.    Name: Arnulfo Cotton    Signature:  Continuity of care   Date:     4/14/2022       PLEASE FAX REQUESTED RECORDS BACK TO: [phone]

## 2022-04-11 NOTE — PROGRESS NOTES
RN-CDE Note    Subjective:     HPI:  Arnulfo Cotton is a 52 y.o. old patient who is seen by the Diabetes Nurse Specialist today for review of his uncontrolled type 2 diabetes.    Changes in Health: complaining of problems with equilibrium at times and states his eyes are getting a little blurry    Diabetes Medications:   Metformin 1000 mg bid  Glipizide 10 mg am and 5 mg pm  Jardiance 25 mg daily  Taking above medications as prescribed: yes  Taking daily ASA: No    Exercise: states active daily  Diet: trying not to eat   Patient's body mass index is 37.56 kg/m². Exercise and nutrition counseling were performed at this visit.      Health Maintenance:   Health Maintenance Due   Topic Date Due   • COVID-19 Vaccine (1) Never done   • IMM HEP B VACCINE (1 of 3 - Risk 3-dose series) Never done   • IMM PNEUMOCOCCAL VACCINE: 0-64 Years (2 - PCV) 10/14/2016   • DIABETES MONOFILAMENT / LE EXAM  06/23/2021   • RETINAL SCREENING  12/29/2021   • COLORECTAL CANCER SCREENING  Never done   • IMM ZOSTER VACCINES (1 of 2) Never done         DM:   Last A1c:   Lab Results   Component Value Date/Time    HBA1C 13.4 (A) 04/11/2022 09:23 AM      Previous A1c was 9.7 on 4/9/21  A1C GOAL: < 7    Glucose monitoring frequency: states he tests on occasion, state on a good day his blood sugars are around 2510    Hypoglycemic episodes: no    Last Retinal Exam: states completed in the past month, states he was seen by opthamologist in [place] and they did find some damage due to diabetes.   Daily Foot Exam: Yes     Exam:  Monofilament testing with a 10 gram force: sensation intact: intact bilaterally  Visual Inspection: Feet without maceration, ulcers, fissures.  Pedal pulses: intact bilaterally        Lab Results   Component Value Date/Time    MALBCRT 5 06/13/2015 11:06 AM    MICROALBUR <0.2 04/09/2021 12:00 AM    MICROALBUR 0.7 06/13/2015 11:06 AM        ACR Albumin/Creatinine Ratio goal <30     HTN:   Blood pressure goal <140/<80 at goal.    Currently Rx ACE/ARB: No     Dyslipidemia:    Lab Results   Component Value Date/Time    CHOLSTRLTOT 158 04/09/2021 12:00 AM    LDL 93 04/09/2021 12:00 AM    HDL 49 04/09/2021 12:00 AM    TRIGLYCERIDE 73 04/09/2021 12:00 AM         Currently Rx Statin: Yes     He  reports that he is a non-smoker but has been exposed to tobacco smoke. He has been exposed to 0.00 packs per day. He quit smokeless tobacco use about 12 years ago.  His smokeless tobacco use included chew.      Plan:     Discussed and educated on:   - All medications, side effects and compliance (discussed carefully)  - Annual eye examinations at Ophthalmology  - Foot Care: what to look for when checking feet every day  - Glucose meter dispensed to patient  - Home glucose monitoring emphasized  - Long term diabetic complications  - Weight control and daily exercise    Recommended medication changes: add Toujeo 15 units daily

## 2022-04-11 NOTE — PROGRESS NOTES
Subjective:     Chief Complaint   Patient presents with   • Diabetes Follow-up     Arnulfo Cotton is a 52 y.o. 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  Exercise frequency and limitations reviewed.  Feet symptoms reviewed.  Nutritional status reviewed.  Retinal eye exam history reviewed.    ED persists.   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    Pulmonary sarcoidosis (HCC)/Hypercalcemia  Has not followed up with pulm regarding sarcoidosis. Calcium elevated on recent labs.   Reports a lot of fatigue.       Current medicines (including changes today)  Current Outpatient Medications   Medication Sig Dispense Refill   • Insulin Glargine, 2 Unit Dial, (TOUJEO MAX SOLOSTAR) 300 UNIT/ML Solution Pen-injector Inject 15 mg under the skin every day. 3 mL 2   • Insulin Pen Needle 32 G x 4 mm Using 1 per day 100 Each 0   • BREO ELLIPTA 100-25 MCG/INH AEROSOL POWDER, BREATH ACTIVATED INHALE ONE DOSE BY MOUTH DAILY 1 Each 3   • tamsulosin (FLOMAX) 0.4 MG capsule TAKE ONE CAPSULE BY MOUTH DAILY 1/2 HOUR AFTER BREAKFAST 90 Capsule 0   • atorvastatin (LIPITOR) 20 MG Tab Take 1 tablet by mouth every day. 90 tablet 3   • metformin (GLUCOPHAGE) 1000 MG tablet Take 1 tablet in the morning and 1.5 tablets with evening meal. (Patient taking differently: 1,000 mg 2 times a day with meals.) 225 tablet 3   • glipiZIDE (GLUCOTROL) 10 MG Tab Take 1 tablet in the morning and 0.5 tab in evening. 90 tablet 3   • Empagliflozin  "(JARDIANCE) 25 MG Tab Take 1 Tab by mouth every day. 90 Tab 3   • therapeutic multivitamin-minerals (THERAGRAN-M) Tab Take 1 Tab by mouth every day.       No current facility-administered medications for this visit.        Objective:     Vitals:    04/11/22 0901   BP: 110/76   BP Location: Right arm   Patient Position: Sitting   BP Cuff Size: Large adult   Weight: 119 kg (261 lb 12.8 oz)   Height: 1.778 m (5' 10\")     Body mass index is 37.56 kg/m².     Physical Exam:  Constitutional: Alert, no distress, well-groomed.  Skin: Warm, dry, good turgor, no rashes in visible areas.  Eye: Equal, round and reactive, conjunctiva clear, lids normal.  ENMT: Lips without lesions, good dentition, moist mucous membranes.  Neck: Trachea midline, no masses, no thyromegaly.  Cardiovascular: Regular rate and rhythm.  Chest: Effort normal. Clear to auscultation throughout. No adventitious sounds.   Abdomen: Soft, no gross masses.  MSK: Normal gait, moves all extremities.  Neuro: Grossly non-focal. No cranial nerve deficit. Strength and sensation intact.   Psych: Alert and oriented x3, normal affect and mood.      Assessment and Plan:   The following treatment plan was discussed:     1. Uncontrolled type 2 diabetes mellitus with hyperglycemia ([facility])  2. Dyslipidemia associated with type 2 diabetes mellitus ([facility])  3. Diabetes mellitus with circulatory disorder causing erectile dysfunction ([facility])  4. Class 2 severe obesity due to excess calories with serious comorbidity and body mass index (BMI) of 37.0 to 37.9 in adult ([facility])  5. Type 2 diabetes mellitus with both eyes affected by mild nonproliferative retinopathy without macular edema, without long-term current use of insulin (HCC)  Chronic, exacerbated. Very poorly controlled. Will start on insulin daily in addition to his metformin, glipizide, jardiance. Follow-up in 1 month.   DM2 A1c is NOT at goal   -RN-CDE notes reviewed and discussed.  -Patient's body mass index is 37.56 kg/m². " Exercise and nutrition counseling were performed at this visit.   Additionally discussed disease management and weight loss goals.   -Education and advice provided today: See RN-CDE note.   - Insulin Glargine, 2 Unit Dial, (TOUJEO MAX SOLOSTAR) 300 UNIT/ML Solution Pen-injector; Inject 15 mg under the skin every day.  Dispense: 3 mL; Refill: 2  - Insulin Pen Needle 32 G x 4 mm; Using 1 per day  Dispense: 100 Each; Refill: 0  - Referral to Diabetic Education    6. Pulmonary sarcoidosis (HCC)  7. Hypercalcemia  Chronic, exacerbated. Stressed importance of pulmonary consult. He will prioritize this and get in.   - Referral to Pulmonary and Sleep Medicine    Health Maintenance: Retinal results requested-- pt reports retinopathy identified bilaterally when he was seen in the last few months.     Followup: Return in about 1 month (around 5/11/2022) for follow-up on Diabetes, sooner if needed.

## (undated) DEVICE — CATHETER IV SAFETY 22 GA X 1 (50EA/BX)

## (undated) DEVICE — KIT  I.V. START (100EA/CA)

## (undated) DEVICE — SET EXTENSION WITH 2 PORTS (48EA/CA) ***PART #2C8610 IS A SUBSTITUTE*****

## (undated) DEVICE — WATER IRRIGATION STERILE 1000ML (12EA/CA)

## (undated) DEVICE — CATHETER IV SAFETY 24 GA X 3/4 (50EA/BX)

## (undated) DEVICE — ELECTRODE DUAL RETURN W/ CORD - (50/PK)

## (undated) DEVICE — LACTATED RINGERS INJ 1000 ML - (14EA/CA 60CA/PF)

## (undated) DEVICE — CANISTER SUCTION 3000ML MECHANICAL FILTER AUTO SHUTOFF MEDI-VAC NONSTERILE LF DISP  (40EA/CA)

## (undated) DEVICE — TUBE E-T HI-LO CUFF 8.0MM (10EA/PK)

## (undated) DEVICE — MASK WITH FACE SHIELD (25/BX 4BX/CA)

## (undated) DEVICE — SET I.V. EXTENSION DIAL-A- - FLOW REGULATOR (48EA/CA)

## (undated) DEVICE — SYRINGE DISP. 60 CC LL - (30/BX, 12BX/CA)**WHEN THESE ARE GONE ORDER #500206**

## (undated) DEVICE — TUBE CONNECTING SUCTION - CLEAR PLASTIC STERILE 72 IN (50EA/CA)

## (undated) DEVICE — MASK ANESTHESIA ADULT  - (100/CA)

## (undated) DEVICE — CANISTER SUCTION RIGID RED 1500CC (40EA/CA)

## (undated) DEVICE — HEAD HOLDER JUNIOR/ADULT

## (undated) DEVICE — SPONGE GAUZE NON-STERILE 4X4 - (2000/CA 10PK/CA)

## (undated) DEVICE — GLOVE BIOGEL SZ 8 SURGICAL PF LTX - (50PR/BX 4BX/CA)

## (undated) DEVICE — TUBE E-T HI-LO CUFF 7.5MM (10EA/PK)

## (undated) DEVICE — SYRINGE SAFETY 5 ML 18 GA X 1-1/2 BLUNT LL (100/BX 4BX/CA)

## (undated) DEVICE — FORCEPS BIOPSY SPYBITE

## (undated) DEVICE — KIT ANESTHESIA W/CIRCUIT & 3/LT BAG W/FILTER (20EA/CA)

## (undated) DEVICE — TUBING CLEARLINK DUO-VENT - C-FLO (48EA/CA)

## (undated) DEVICE — NEEDLE BIOPSY 21G W/LOCKABLE SYRINGE FOR EBUS (5EA/BX)

## (undated) DEVICE — BITE BLOCK ADULT 60FR (100EA/CA)

## (undated) DEVICE — ELECTRODE 850 FOAM ADHESIVE - HYDROGEL RADIOTRNSPRNT (50/PK)

## (undated) DEVICE — CATHETER IV SAFETY 20 GA X 1-1/4 (50/BX)

## (undated) DEVICE — PROTECTOR ULNA NERVE - (36PR/CA)

## (undated) DEVICE — GLOVE, LITE (PAIR)

## (undated) DEVICE — NEPTUNE 4 PORT MANIFOLD - (20/PK)

## (undated) DEVICE — TUBE SUCTION YANKAUER  1/4 X 6FT (20EA/CA)"

## (undated) DEVICE — SUCTION INSTRUMENT YANKAUER BULBOUS TIP W/O VENT (50EA/CA)

## (undated) DEVICE — SYRINGE SAFETY 10 ML 18 GA X 1 1/2 BLUNT LL (100/BX 4BX/CA)

## (undated) DEVICE — TUBE E-T HI-LO CUFF 7.0MM (10EA/PK)

## (undated) DEVICE — GOWN SURGEONS LARGE - (32/CA)

## (undated) DEVICE — TUBE E-T HI-LO CUFF 8.5MM (10EA/PK)

## (undated) DEVICE — SENSOR SPO2 NEO LNCS ADHESIVE (20/BX) SEE USER NOTES

## (undated) DEVICE — SYRINGE SAFETY 3 ML 18 GA X 1 1/2 BLUNT LL (100/BX 8BX/CA)

## (undated) DEVICE — TUBE E-T HI-LO CUFF 6.5MM (10EA/BX)